# Patient Record
Sex: MALE | Race: WHITE | Employment: OTHER | URBAN - METROPOLITAN AREA
[De-identification: names, ages, dates, MRNs, and addresses within clinical notes are randomized per-mention and may not be internally consistent; named-entity substitution may affect disease eponyms.]

---

## 2017-01-19 ENCOUNTER — OFFICE VISIT (OUTPATIENT)
Dept: CARDIOLOGY CLINIC | Age: 77
End: 2017-01-19

## 2017-01-19 VITALS
HEIGHT: 72 IN | HEART RATE: 72 BPM | BODY MASS INDEX: 32.51 KG/M2 | SYSTOLIC BLOOD PRESSURE: 120 MMHG | DIASTOLIC BLOOD PRESSURE: 80 MMHG | WEIGHT: 240 LBS

## 2017-01-19 DIAGNOSIS — Z98.62 S/P ANGIOPLASTY: ICD-10-CM

## 2017-01-19 DIAGNOSIS — I65.21 STENOSIS OF RIGHT CAROTID ARTERY: ICD-10-CM

## 2017-01-19 DIAGNOSIS — R93.89 ABNORMAL CAROTID ULTRASOUND: ICD-10-CM

## 2017-01-19 DIAGNOSIS — I25.810 CORONARY ARTERY DISEASE INVOLVING CORONARY BYPASS GRAFT OF NATIVE HEART WITHOUT ANGINA PECTORIS: Primary | ICD-10-CM

## 2017-01-19 DIAGNOSIS — I77.9 BILATERAL CAROTID ARTERY DISEASE (HCC): ICD-10-CM

## 2017-01-19 DIAGNOSIS — I10 ESSENTIAL HYPERTENSION: ICD-10-CM

## 2017-01-19 DIAGNOSIS — Z95.1 S/P CABG X 3: ICD-10-CM

## 2017-01-19 DIAGNOSIS — E78.2 MIXED HYPERLIPIDEMIA: ICD-10-CM

## 2017-01-19 DIAGNOSIS — I21.4 NON-ST ELEVATION (NSTEMI) MYOCARDIAL INFARCTION (HCC): ICD-10-CM

## 2017-01-19 PROCEDURE — 99214 OFFICE O/P EST MOD 30 MIN: CPT | Performed by: INTERNAL MEDICINE

## 2017-02-03 DIAGNOSIS — Z98.62 S/P ANGIOPLASTY: ICD-10-CM

## 2017-02-03 DIAGNOSIS — I25.810 CORONARY ARTERY DISEASE INVOLVING CORONARY BYPASS GRAFT OF NATIVE HEART WITHOUT ANGINA PECTORIS: ICD-10-CM

## 2017-03-14 ENCOUNTER — HOSPITAL ENCOUNTER (OUTPATIENT)
Dept: GENERAL RADIOLOGY | Age: 77
Discharge: HOME | End: 2017-03-14
Attending: UROLOGY | Admitting: UROLOGY

## 2017-03-14 ENCOUNTER — HOSPITAL ENCOUNTER (OUTPATIENT)
Dept: ULTRASOUND IMAGING | Age: 77
Discharge: HOME | End: 2017-03-14
Attending: GENERAL PRACTICE | Admitting: GENERAL PRACTICE

## 2017-03-14 DIAGNOSIS — R31.9 HEMATURIA SYNDROME: ICD-10-CM

## 2017-03-14 LAB — PROSTATE SPECIFIC ANTIGEN: 0.4 NG/ML (ref 0–4)

## 2017-04-06 ENCOUNTER — HOSPITAL ENCOUNTER (OUTPATIENT)
Dept: ULTRASOUND IMAGING | Age: 77
Discharge: HOME | End: 2017-04-06
Attending: UROLOGY | Admitting: UROLOGY

## 2017-04-06 DIAGNOSIS — N45.2 ORCHITIS: ICD-10-CM

## 2017-04-06 DIAGNOSIS — N47.1 PHIMOSIS: ICD-10-CM

## 2017-04-06 DIAGNOSIS — N45.2 ACUTE ORCHITIS: ICD-10-CM

## 2017-05-15 LAB
CHOLESTEROL, TOTAL: NORMAL MG/DL
CHOLESTEROL/HDL RATIO: NORMAL
HDLC SERPL-MCNC: 55 MG/DL (ref 35–70)
LDL CHOLESTEROL CALCULATED: 57 MG/DL (ref 0–160)
TRIGL SERPL-MCNC: 86 MG/DL
VLDLC SERPL CALC-MCNC: NORMAL MG/DL

## 2017-06-30 ENCOUNTER — OFFICE VISIT (OUTPATIENT)
Dept: CARDIOLOGY CLINIC | Age: 77
End: 2017-06-30

## 2017-06-30 VITALS
SYSTOLIC BLOOD PRESSURE: 138 MMHG | WEIGHT: 251 LBS | DIASTOLIC BLOOD PRESSURE: 68 MMHG | BODY MASS INDEX: 34 KG/M2 | HEART RATE: 72 BPM | HEIGHT: 72 IN

## 2017-06-30 DIAGNOSIS — R06.02 SOB (SHORTNESS OF BREATH): ICD-10-CM

## 2017-06-30 DIAGNOSIS — I10 ESSENTIAL HYPERTENSION: ICD-10-CM

## 2017-06-30 DIAGNOSIS — E78.2 MIXED HYPERLIPIDEMIA: ICD-10-CM

## 2017-06-30 DIAGNOSIS — I25.810 CORONARY ARTERY DISEASE INVOLVING CORONARY BYPASS GRAFT OF NATIVE HEART WITHOUT ANGINA PECTORIS: Primary | ICD-10-CM

## 2017-06-30 DIAGNOSIS — Z95.1 S/P CABG X 3: ICD-10-CM

## 2017-06-30 DIAGNOSIS — I65.21 STENOSIS OF RIGHT CAROTID ARTERY: ICD-10-CM

## 2017-06-30 DIAGNOSIS — I21.4 NON-ST ELEVATION (NSTEMI) MYOCARDIAL INFARCTION (HCC): ICD-10-CM

## 2017-06-30 DIAGNOSIS — Z98.62 S/P ANGIOPLASTY: ICD-10-CM

## 2017-06-30 DIAGNOSIS — I77.9 BILATERAL CAROTID ARTERY DISEASE (HCC): ICD-10-CM

## 2017-06-30 PROCEDURE — 99214 OFFICE O/P EST MOD 30 MIN: CPT | Performed by: INTERNAL MEDICINE

## 2018-01-09 ENCOUNTER — OFFICE VISIT (OUTPATIENT)
Dept: CARDIOLOGY CLINIC | Age: 78
End: 2018-01-09

## 2018-01-09 VITALS
DIASTOLIC BLOOD PRESSURE: 66 MMHG | HEART RATE: 84 BPM | SYSTOLIC BLOOD PRESSURE: 110 MMHG | WEIGHT: 243.4 LBS | BODY MASS INDEX: 32.97 KG/M2 | HEIGHT: 72 IN

## 2018-01-09 DIAGNOSIS — I20.0 UNSTABLE ANGINA (HCC): ICD-10-CM

## 2018-01-09 DIAGNOSIS — E78.2 MIXED HYPERLIPIDEMIA: ICD-10-CM

## 2018-01-09 DIAGNOSIS — I21.4 NON-ST ELEVATION (NSTEMI) MYOCARDIAL INFARCTION (HCC): ICD-10-CM

## 2018-01-09 DIAGNOSIS — Z98.62 S/P ANGIOPLASTY: ICD-10-CM

## 2018-01-09 DIAGNOSIS — I65.21 STENOSIS OF RIGHT CAROTID ARTERY: ICD-10-CM

## 2018-01-09 DIAGNOSIS — I25.810 CORONARY ARTERY DISEASE INVOLVING CORONARY BYPASS GRAFT OF NATIVE HEART WITHOUT ANGINA PECTORIS: Primary | ICD-10-CM

## 2018-01-09 DIAGNOSIS — I10 ESSENTIAL HYPERTENSION: ICD-10-CM

## 2018-01-09 DIAGNOSIS — Z95.1 S/P CABG X 3: ICD-10-CM

## 2018-01-09 DIAGNOSIS — I77.9 BILATERAL CAROTID ARTERY DISEASE (HCC): ICD-10-CM

## 2018-01-09 PROCEDURE — 1036F TOBACCO NON-USER: CPT | Performed by: INTERNAL MEDICINE

## 2018-01-09 PROCEDURE — G8417 CALC BMI ABV UP PARAM F/U: HCPCS | Performed by: INTERNAL MEDICINE

## 2018-01-09 PROCEDURE — 99214 OFFICE O/P EST MOD 30 MIN: CPT | Performed by: INTERNAL MEDICINE

## 2018-01-09 PROCEDURE — G8427 DOCREV CUR MEDS BY ELIG CLIN: HCPCS | Performed by: INTERNAL MEDICINE

## 2018-01-09 PROCEDURE — 1123F ACP DISCUSS/DSCN MKR DOCD: CPT | Performed by: INTERNAL MEDICINE

## 2018-01-09 PROCEDURE — G8598 ASA/ANTIPLAT THER USED: HCPCS | Performed by: INTERNAL MEDICINE

## 2018-01-09 PROCEDURE — 4040F PNEUMOC VAC/ADMIN/RCVD: CPT | Performed by: INTERNAL MEDICINE

## 2018-01-09 PROCEDURE — G8484 FLU IMMUNIZE NO ADMIN: HCPCS | Performed by: INTERNAL MEDICINE

## 2018-01-09 NOTE — LETTER
Cardiology [address] [place] [address] [address] [facility] [address]  Phone: [phone]  Fax: [phone]    Ricky Best MD        January 9, 2018     Edward Davis MD  [address] [place] [address]    Patient: Kota Crespo [facility]  MR Number: [id number]  YOB: 1940  Date of Visit: 1/9/2018    Dear Dr. Eligio Potter: Thank you for the request for consultation for Mary Reed to me for the evaluation of CAD. Below are the relevant portions of my assessment and plan of care. If you have questions, please do not hesitate to call me. I look forward to following Ruth Ann Jamison along with you.     Sincerely,        Ricky Best MD

## 2018-01-09 NOTE — PROGRESS NOTES
Brian Camacho is a 69 y.o. male who has    CHIEF COMPLAINT AS FOLLOWS:  CHEST PAIN: Patient denies any C/O chest pains at this time. SOB: No C/O SOB at this time. LEG EDEMA: No leg edema   PALPITATIONS: Denies any C/O Palpitations                               DIZZINESS: No C/O Dizziness                           SYNCOPE: None   OTHER:                                     HPI: Patient is here for F/U on his CAD, HTN & Dyslipidemia problems. He does not have any complaints at this time.     Bibiana Cadet has the following history recorded in care path:  Patient Active Problem List    Diagnosis Date Noted    Stenosis of right carotid artery 12/01/2016    Bilateral carotid artery disease ([facility] 10/20/2016    Angina at rest [facility])     Abnormal carotid ultrasound     [facility] 09/30/2015    Abnormal nuclear stress test 03/30/2015    Chest pain 03/30/2015    CAD (coronary artery disease)     SOB (shortness of breath)     Hyperlipemia     S/P CABG x 3     HTN (hypertension)     S/P angioplasty     MI (myocardial infarction)     H/O cardiovascular stress test 08/21/2013     Current Outpatient Prescriptions   Medication Sig Dispense Refill    ticagrelor (BRILINTA) 90 MG TABS tablet Take 1 tablet by mouth 2 times daily 90 tablet 3    PRALUENT 75 MG/ML SOPN injection pen Inject 1 mL into the skin every 14 days 6 Pen 2    nitroGLYCERIN (NITROSTAT) 0.4 MG SL tablet Place 1 tablet under the tongue every 5 minutes as needed for Chest pain 25 tablet 2    atorvastatin (LIPITOR) 40 MG tablet Take 1 tablet by mouth daily (Patient taking differently: Take 40 mg by mouth every morning ) 90 tablet 0    naproxen (NAPROSYN) 500 MG tablet Take 500 mg by mouth 2 times daily (with meals)      gabapentin (NEURONTIN) 100 MG capsule Take 100 mg by mouth 3 times daily      traMADol (ULTRAM) 50 MG tablet Take 50 mg of breath  Cardiovascular: Negative for chest pain, dyspnea on exertion, claudication, edema, irregular heartbeat, murmur, palpitations or shortness of breath  Gastrointestinal: Negative for abdominal pain or heartburn  Genito-Urinary: Negative for urinary frequency/urgency  Musculoskeletal: Negative for muscle pain, muscular weakness, negative for pain in arm and leg or swelling in foot and leg  Neurological: Negative for dizziness, headaches, memory loss, numbness/tingling, visual changes, syncope  Dermatological: Negative for rash    Objective: There were no vitals taken for this visit. Wt Readings from Last 3 Encounters:   06/30/17 251 lb (113.9 kg)   01/31/17 240 lb (108.9 kg)   01/19/17 240 lb (108.9 kg)     There is no height or weight on file to calculate BMI. GENERAL - Alert, oriented, pleasant, in no apparent distress. EYES: No jaundice, no conjunctival pallor. SKIN: It is warm & dry. No rashes. No Echhymosis    HEENT  No clinically significant abnormalities seen. Neck - Supple. No jugular venous distention noted. No carotid bruits. Cardiovascular  Normal S1 and S2 without obvious murmur or gallop. Extremities - No cyanosis, clubbing, or significant edema. Pulmonary  No respiratory distress. No wheezes or rales. Abdomen  No masses, tenderness, or organomegaly. Musculoskeletal  No significant edema. No joint deformities. No muscle wasting. Neurologic  Cranial nerves II through XII are grossly intact. There were no gross focal neurologic abnormalities.     Lab Review   No results found for: CKTOTAL, CKMB, CKMBINDEX, TROPONINI  BNP:  No results found for: BNP  PT/INR:    Lab Results   Component Value Date    INR 0.96 12/12/2016     No results found for: LABA1C  Lab Results   Component Value Date    WBC 12.4 (H) 12/15/2016    HCT 44.3 12/15/2016    MCV 89.1 12/15/2016     12/15/2016     Lab Results   Component Value Date    CHOL 120 02/12/2016    TRIG 86 05/15/2017    HDL 55 05/15/2017    LDLCALC 57 05/15/2017    LDLDIRECT 67 02/12/2016     No results found for: ALT, AST  BMP:    Lab Results   Component Value Date     12/15/2016    K 4.4 12/15/2016     12/15/2016    CO2 26 12/15/2016    BUN 15 12/15/2016    CREATININE 0.9 12/15/2016     CMP:   Lab Results   Component Value Date     12/15/2016    K 4.4 12/15/2016     12/15/2016    CO2 26 12/15/2016    BUN 15 12/15/2016     TSH:  No results found for: TSH    QUALITY MEASURES REVIEWED:  1.CAD:Patient is taking anti platelet agent:Yes  2. DYSLIPIDEMIA: Patient is on cholesterol lowering medication:Yes  3. Beta-Blocker therapy for CAD, if prior Myocardial Infarction:Yes  4. Atrial fibrillation & anticoagulation therapy No  5. Discussed weight management strategies. Impression:    1. Coronary artery disease involving coronary bypass graft of native heart without angina pectoris    2. Unstable angina (HCC)    3. Stenosis of right carotid artery    4. S/P CABG x 3    5. S/P angioplasty    6. Non-ST elevation (NSTEMI) myocardial infarction ([facility]    7. Mixed hyperlipidemia    8. Essential hypertension    9. Bilateral carotid artery disease ([facility]       Patient Active Problem List   Diagnosis Code    H/O cardiovascular stress test Z92.89    CAD (coronary artery disease) I25.10    SOB (shortness of breath) R06.02    Hyperlipemia E78.5    S/P CABG x 3 Z95.1    HTN (hypertension) I10    S/P angioplasty Z98.62    MI (myocardial infarction) I21.9    Abnormal nuclear stress test R94.39    Chest pain R07.9    Unstable angina (HCC) I20.0    Angina at rest ([address] I20.8    Abnormal carotid ultrasound R93.8    Bilateral carotid artery disease (HCC) I77.9    Stenosis of right carotid artery I65.21       Assessment & Plan:    CAD:Yes   clinically stable. Patient is on optimal medical regimen ( see medication list above )  -     CORONARY ARTERY DISEASE: Patient is currently  asymptomatic from CAD.             - changes in

## 2018-03-01 RX ORDER — TICAGRELOR 90 MG/1
TABLET ORAL
Qty: 90 TABLET | Refills: 3 | Status: ON HOLD | OUTPATIENT
Start: 2018-03-01 | End: 2021-04-10 | Stop reason: HOSPADM

## 2018-04-05 ENCOUNTER — TELEPHONE (OUTPATIENT)
Dept: CARDIOLOGY CLINIC | Age: 78
End: 2018-04-05

## 2018-04-10 ENCOUNTER — TELEPHONE (OUTPATIENT)
Dept: CARDIOLOGY CLINIC | Age: 78
End: 2018-04-10

## 2018-04-30 ENCOUNTER — TELEPHONE (OUTPATIENT)
Dept: CARDIOLOGY CLINIC | Age: 78
End: 2018-04-30

## 2018-05-23 DIAGNOSIS — Z01.818 PRE-OP EXAMINATION: Primary | ICD-10-CM

## 2018-05-23 DIAGNOSIS — I10 ESSENTIAL HYPERTENSION: ICD-10-CM

## 2018-05-23 DIAGNOSIS — I25.10 CORONARY ARTERY DISEASE INVOLVING NATIVE CORONARY ARTERY OF NATIVE HEART WITHOUT ANGINA PECTORIS: ICD-10-CM

## 2018-05-23 DIAGNOSIS — Z95.1 S/P CABG X 3: ICD-10-CM

## 2018-05-29 ENCOUNTER — PROCEDURE VISIT (OUTPATIENT)
Dept: CARDIOLOGY CLINIC | Age: 78
End: 2018-05-29

## 2018-05-29 DIAGNOSIS — Z95.1 S/P CABG X 3: ICD-10-CM

## 2018-05-29 DIAGNOSIS — Z01.818 PRE-OP EXAMINATION: ICD-10-CM

## 2018-05-29 DIAGNOSIS — I10 ESSENTIAL HYPERTENSION: ICD-10-CM

## 2018-05-29 DIAGNOSIS — I25.10 CORONARY ARTERY DISEASE INVOLVING NATIVE CORONARY ARTERY OF NATIVE HEART WITHOUT ANGINA PECTORIS: ICD-10-CM

## 2018-05-29 LAB
LV EF: 42 %
LVEF MODALITY: NORMAL

## 2018-05-29 PROCEDURE — 93015 CV STRESS TEST SUPVJ I&R: CPT | Performed by: INTERNAL MEDICINE

## 2018-05-29 PROCEDURE — 78452 HT MUSCLE IMAGE SPECT MULT: CPT | Performed by: INTERNAL MEDICINE

## 2018-05-29 PROCEDURE — A9500 TC99M SESTAMIBI: HCPCS | Performed by: INTERNAL MEDICINE

## 2018-05-30 ENCOUNTER — TELEPHONE (OUTPATIENT)
Dept: CARDIOLOGY CLINIC | Age: 78
End: 2018-05-30

## 2018-06-01 ENCOUNTER — TELEPHONE (OUTPATIENT)
Dept: CARDIOLOGY CLINIC | Age: 78
End: 2018-06-01

## 2018-06-01 ENCOUNTER — HOSPITAL ENCOUNTER (OUTPATIENT)
Dept: GENERAL RADIOLOGY | Age: 78
Discharge: HOME | End: 2018-06-01
Attending: INTERNAL MEDICINE | Admitting: INTERNAL MEDICINE

## 2018-06-01 DIAGNOSIS — Z01.818 PRE-PROCEDURAL EXAMINATION: ICD-10-CM

## 2018-06-01 LAB
ANION GAP SERPL CALCULATED.3IONS-SCNC: 13 MMOL/L (ref 4–16)
APTT: 29.5 SECONDS (ref 21.2–33)
BASOPHILS ABSOLUTE: 0 K/CU MM
BASOPHILS RELATIVE PERCENT: 0.6 % (ref 0–1)
BUN BLDV-MCNC: 27 MG/DL (ref 6–23)
CALCIUM SERPL-MCNC: 9.5 MG/DL (ref 8.3–10.6)
CHLORIDE BLD-SCNC: 104 MMOL/L (ref 99–110)
CO2: 23 MMOL/L (ref 21–32)
CREAT SERPL-MCNC: 1.1 MG/DL (ref 0.9–1.3)
DIFFERENTIAL TYPE: ABNORMAL
EOSINOPHILS ABSOLUTE: 0.1 K/CU MM
EOSINOPHILS RELATIVE PERCENT: 1.8 % (ref 0–3)
GFR AFRICAN AMERICAN: >60 ML/MIN/1.73M2
GFR NON-AFRICAN AMERICAN: >60 ML/MIN/1.73M2
GLUCOSE BLD-MCNC: 114 MG/DL (ref 70–99)
HCT VFR BLD CALC: 43 % (ref 42–52)
HEMOGLOBIN: 14.5 GM/DL (ref 13.5–18)
IMMATURE NEUTROPHIL %: 0.3 % (ref 0–0.43)
INR BLD: 0.98 INDEX
LYMPHOCYTES ABSOLUTE: 1.1 K/CU MM
LYMPHOCYTES RELATIVE PERCENT: 17 % (ref 24–44)
MCH RBC QN AUTO: 31.5 PG (ref 27–31)
MCHC RBC AUTO-ENTMCNC: 33.7 % (ref 32–36)
MCV RBC AUTO: 93.3 FL (ref 78–100)
MONOCYTES ABSOLUTE: 0.9 K/CU MM
MONOCYTES RELATIVE PERCENT: 13.9 % (ref 0–4)
NUCLEATED RBC %: 0 %
PDW BLD-RTO: 12.3 % (ref 11.7–14.9)
PLATELET # BLD: 151 K/CU MM (ref 140–440)
PMV BLD AUTO: 10.5 FL (ref 7.5–11.1)
POTASSIUM SERPL-SCNC: 4 MMOL/L (ref 3.5–5.1)
PROTHROMBIN TIME: 11.2 SECONDS (ref 9.12–12.5)
RBC # BLD: 4.61 M/CU MM (ref 4.6–6.2)
SEGMENTED NEUTROPHILS ABSOLUTE COUNT: 4.2 K/CU MM
SEGMENTED NEUTROPHILS RELATIVE PERCENT: 66.4 % (ref 36–66)
SODIUM BLD-SCNC: 140 MMOL/L (ref 135–145)
TOTAL IMMATURE NEUTOROPHIL: 0.02 K/CU MM
TOTAL NUCLEATED RBC: 0 K/CU MM
WBC # BLD: 6.3 K/CU MM (ref 4–10.5)

## 2018-06-02 ENCOUNTER — SURG/PROC ORDERS (OUTPATIENT)
Dept: CARDIOLOGY CLINIC | Age: 78
End: 2018-06-02

## 2018-06-02 RX ORDER — SODIUM CHLORIDE 0.9 % (FLUSH) 0.9 %
10 SYRINGE (ML) INJECTION EVERY 12 HOURS SCHEDULED
Status: CANCELLED | OUTPATIENT
Start: 2018-06-02

## 2018-06-02 RX ORDER — SODIUM CHLORIDE 0.9 % (FLUSH) 0.9 %
10 SYRINGE (ML) INJECTION PRN
Status: CANCELLED | OUTPATIENT
Start: 2018-06-02

## 2018-06-02 RX ORDER — DIAZEPAM 5 MG/1
5 TABLET ORAL
Status: CANCELLED | OUTPATIENT
Start: 2018-06-02 | End: 2018-06-02

## 2018-06-02 RX ORDER — SODIUM CHLORIDE 9 MG/ML
INJECTION, SOLUTION INTRAVENOUS CONTINUOUS
Status: CANCELLED | OUTPATIENT
Start: 2018-06-02

## 2018-06-02 RX ORDER — DIPHENHYDRAMINE HCL 25 MG
25 TABLET ORAL
Status: CANCELLED | OUTPATIENT
Start: 2018-06-02 | End: 2018-06-02

## 2018-06-04 LAB
EKG ATRIAL RATE: 63 BPM
EKG DIAGNOSIS: NORMAL
EKG P AXIS: 44 DEGREES
EKG P-R INTERVAL: 252 MS
EKG Q-T INTERVAL: 412 MS
EKG QRS DURATION: 100 MS
EKG QTC CALCULATION (BAZETT): 421 MS
EKG R AXIS: 1 DEGREES
EKG T AXIS: -22 DEGREES
EKG VENTRICULAR RATE: 63 BPM

## 2018-06-07 ENCOUNTER — TELEPHONE (OUTPATIENT)
Dept: CARDIOLOGY CLINIC | Age: 78
End: 2018-06-07

## 2019-02-22 ENCOUNTER — TELEPHONE (OUTPATIENT)
Dept: CARDIOLOGY CLINIC | Age: 79
End: 2019-02-22

## 2019-02-22 ENCOUNTER — OFFICE VISIT (OUTPATIENT)
Dept: CARDIOLOGY CLINIC | Age: 79
End: 2019-02-22
Payer: MEDICARE

## 2019-02-22 VITALS
WEIGHT: 252 LBS | HEIGHT: 72 IN | BODY MASS INDEX: 34.13 KG/M2 | HEART RATE: 63 BPM | DIASTOLIC BLOOD PRESSURE: 64 MMHG | SYSTOLIC BLOOD PRESSURE: 110 MMHG

## 2019-02-22 DIAGNOSIS — I10 ESSENTIAL HYPERTENSION: ICD-10-CM

## 2019-02-22 DIAGNOSIS — Z01.818 PRE-OP EVALUATION: ICD-10-CM

## 2019-02-22 DIAGNOSIS — I77.9 BILATERAL CAROTID ARTERY DISEASE, UNSPECIFIED TYPE (HCC): ICD-10-CM

## 2019-02-22 DIAGNOSIS — E78.2 MIXED HYPERLIPIDEMIA: ICD-10-CM

## 2019-02-22 DIAGNOSIS — I25.10 CORONARY ARTERY DISEASE INVOLVING NATIVE CORONARY ARTERY OF NATIVE HEART WITHOUT ANGINA PECTORIS: Primary | ICD-10-CM

## 2019-02-22 PROCEDURE — 99213 OFFICE O/P EST LOW 20 MIN: CPT | Performed by: NURSE PRACTITIONER

## 2019-02-22 PROCEDURE — 93000 ELECTROCARDIOGRAM COMPLETE: CPT | Performed by: NURSE PRACTITIONER

## 2019-10-01 ENCOUNTER — TELEPHONE (OUTPATIENT)
Dept: CARDIOLOGY CLINIC | Age: 79
End: 2019-10-01

## 2019-10-02 ENCOUNTER — TELEPHONE (OUTPATIENT)
Dept: CARDIOLOGY CLINIC | Age: 79
End: 2019-10-02

## 2019-10-16 ENCOUNTER — OFFICE VISIT (OUTPATIENT)
Dept: PHYSICAL MEDICINE AND REHAB | Age: 79
End: 2019-10-16
Payer: MEDICARE

## 2019-10-16 DIAGNOSIS — E11.42 DIABETIC SENSORIMOTOR POLYNEUROPATHY (HCC): ICD-10-CM

## 2019-10-16 DIAGNOSIS — R20.2 PARESTHESIA OF BOTH FEET: ICD-10-CM

## 2019-10-16 DIAGNOSIS — M54.32 SCIATICA OF LEFT SIDE: Primary | ICD-10-CM

## 2019-10-16 DIAGNOSIS — R29.898 LEFT LEG WEAKNESS: ICD-10-CM

## 2019-10-16 PROCEDURE — 95911 NRV CNDJ TEST 9-10 STUDIES: CPT | Performed by: PHYSICAL MEDICINE & REHABILITATION

## 2019-10-16 PROCEDURE — 95886 MUSC TEST DONE W/N TEST COMP: CPT | Performed by: PHYSICAL MEDICINE & REHABILITATION

## 2019-10-17 ENCOUNTER — OFFICE VISIT (OUTPATIENT)
Dept: CARDIOLOGY CLINIC | Age: 79
End: 2019-10-17
Payer: MEDICARE

## 2019-10-17 ENCOUNTER — TELEPHONE (OUTPATIENT)
Dept: CARDIOLOGY CLINIC | Age: 79
End: 2019-10-17

## 2019-10-17 VITALS
HEART RATE: 68 BPM | BODY MASS INDEX: 32.78 KG/M2 | DIASTOLIC BLOOD PRESSURE: 78 MMHG | WEIGHT: 242 LBS | HEIGHT: 72 IN | RESPIRATION RATE: 14 BRPM | SYSTOLIC BLOOD PRESSURE: 124 MMHG

## 2019-10-17 DIAGNOSIS — R07.9 CHEST PAIN, UNSPECIFIED TYPE: Primary | ICD-10-CM

## 2019-10-17 PROCEDURE — G8484 FLU IMMUNIZE NO ADMIN: HCPCS | Performed by: INTERNAL MEDICINE

## 2019-10-17 PROCEDURE — G8598 ASA/ANTIPLAT THER USED: HCPCS | Performed by: INTERNAL MEDICINE

## 2019-10-17 PROCEDURE — G8417 CALC BMI ABV UP PARAM F/U: HCPCS | Performed by: INTERNAL MEDICINE

## 2019-10-17 PROCEDURE — 1036F TOBACCO NON-USER: CPT | Performed by: INTERNAL MEDICINE

## 2019-10-17 PROCEDURE — 1123F ACP DISCUSS/DSCN MKR DOCD: CPT | Performed by: INTERNAL MEDICINE

## 2019-10-17 PROCEDURE — 4040F PNEUMOC VAC/ADMIN/RCVD: CPT | Performed by: INTERNAL MEDICINE

## 2019-10-17 PROCEDURE — 99214 OFFICE O/P EST MOD 30 MIN: CPT | Performed by: INTERNAL MEDICINE

## 2019-10-17 PROCEDURE — 93000 ELECTROCARDIOGRAM COMPLETE: CPT | Performed by: INTERNAL MEDICINE

## 2019-10-17 PROCEDURE — G8427 DOCREV CUR MEDS BY ELIG CLIN: HCPCS | Performed by: INTERNAL MEDICINE

## 2019-10-18 ENCOUNTER — TELEPHONE (OUTPATIENT)
Dept: CARDIOLOGY CLINIC | Age: 79
End: 2019-10-18

## 2019-10-23 ENCOUNTER — PROCEDURE VISIT (OUTPATIENT)
Dept: CARDIOLOGY CLINIC | Age: 79
End: 2019-10-23
Payer: MEDICARE

## 2019-10-23 DIAGNOSIS — R07.9 CHEST PAIN, UNSPECIFIED TYPE: Primary | ICD-10-CM

## 2019-10-23 DIAGNOSIS — R07.9 CHEST PAIN, UNSPECIFIED TYPE: ICD-10-CM

## 2019-10-23 LAB
LV EF: 48 %
LV EF: 51 %
LVEF MODALITY: NORMAL
LVEF MODALITY: NORMAL

## 2019-10-23 PROCEDURE — 93018 CV STRESS TEST I&R ONLY: CPT | Performed by: INTERNAL MEDICINE

## 2019-10-23 PROCEDURE — 93017 CV STRESS TEST TRACING ONLY: CPT | Performed by: INTERNAL MEDICINE

## 2019-10-23 PROCEDURE — 93016 CV STRESS TEST SUPVJ ONLY: CPT | Performed by: INTERNAL MEDICINE

## 2019-10-23 PROCEDURE — 78452 HT MUSCLE IMAGE SPECT MULT: CPT | Performed by: INTERNAL MEDICINE

## 2019-10-23 PROCEDURE — 93306 TTE W/DOPPLER COMPLETE: CPT | Performed by: INTERNAL MEDICINE

## 2019-10-23 PROCEDURE — A9500 TC99M SESTAMIBI: HCPCS | Performed by: INTERNAL MEDICINE

## 2019-10-28 ENCOUNTER — TELEPHONE (OUTPATIENT)
Dept: CARDIOLOGY CLINIC | Age: 79
End: 2019-10-28

## 2019-11-14 ENCOUNTER — OFFICE VISIT (OUTPATIENT)
Dept: CARDIOLOGY CLINIC | Age: 79
End: 2019-11-14
Payer: MEDICARE

## 2019-11-14 VITALS
HEART RATE: 74 BPM | SYSTOLIC BLOOD PRESSURE: 132 MMHG | DIASTOLIC BLOOD PRESSURE: 74 MMHG | BODY MASS INDEX: 33 KG/M2 | WEIGHT: 243.6 LBS | HEIGHT: 72 IN

## 2019-11-14 DIAGNOSIS — Z95.1 S/P CABG X 3: ICD-10-CM

## 2019-11-14 DIAGNOSIS — I65.21 STENOSIS OF RIGHT CAROTID ARTERY: ICD-10-CM

## 2019-11-14 DIAGNOSIS — Z98.62 S/P ANGIOPLASTY: ICD-10-CM

## 2019-11-14 DIAGNOSIS — I10 ESSENTIAL HYPERTENSION: ICD-10-CM

## 2019-11-14 DIAGNOSIS — E78.2 MIXED HYPERLIPIDEMIA: ICD-10-CM

## 2019-11-14 DIAGNOSIS — I21.4 NON-ST ELEVATION MYOCARDIAL INFARCTION (NSTEMI) (HCC): ICD-10-CM

## 2019-11-14 DIAGNOSIS — I25.10 CORONARY ARTERY DISEASE INVOLVING NATIVE CORONARY ARTERY OF NATIVE HEART WITHOUT ANGINA PECTORIS: Primary | ICD-10-CM

## 2019-11-14 DIAGNOSIS — I77.9 BILATERAL CAROTID ARTERY DISEASE, UNSPECIFIED TYPE (HCC): ICD-10-CM

## 2019-11-14 DIAGNOSIS — R94.39 ABNORMAL NUCLEAR STRESS TEST: ICD-10-CM

## 2019-11-14 PROCEDURE — G8598 ASA/ANTIPLAT THER USED: HCPCS | Performed by: INTERNAL MEDICINE

## 2019-11-14 PROCEDURE — 99214 OFFICE O/P EST MOD 30 MIN: CPT | Performed by: INTERNAL MEDICINE

## 2019-11-14 PROCEDURE — 1036F TOBACCO NON-USER: CPT | Performed by: INTERNAL MEDICINE

## 2019-11-14 PROCEDURE — G8417 CALC BMI ABV UP PARAM F/U: HCPCS | Performed by: INTERNAL MEDICINE

## 2019-11-14 PROCEDURE — G8484 FLU IMMUNIZE NO ADMIN: HCPCS | Performed by: INTERNAL MEDICINE

## 2019-11-14 PROCEDURE — G8427 DOCREV CUR MEDS BY ELIG CLIN: HCPCS | Performed by: INTERNAL MEDICINE

## 2019-11-14 PROCEDURE — 1123F ACP DISCUSS/DSCN MKR DOCD: CPT | Performed by: INTERNAL MEDICINE

## 2019-11-14 PROCEDURE — 4040F PNEUMOC VAC/ADMIN/RCVD: CPT | Performed by: INTERNAL MEDICINE

## 2020-01-21 RX ORDER — SODIUM CHLORIDE, SODIUM LACTATE, POTASSIUM CHLORIDE, CALCIUM CHLORIDE 600; 310; 30; 20 MG/100ML; MG/100ML; MG/100ML; MG/100ML
INJECTION, SOLUTION INTRAVENOUS CONTINUOUS
Status: CANCELLED | OUTPATIENT
Start: 2020-01-31

## 2020-01-22 ENCOUNTER — ANESTHESIA EVENT (OUTPATIENT)
Dept: OPERATING ROOM | Age: 80
DRG: 483 | End: 2020-01-22
Payer: MEDICARE

## 2020-01-22 ASSESSMENT — ENCOUNTER SYMPTOMS: SHORTNESS OF BREATH: 1

## 2020-01-23 NOTE — PROGRESS NOTES
1/23/20 - Reminded of pre-testing on 1/24/20 @ 1000, arrival 0900. Bring insurance card, picture ID and list of medications. Kaitlinjose Mckeono his wife verbalized understanding.

## 2020-01-24 ENCOUNTER — HOSPITAL ENCOUNTER (OUTPATIENT)
Dept: GENERAL RADIOLOGY | Age: 80
Discharge: HOME | End: 2020-01-24
Payer: MEDICARE

## 2020-01-24 ENCOUNTER — HOSPITAL ENCOUNTER (OUTPATIENT)
Dept: PREADMISSION TESTING | Age: 80
Discharge: HOME | End: 2020-01-28
Payer: MEDICARE

## 2020-01-24 VITALS
RESPIRATION RATE: 18 BRPM | BODY MASS INDEX: 32.64 KG/M2 | TEMPERATURE: 97.4 F | WEIGHT: 241 LBS | SYSTOLIC BLOOD PRESSURE: 134 MMHG | DIASTOLIC BLOOD PRESSURE: 66 MMHG | OXYGEN SATURATION: 97 % | HEIGHT: 72 IN | HEART RATE: 73 BPM

## 2020-01-24 LAB
ABO/RH: NORMAL
ALBUMIN SERPL-MCNC: 4.1 GM/DL (ref 3.4–5)
ALP BLD-CCNC: 122 IU/L (ref 40–128)
ALT SERPL-CCNC: 11 U/L (ref 10–40)
ANION GAP SERPL CALCULATED.3IONS-SCNC: 10 MMOL/L (ref 4–16)
ANTIBODY SCREEN: NEGATIVE
AST SERPL-CCNC: 20 IU/L (ref 15–37)
BASOPHILS ABSOLUTE: 0 K/CU MM
BASOPHILS RELATIVE PERCENT: 0.4 % (ref 0–1)
BILIRUB SERPL-MCNC: 0.5 MG/DL (ref 0–1)
BUN BLDV-MCNC: 30 MG/DL (ref 6–23)
CALCIUM SERPL-MCNC: 9.2 MG/DL (ref 8.3–10.6)
CHLORIDE BLD-SCNC: 103 MMOL/L (ref 99–110)
CO2: 27 MMOL/L (ref 21–32)
COMMENT: NORMAL
CREAT SERPL-MCNC: 1.2 MG/DL (ref 0.9–1.3)
DIFFERENTIAL TYPE: ABNORMAL
EKG ATRIAL RATE: 62 BPM
EKG DIAGNOSIS: NORMAL
EKG P AXIS: 47 DEGREES
EKG P-R INTERVAL: 278 MS
EKG Q-T INTERVAL: 416 MS
EKG QRS DURATION: 96 MS
EKG QTC CALCULATION (BAZETT): 422 MS
EKG R AXIS: -16 DEGREES
EKG T AXIS: -31 DEGREES
EKG VENTRICULAR RATE: 62 BPM
EOSINOPHILS ABSOLUTE: 0.1 K/CU MM
EOSINOPHILS RELATIVE PERCENT: 1.4 % (ref 0–3)
ERYTHROCYTE SEDIMENTATION RATE: 6 MM/HR (ref 0–20)
GFR AFRICAN AMERICAN: >60 ML/MIN/1.73M2
GFR NON-AFRICAN AMERICAN: 58 ML/MIN/1.73M2
GLUCOSE BLD-MCNC: 176 MG/DL (ref 70–99)
HCT VFR BLD CALC: 49.1 % (ref 42–52)
HEMOGLOBIN: 15.9 GM/DL (ref 13.5–18)
IMMATURE NEUTROPHIL %: 0.6 % (ref 0–0.43)
LYMPHOCYTES ABSOLUTE: 1.3 K/CU MM
LYMPHOCYTES RELATIVE PERCENT: 16.2 % (ref 24–44)
MCH RBC QN AUTO: 29.1 PG (ref 27–31)
MCHC RBC AUTO-ENTMCNC: 32.4 % (ref 32–36)
MCV RBC AUTO: 89.8 FL (ref 78–100)
MONOCYTES ABSOLUTE: 0.7 K/CU MM
MONOCYTES RELATIVE PERCENT: 8.5 % (ref 0–4)
NUCLEATED RBC %: 0 %
PDW BLD-RTO: 12.4 % (ref 11.7–14.9)
PLATELET # BLD: 202 K/CU MM (ref 140–440)
PMV BLD AUTO: 10.1 FL (ref 7.5–11.1)
POTASSIUM SERPL-SCNC: 4.2 MMOL/L (ref 3.5–5.1)
RBC # BLD: 5.47 M/CU MM (ref 4.6–6.2)
SEGMENTED NEUTROPHILS ABSOLUTE COUNT: 5.6 K/CU MM
SEGMENTED NEUTROPHILS RELATIVE PERCENT: 72.9 % (ref 36–66)
SODIUM BLD-SCNC: 140 MMOL/L (ref 135–145)
TOTAL IMMATURE NEUTOROPHIL: 0.05 K/CU MM
TOTAL NUCLEATED RBC: 0 K/CU MM
TOTAL PROTEIN: 6.4 GM/DL (ref 6.4–8.2)
WBC # BLD: 7.7 K/CU MM (ref 4–10.5)

## 2020-01-24 PROCEDURE — 71046 X-RAY EXAM CHEST 2 VIEWS: CPT

## 2020-01-24 PROCEDURE — 86850 RBC ANTIBODY SCREEN: CPT

## 2020-01-24 PROCEDURE — 36415 COLL VENOUS BLD VENIPUNCTURE: CPT

## 2020-01-24 PROCEDURE — 87086 URINE CULTURE/COLONY COUNT: CPT

## 2020-01-24 PROCEDURE — 93010 ELECTROCARDIOGRAM REPORT: CPT | Performed by: INTERNAL MEDICINE

## 2020-01-24 PROCEDURE — 93005 ELECTROCARDIOGRAM TRACING: CPT | Performed by: ORTHOPAEDIC SURGERY

## 2020-01-24 PROCEDURE — 85025 COMPLETE CBC W/AUTO DIFF WBC: CPT

## 2020-01-24 PROCEDURE — 73030 X-RAY EXAM OF SHOULDER: CPT

## 2020-01-24 PROCEDURE — 85652 RBC SED RATE AUTOMATED: CPT

## 2020-01-24 PROCEDURE — 80053 COMPREHEN METABOLIC PANEL: CPT

## 2020-01-24 PROCEDURE — 86901 BLOOD TYPING SEROLOGIC RH(D): CPT

## 2020-01-24 PROCEDURE — 86900 BLOOD TYPING SEROLOGIC ABO: CPT

## 2020-01-24 RX ORDER — CEFAZOLIN SODIUM 2 G/100ML
2 INJECTION, SOLUTION INTRAVENOUS ONCE
Status: CANCELLED | OUTPATIENT
Start: 2020-01-31

## 2020-01-25 LAB
CULTURE: NORMAL
Lab: NORMAL
SPECIMEN: NORMAL

## 2020-01-31 ENCOUNTER — APPOINTMENT (OUTPATIENT)
Dept: GENERAL RADIOLOGY | Age: 80
DRG: 483 | End: 2020-01-31
Attending: ORTHOPAEDIC SURGERY
Payer: MEDICARE

## 2020-01-31 ENCOUNTER — HOSPITAL ENCOUNTER (INPATIENT)
Age: 80
LOS: 1 days | Discharge: HOME | DRG: 483 | End: 2020-02-01
Attending: ORTHOPAEDIC SURGERY | Admitting: ORTHOPAEDIC SURGERY
Payer: MEDICARE

## 2020-01-31 ENCOUNTER — ANESTHESIA (OUTPATIENT)
Dept: OPERATING ROOM | Age: 80
DRG: 483 | End: 2020-01-31
Payer: MEDICARE

## 2020-01-31 VITALS
TEMPERATURE: 96.4 F | RESPIRATION RATE: 7 BRPM | OXYGEN SATURATION: 100 % | SYSTOLIC BLOOD PRESSURE: 137 MMHG | DIASTOLIC BLOOD PRESSURE: 59 MMHG

## 2020-01-31 PROBLEM — Z96.612 HISTORY OF LEFT SHOULDER REPLACEMENT: Status: ACTIVE | Noted: 2020-01-31

## 2020-01-31 LAB
GLUCOSE BLD-MCNC: 136 MG/DL (ref 70–99)
GLUCOSE BLD-MCNC: 142 MG/DL (ref 70–99)

## 2020-01-31 PROCEDURE — 73030 X-RAY EXAM OF SHOULDER: CPT

## 2020-01-31 PROCEDURE — 6360000002 HC RX W HCPCS: Performed by: NURSE ANESTHETIST, CERTIFIED REGISTERED

## 2020-01-31 PROCEDURE — C1776 JOINT DEVICE (IMPLANTABLE): HCPCS | Performed by: ORTHOPAEDIC SURGERY

## 2020-01-31 PROCEDURE — L3650 SO 8 ABD RESTRAINT PRE OTS: HCPCS | Performed by: ORTHOPAEDIC SURGERY

## 2020-01-31 PROCEDURE — 3700000000 HC ANESTHESIA ATTENDED CARE: Performed by: ORTHOPAEDIC SURGERY

## 2020-01-31 PROCEDURE — 2500000003 HC RX 250 WO HCPCS: Performed by: NURSE ANESTHETIST, CERTIFIED REGISTERED

## 2020-01-31 PROCEDURE — 2500000003 HC RX 250 WO HCPCS: Performed by: ORTHOPAEDIC SURGERY

## 2020-01-31 PROCEDURE — 2580000003 HC RX 258: Performed by: ORTHOPAEDIC SURGERY

## 2020-01-31 PROCEDURE — 7100000000 HC PACU RECOVERY - FIRST 15 MIN: Performed by: ORTHOPAEDIC SURGERY

## 2020-01-31 PROCEDURE — 2709999900 HC NON-CHARGEABLE SUPPLY: Performed by: ORTHOPAEDIC SURGERY

## 2020-01-31 PROCEDURE — 2580000003 HC RX 258

## 2020-01-31 PROCEDURE — 6360000002 HC RX W HCPCS: Performed by: ANESTHESIOLOGY

## 2020-01-31 PROCEDURE — 6360000002 HC RX W HCPCS: Performed by: ORTHOPAEDIC SURGERY

## 2020-01-31 PROCEDURE — 3600000015 HC SURGERY LEVEL 5 ADDTL 15MIN: Performed by: ORTHOPAEDIC SURGERY

## 2020-01-31 PROCEDURE — 3600000005 HC SURGERY LEVEL 5 BASE: Performed by: ORTHOPAEDIC SURGERY

## 2020-01-31 PROCEDURE — 6370000000 HC RX 637 (ALT 250 FOR IP): Performed by: ORTHOPAEDIC SURGERY

## 2020-01-31 PROCEDURE — 64415 NJX AA&/STRD BRCH PLXS IMG: CPT | Performed by: ANESTHESIOLOGY

## 2020-01-31 PROCEDURE — 0RRK00Z REPLACEMENT OF LEFT SHOULDER JOINT WITH REVERSE BALL AND SOCKET SYNTHETIC SUBSTITUTE, OPEN APPROACH: ICD-10-PCS | Performed by: ORTHOPAEDIC SURGERY

## 2020-01-31 PROCEDURE — C1713 ANCHOR/SCREW BN/BN,TIS/BN: HCPCS | Performed by: ORTHOPAEDIC SURGERY

## 2020-01-31 PROCEDURE — 1200000000 HC SEMI PRIVATE

## 2020-01-31 PROCEDURE — 82962 GLUCOSE BLOOD TEST: CPT

## 2020-01-31 PROCEDURE — 7100000001 HC PACU RECOVERY - ADDTL 15 MIN: Performed by: ORTHOPAEDIC SURGERY

## 2020-01-31 PROCEDURE — 3700000001 HC ADD 15 MINUTES (ANESTHESIA): Performed by: ORTHOPAEDIC SURGERY

## 2020-01-31 DEVICE — IMPLANTABLE DEVICE: Type: IMPLANTABLE DEVICE | Site: SHOULDER | Status: FUNCTIONAL

## 2020-01-31 DEVICE — CUP HUM DIA38MM +3MM OFFSET STD SHLDR POLYETH DELT XTEND: Type: IMPLANTABLE DEVICE | Site: SHOULDER | Status: FUNCTIONAL

## 2020-01-31 DEVICE — SPHERE GLEN DIA38MM SHLDR ECC FOR DELT XTEND REV SYS: Type: IMPLANTABLE DEVICE | Site: SHOULDER | Status: FUNCTIONAL

## 2020-01-31 DEVICE — SCREW BNE L24MM DIA4.5MM GLEN SHLDR METAGLENE LOK FOR DELT: Type: IMPLANTABLE DEVICE | Site: SHOULDER | Status: FUNCTIONAL

## 2020-01-31 DEVICE — STEM HUM SZ 0 L144MM DIA12MM STD SHLDR CO CHROM HA: Type: IMPLANTABLE DEVICE | Site: SHOULDER | Status: FUNCTIONAL

## 2020-01-31 DEVICE — SCREW BNE L36MM DIA4.5MM GLEN SHLDR METAGLENE LOK FOR DELT: Type: IMPLANTABLE DEVICE | Site: SHOULDER | Status: FUNCTIONAL

## 2020-01-31 DEVICE — GUIDEPIN ORTH L150MM DIA1.2MM SCR FOR DELT XTEND REV SHLDR: Type: IMPLANTABLE DEVICE | Site: SHOULDER | Status: FUNCTIONAL

## 2020-01-31 RX ORDER — FENTANYL CITRATE 50 UG/ML
50 INJECTION, SOLUTION INTRAMUSCULAR; INTRAVENOUS EVERY 5 MIN PRN
Status: DISCONTINUED | OUTPATIENT
Start: 2020-01-31 | End: 2020-01-31 | Stop reason: HOSPADM

## 2020-01-31 RX ORDER — SODIUM CHLORIDE 0.9 % (FLUSH) 0.9 %
10 SYRINGE (ML) INJECTION EVERY 12 HOURS SCHEDULED
Status: DISCONTINUED | OUTPATIENT
Start: 2020-01-31 | End: 2020-02-01 | Stop reason: HOSPADM

## 2020-01-31 RX ORDER — CARVEDILOL 6.25 MG/1
6.25 TABLET ORAL 2 TIMES DAILY WITH MEALS
Status: DISCONTINUED | OUTPATIENT
Start: 2020-01-31 | End: 2020-02-01 | Stop reason: HOSPADM

## 2020-01-31 RX ORDER — DEXTROSE, SODIUM CHLORIDE, AND POTASSIUM CHLORIDE 5; .45; .15 G/100ML; G/100ML; G/100ML
1000 INJECTION INTRAVENOUS CONTINUOUS
Status: DISCONTINUED | OUTPATIENT
Start: 2020-01-31 | End: 2020-01-31

## 2020-01-31 RX ORDER — SODIUM CHLORIDE 0.9 % (FLUSH) 0.9 %
10 SYRINGE (ML) INJECTION PRN
Status: DISCONTINUED | OUTPATIENT
Start: 2020-01-31 | End: 2020-02-01 | Stop reason: HOSPADM

## 2020-01-31 RX ORDER — EPHEDRINE SULFATE 50 MG/ML
INJECTION INTRAVENOUS PRN
Status: DISCONTINUED | OUTPATIENT
Start: 2020-01-31 | End: 2020-01-31 | Stop reason: SDUPTHER

## 2020-01-31 RX ORDER — SODIUM CHLORIDE, SODIUM LACTATE, POTASSIUM CHLORIDE, CALCIUM CHLORIDE 600; 310; 30; 20 MG/100ML; MG/100ML; MG/100ML; MG/100ML
INJECTION, SOLUTION INTRAVENOUS
Status: COMPLETED
Start: 2020-01-31 | End: 2020-01-31

## 2020-01-31 RX ORDER — CEFAZOLIN SODIUM 2 G/100ML
2 INJECTION, SOLUTION INTRAVENOUS ONCE
Status: COMPLETED | OUTPATIENT
Start: 2020-01-31 | End: 2020-01-31

## 2020-01-31 RX ORDER — ONDANSETRON 2 MG/ML
4 INJECTION INTRAMUSCULAR; INTRAVENOUS EVERY 6 HOURS PRN
Status: DISCONTINUED | OUTPATIENT
Start: 2020-01-31 | End: 2020-02-01 | Stop reason: HOSPADM

## 2020-01-31 RX ORDER — ONDANSETRON 2 MG/ML
INJECTION INTRAMUSCULAR; INTRAVENOUS PRN
Status: DISCONTINUED | OUTPATIENT
Start: 2020-01-31 | End: 2020-01-31 | Stop reason: SDUPTHER

## 2020-01-31 RX ORDER — HYDROMORPHONE HCL 110MG/55ML
0.5 PATIENT CONTROLLED ANALGESIA SYRINGE INTRAVENOUS EVERY 5 MIN PRN
Status: DISCONTINUED | OUTPATIENT
Start: 2020-01-31 | End: 2020-01-31 | Stop reason: HOSPADM

## 2020-01-31 RX ORDER — CEFAZOLIN SODIUM 2 G/100ML
2 INJECTION, SOLUTION INTRAVENOUS EVERY 8 HOURS
Status: COMPLETED | OUTPATIENT
Start: 2020-01-31 | End: 2020-02-01

## 2020-01-31 RX ORDER — DEXAMETHASONE SODIUM PHOSPHATE 4 MG/ML
INJECTION, SOLUTION INTRA-ARTICULAR; INTRALESIONAL; INTRAMUSCULAR; INTRAVENOUS; SOFT TISSUE PRN
Status: DISCONTINUED | OUTPATIENT
Start: 2020-01-31 | End: 2020-01-31 | Stop reason: SDUPTHER

## 2020-01-31 RX ORDER — ACETAMINOPHEN 325 MG/1
650 TABLET ORAL EVERY 6 HOURS
Status: DISCONTINUED | OUTPATIENT
Start: 2020-01-31 | End: 2020-02-01 | Stop reason: HOSPADM

## 2020-01-31 RX ORDER — PROMETHAZINE HYDROCHLORIDE 25 MG/ML
6.25 INJECTION, SOLUTION INTRAMUSCULAR; INTRAVENOUS
Status: DISCONTINUED | OUTPATIENT
Start: 2020-01-31 | End: 2020-01-31 | Stop reason: HOSPADM

## 2020-01-31 RX ORDER — ATORVASTATIN CALCIUM 40 MG/1
40 TABLET, FILM COATED ORAL DAILY
Status: DISCONTINUED | OUTPATIENT
Start: 2020-02-01 | End: 2020-02-01 | Stop reason: HOSPADM

## 2020-01-31 RX ORDER — GABAPENTIN 100 MG/1
100 CAPSULE ORAL 3 TIMES DAILY
Status: DISCONTINUED | OUTPATIENT
Start: 2020-01-31 | End: 2020-02-01 | Stop reason: HOSPADM

## 2020-01-31 RX ORDER — FAMOTIDINE 20 MG/1
20 TABLET, FILM COATED ORAL 2 TIMES DAILY
Status: DISCONTINUED | OUTPATIENT
Start: 2020-01-31 | End: 2020-02-01 | Stop reason: HOSPADM

## 2020-01-31 RX ORDER — LIDOCAINE HYDROCHLORIDE 20 MG/ML
INJECTION, SOLUTION INTRAVENOUS PRN
Status: DISCONTINUED | OUTPATIENT
Start: 2020-01-31 | End: 2020-01-31 | Stop reason: SDUPTHER

## 2020-01-31 RX ORDER — PROPOFOL 10 MG/ML
INJECTION, EMULSION INTRAVENOUS PRN
Status: DISCONTINUED | OUTPATIENT
Start: 2020-01-31 | End: 2020-01-31 | Stop reason: SDUPTHER

## 2020-01-31 RX ORDER — HYDROMORPHONE HCL 110MG/55ML
0.25 PATIENT CONTROLLED ANALGESIA SYRINGE INTRAVENOUS EVERY 5 MIN PRN
Status: DISCONTINUED | OUTPATIENT
Start: 2020-01-31 | End: 2020-01-31 | Stop reason: HOSPADM

## 2020-01-31 RX ORDER — ACETAMINOPHEN 10 MG/ML
INJECTION, SOLUTION INTRAVENOUS PRN
Status: DISCONTINUED | OUTPATIENT
Start: 2020-01-31 | End: 2020-01-31 | Stop reason: SDUPTHER

## 2020-01-31 RX ORDER — BUPIVACAINE HYDROCHLORIDE AND EPINEPHRINE 5; 5 MG/ML; UG/ML
INJECTION, SOLUTION EPIDURAL; INTRACAUDAL; PERINEURAL
Status: COMPLETED | OUTPATIENT
Start: 2020-01-31 | End: 2020-01-31

## 2020-01-31 RX ORDER — ONDANSETRON 2 MG/ML
4 INJECTION INTRAMUSCULAR; INTRAVENOUS
Status: DISCONTINUED | OUTPATIENT
Start: 2020-01-31 | End: 2020-01-31 | Stop reason: HOSPADM

## 2020-01-31 RX ORDER — SODIUM CHLORIDE, SODIUM LACTATE, POTASSIUM CHLORIDE, CALCIUM CHLORIDE 600; 310; 30; 20 MG/100ML; MG/100ML; MG/100ML; MG/100ML
INJECTION, SOLUTION INTRAVENOUS CONTINUOUS
Status: DISCONTINUED | OUTPATIENT
Start: 2020-01-31 | End: 2020-01-31

## 2020-01-31 RX ORDER — FENTANYL CITRATE 50 UG/ML
25 INJECTION, SOLUTION INTRAMUSCULAR; INTRAVENOUS EVERY 5 MIN PRN
Status: DISCONTINUED | OUTPATIENT
Start: 2020-01-31 | End: 2020-01-31 | Stop reason: HOSPADM

## 2020-01-31 RX ORDER — LOSARTAN POTASSIUM 25 MG/1
12.5 TABLET ORAL EVERY MORNING
Status: DISCONTINUED | OUTPATIENT
Start: 2020-02-01 | End: 2020-02-01 | Stop reason: HOSPADM

## 2020-01-31 RX ORDER — ROCURONIUM BROMIDE 10 MG/ML
INJECTION, SOLUTION INTRAVENOUS PRN
Status: DISCONTINUED | OUTPATIENT
Start: 2020-01-31 | End: 2020-01-31 | Stop reason: SDUPTHER

## 2020-01-31 RX ORDER — FENTANYL CITRATE 50 UG/ML
INJECTION, SOLUTION INTRAMUSCULAR; INTRAVENOUS PRN
Status: DISCONTINUED | OUTPATIENT
Start: 2020-01-31 | End: 2020-01-31 | Stop reason: SDUPTHER

## 2020-01-31 RX ORDER — ASPIRIN 81 MG/1
81 TABLET, CHEWABLE ORAL NIGHTLY
Status: DISCONTINUED | OUTPATIENT
Start: 2020-01-31 | End: 2020-02-01 | Stop reason: HOSPADM

## 2020-01-31 RX ORDER — CEFAZOLIN SODIUM 2 G/50ML
2 SOLUTION INTRAVENOUS EVERY 8 HOURS
Status: DISCONTINUED | OUTPATIENT
Start: 2020-01-31 | End: 2020-01-31 | Stop reason: CLARIF

## 2020-01-31 RX ORDER — SENNA AND DOCUSATE SODIUM 50; 8.6 MG/1; MG/1
1 TABLET, FILM COATED ORAL 2 TIMES DAILY
Status: DISCONTINUED | OUTPATIENT
Start: 2020-01-31 | End: 2020-02-01 | Stop reason: HOSPADM

## 2020-01-31 RX ORDER — GLYCOPYRROLATE 1 MG/5 ML
SYRINGE (ML) INTRAVENOUS PRN
Status: DISCONTINUED | OUTPATIENT
Start: 2020-01-31 | End: 2020-01-31 | Stop reason: SDUPTHER

## 2020-01-31 RX ORDER — LABETALOL 20 MG/4 ML (5 MG/ML) INTRAVENOUS SYRINGE
5 EVERY 10 MIN PRN
Status: DISCONTINUED | OUTPATIENT
Start: 2020-01-31 | End: 2020-01-31 | Stop reason: HOSPADM

## 2020-01-31 RX ORDER — NITROGLYCERIN 0.4 MG/1
0.4 TABLET SUBLINGUAL EVERY 5 MIN PRN
Status: DISCONTINUED | OUTPATIENT
Start: 2020-01-31 | End: 2020-02-01 | Stop reason: HOSPADM

## 2020-01-31 RX ORDER — DOCUSATE SODIUM 100 MG/1
100 CAPSULE, LIQUID FILLED ORAL 2 TIMES DAILY
Status: DISCONTINUED | OUTPATIENT
Start: 2020-01-31 | End: 2020-02-01 | Stop reason: HOSPADM

## 2020-01-31 RX ORDER — SODIUM CHLORIDE, SODIUM LACTATE, POTASSIUM CHLORIDE, CALCIUM CHLORIDE 600; 310; 30; 20 MG/100ML; MG/100ML; MG/100ML; MG/100ML
INJECTION, SOLUTION INTRAVENOUS CONTINUOUS
Status: DISCONTINUED | OUTPATIENT
Start: 2020-01-31 | End: 2020-02-01 | Stop reason: HOSPADM

## 2020-01-31 RX ORDER — ROPIVACAINE HYDROCHLORIDE 5 MG/ML
INJECTION, SOLUTION EPIDURAL; INFILTRATION; PERINEURAL
Status: COMPLETED | OUTPATIENT
Start: 2020-01-31 | End: 2020-01-31

## 2020-01-31 RX ORDER — OXYCODONE HYDROCHLORIDE 5 MG/1
5 TABLET ORAL EVERY 4 HOURS PRN
Status: DISCONTINUED | OUTPATIENT
Start: 2020-01-31 | End: 2020-02-01 | Stop reason: HOSPADM

## 2020-01-31 RX ORDER — HYDRALAZINE HYDROCHLORIDE 20 MG/ML
5 INJECTION INTRAMUSCULAR; INTRAVENOUS EVERY 10 MIN PRN
Status: DISCONTINUED | OUTPATIENT
Start: 2020-01-31 | End: 2020-01-31 | Stop reason: HOSPADM

## 2020-01-31 RX ADMIN — PROPOFOL 100 MG: 10 INJECTION, EMULSION INTRAVENOUS at 13:08

## 2020-01-31 RX ADMIN — CEFAZOLIN SODIUM 2 G: 2 INJECTION, SOLUTION INTRAVENOUS at 20:47

## 2020-01-31 RX ADMIN — FENTANYL CITRATE 50 MCG: 50 INJECTION INTRAMUSCULAR; INTRAVENOUS at 15:12

## 2020-01-31 RX ADMIN — ROCURONIUM BROMIDE 50 MG: 10 INJECTION INTRAVENOUS at 13:08

## 2020-01-31 RX ADMIN — SODIUM CHLORIDE, POTASSIUM CHLORIDE, SODIUM LACTATE AND CALCIUM CHLORIDE: 600; 310; 30; 20 INJECTION, SOLUTION INTRAVENOUS at 14:31

## 2020-01-31 RX ADMIN — SODIUM CHLORIDE, POTASSIUM CHLORIDE, SODIUM LACTATE AND CALCIUM CHLORIDE: 600; 310; 30; 20 INJECTION, SOLUTION INTRAVENOUS at 13:02

## 2020-01-31 RX ADMIN — CARVEDILOL 6.25 MG: 6.25 TABLET, FILM COATED ORAL at 17:39

## 2020-01-31 RX ADMIN — CEFAZOLIN SODIUM 2 G: 2 INJECTION, SOLUTION INTRAVENOUS at 13:14

## 2020-01-31 RX ADMIN — Medication 0.2 MG: at 15:20

## 2020-01-31 RX ADMIN — ROCURONIUM BROMIDE 10 MG: 10 INJECTION INTRAVENOUS at 14:35

## 2020-01-31 RX ADMIN — FENTANYL CITRATE 50 MCG: 50 INJECTION INTRAMUSCULAR; INTRAVENOUS at 15:09

## 2020-01-31 RX ADMIN — ROCURONIUM BROMIDE 10 MG: 10 INJECTION INTRAVENOUS at 14:11

## 2020-01-31 RX ADMIN — TRANEXAMIC ACID 1000 MG: 1 INJECTION, SOLUTION INTRAVENOUS at 16:14

## 2020-01-31 RX ADMIN — ROPIVACAINE HYDROCHLORIDE 20 ML: 5 INJECTION, SOLUTION EPIDURAL; INFILTRATION; PERINEURAL at 14:08

## 2020-01-31 RX ADMIN — EPHEDRINE SULFATE 10 MG: 50 INJECTION, SOLUTION INTRAVENOUS at 15:20

## 2020-01-31 RX ADMIN — ROCURONIUM BROMIDE 10 MG: 10 INJECTION INTRAVENOUS at 13:46

## 2020-01-31 RX ADMIN — DEXAMETHASONE SODIUM PHOSPHATE 4 MG: 4 INJECTION, SOLUTION INTRAMUSCULAR; INTRAVENOUS at 13:53

## 2020-01-31 RX ADMIN — SODIUM CHLORIDE, POTASSIUM CHLORIDE, SODIUM LACTATE AND CALCIUM CHLORIDE 1000 ML: 600; 310; 30; 20 INJECTION, SOLUTION INTRAVENOUS at 10:44

## 2020-01-31 RX ADMIN — SUGAMMADEX 200 MG: 100 INJECTION, SOLUTION INTRAVENOUS at 15:12

## 2020-01-31 RX ADMIN — ACETAMINOPHEN 650 MG: 325 TABLET ORAL at 17:39

## 2020-01-31 RX ADMIN — ONDANSETRON 4 MG: 2 INJECTION INTRAMUSCULAR; INTRAVENOUS at 15:09

## 2020-01-31 RX ADMIN — GABAPENTIN 100 MG: 100 CAPSULE ORAL at 20:47

## 2020-01-31 RX ADMIN — LIDOCAINE HYDROCHLORIDE 100 MG: 20 INJECTION, SOLUTION INTRAVENOUS at 13:08

## 2020-01-31 RX ADMIN — ASPIRIN 81 MG 81 MG: 81 TABLET ORAL at 20:47

## 2020-01-31 RX ADMIN — ACETAMINOPHEN 1000 MG: 10 INJECTION, SOLUTION INTRAVENOUS at 14:10

## 2020-01-31 RX ADMIN — SODIUM CHLORIDE, POTASSIUM CHLORIDE, SODIUM LACTATE AND CALCIUM CHLORIDE: 600; 310; 30; 20 INJECTION, SOLUTION INTRAVENOUS at 16:12

## 2020-01-31 ASSESSMENT — PULMONARY FUNCTION TESTS
PIF_VALUE: 27
PIF_VALUE: 17
PIF_VALUE: 18
PIF_VALUE: 17
PIF_VALUE: 16
PIF_VALUE: 18
PIF_VALUE: 16
PIF_VALUE: 17
PIF_VALUE: 17
PIF_VALUE: 16
PIF_VALUE: 17
PIF_VALUE: 18
PIF_VALUE: 17
PIF_VALUE: 16
PIF_VALUE: 18
PIF_VALUE: 16
PIF_VALUE: 16
PIF_VALUE: 3
PIF_VALUE: 17
PIF_VALUE: 19
PIF_VALUE: 17
PIF_VALUE: 16
PIF_VALUE: 16
PIF_VALUE: 17
PIF_VALUE: 19
PIF_VALUE: 1
PIF_VALUE: 18
PIF_VALUE: 17
PIF_VALUE: 17
PIF_VALUE: 19
PIF_VALUE: 18
PIF_VALUE: 16
PIF_VALUE: 1
PIF_VALUE: 19
PIF_VALUE: 15
PIF_VALUE: 18
PIF_VALUE: 17
PIF_VALUE: 19
PIF_VALUE: 17
PIF_VALUE: 18
PIF_VALUE: 19
PIF_VALUE: 0
PIF_VALUE: 15
PIF_VALUE: 0
PIF_VALUE: 18
PIF_VALUE: 17
PIF_VALUE: 15
PIF_VALUE: 18
PIF_VALUE: 18
PIF_VALUE: 16
PIF_VALUE: 18
PIF_VALUE: 18
PIF_VALUE: 16
PIF_VALUE: 20
PIF_VALUE: 0
PIF_VALUE: 18
PIF_VALUE: 18
PIF_VALUE: 19
PIF_VALUE: 17
PIF_VALUE: 16
PIF_VALUE: 0
PIF_VALUE: 2
PIF_VALUE: 18
PIF_VALUE: 20
PIF_VALUE: 17
PIF_VALUE: 18
PIF_VALUE: 19
PIF_VALUE: 31
PIF_VALUE: 18
PIF_VALUE: 16
PIF_VALUE: 17
PIF_VALUE: 19
PIF_VALUE: 17
PIF_VALUE: 18
PIF_VALUE: 0
PIF_VALUE: 18
PIF_VALUE: 18
PIF_VALUE: 19
PIF_VALUE: 18
PIF_VALUE: 18
PIF_VALUE: 19
PIF_VALUE: 18
PIF_VALUE: 17
PIF_VALUE: 18
PIF_VALUE: 19
PIF_VALUE: 17
PIF_VALUE: 17
PIF_VALUE: 19
PIF_VALUE: 2
PIF_VALUE: 19
PIF_VALUE: 18
PIF_VALUE: 18
PIF_VALUE: 19
PIF_VALUE: 19
PIF_VALUE: 14
PIF_VALUE: 16
PIF_VALUE: 18
PIF_VALUE: 16
PIF_VALUE: 19
PIF_VALUE: 16
PIF_VALUE: 17
PIF_VALUE: 1
PIF_VALUE: 17
PIF_VALUE: 19
PIF_VALUE: 18
PIF_VALUE: 31
PIF_VALUE: 18
PIF_VALUE: 1
PIF_VALUE: 17
PIF_VALUE: 14
PIF_VALUE: 18
PIF_VALUE: 16
PIF_VALUE: 17
PIF_VALUE: 18
PIF_VALUE: 18
PIF_VALUE: 19
PIF_VALUE: 19
PIF_VALUE: 16
PIF_VALUE: 19
PIF_VALUE: 16
PIF_VALUE: 3
PIF_VALUE: 19
PIF_VALUE: 19
PIF_VALUE: 18
PIF_VALUE: 20
PIF_VALUE: 17
PIF_VALUE: 19
PIF_VALUE: 18
PIF_VALUE: 19
PIF_VALUE: 19
PIF_VALUE: 18
PIF_VALUE: 18

## 2020-01-31 ASSESSMENT — PAIN SCALES - GENERAL
PAINLEVEL_OUTOF10: 0

## 2020-01-31 ASSESSMENT — PAIN - FUNCTIONAL ASSESSMENT: PAIN_FUNCTIONAL_ASSESSMENT: 0-10

## 2020-01-31 ASSESSMENT — LIFESTYLE VARIABLES: SMOKING_STATUS: 0

## 2020-01-31 NOTE — PROGRESS NOTES
1230- To PACU holding area fro pre-op nerve block  1240- Inter-scalene block to left shoulder  performed per Dr. Meri Rondon.

## 2020-01-31 NOTE — PROGRESS NOTES
1533: Arrived to PACU from OR. Monitors applied, alarms on. Report obtained from [facility] and KIMBERLYN Elam. 1555: X-rays taken left shoulder. 1605: Turned and repositioned in bed. Denies pain, able to wiggle fingers left hand and states sensation normal both hands. Ice chips given. 1638: Transported to room 4002. Family at bedside.

## 2020-01-31 NOTE — H&P
PLACEMENT  10/2016    10/2016, 2/12/16, 5/16, 8/16 7 stents total from 4 caths in 2016 = 6 stents    CORONARY ARTERY BYPASS GRAFT  11/27/2006    x 3    ELBOW FRACTURE SURGERY Left 1996    no hardware    EYE SURGERY Bilateral 2013   [address]    from 2323-2115 had 6 hernia surgies, unable to recall even estimates of years    TONSILLECTOMY  1950's       Medications Prior to Admission:     Prior to Admission medications    Medication Sig Start Date End Date Taking? Authorizing Provider   PRALUENT 75 MG/ML SOPN injection pen INJECT 1ML INTO THE SKIN EVERY 14 DAYS 3/22/19   Anthony Espinoza MD   BRILINTA 90 MG TABS tablet TAKE 1 TABLET TWICE A DAY 3/1/18   Anthony Espinoza MD   nitroGLYCERIN (NITROSTAT) 0.4 MG SL tablet Place 1 tablet under the tongue every 5 minutes as needed for Chest pain 9/1/16   Anthony Espinoza MD   atorvastatin (LIPITOR) 40 MG tablet Take 1 tablet by mouth daily  Patient taking differently: Take 40 mg by mouth every morning  9/1/16   Anthony Espinoza MD   naproxen (NAPROSYN) 500 MG tablet Take 500 mg by mouth 2 times daily (with meals)    Historical Provider, MD   gabapentin (NEURONTIN) 100 MG capsule Take 100 mg by mouth 3 times daily    Historical Provider, MD   traMADol (ULTRAM) 50 MG tablet Take 50 mg by mouth every 6 hours as needed for Pain    Historical Provider, MD   famotidine (PEPCID) 20 MG tablet Take 20 mg by mouth 2 times daily    Historical Provider, MD   multivitamin (OCUVITE) TABS per tablet Take 1 tablet by mouth daily. Historical Provider, MD   losartan (COZAAR) 25 MG tablet Take 12.5 mg by mouth every morning 0.5 tablet daily     Historical Provider, MD   carvedilol (COREG) 6.25 MG tablet Take 6.25 mg by mouth 2 times daily (with meals).     Historical Provider, MD   Omega-3 Fatty Acids (FISH OIL) 1000 MG CAPS Take 1,000 mg by mouth daily     Historical Provider, MD   aspirin 81 MG chewable tablet Take 81 mg by mouth nightly     Historical results for input(s): INR in the last 72 hours. X-ray view   Imaging Review  Left shoulder MRI: Date - 09/10/19. Test location - [facility]. Test results - IMPRESSION:  1. Chronic full-thickness rotator cuff tears of the supraspinatus and infraspinatus tendons with tendon retraction and muscle atrophy. 2. Rupture of the intra-articular long head biceps tendon with cranial migration of the humeral head. 3. Mild arthrosis of the glenohumeral joint with small joint effusion. 4. Arthrosis of the AC joint. Assessment and Plan       1. Left massive retracted full-thickness rotator cuff tear with atrophy  2. Left shoulder rotator cuff arthropathy  3. Left knee end-stage primary osteoarthritis  4. Right shoulder massive rotator cuff tear  5. Recent sciatica new onset left foot drop    Plan  1. Left shoulder reverse arthroplasty    Benny Dickey is complaining of continued LEFT shoulder pain. He states that his cardiologist has recommended that he have surgery at [facility] due to his cardiac history. He has not attended joint class thus far. He states that he would like to proceed with surgery. Benny Dickey states that his left shoulder is now becoming increasingly painful. He was considering a left knee replacement however he developed sciatica and a left foot drop and is currently undergoing physical therapy for this    His left shoulder is coming increasingly painful and functionally disabling. He has been seen by Dr. Michele Albarran over the course of many years for his right shoulder. He has difficulty performing any types of overhead activities. He has constant aching pain throughout the day which is worse with any attempted repetitive lifting or reaching out away from his body. He is an avid fisherman is now finding it difficult a fish. He is also having pain at night    Left shoulder MRI: Date - 09/10/19. Test location - [facility]. Test results - IMPRESSION:  1.  Chronic full-thickness rotator cuff tears of the

## 2020-02-01 VITALS
RESPIRATION RATE: 16 BRPM | DIASTOLIC BLOOD PRESSURE: 55 MMHG | HEART RATE: 73 BPM | BODY MASS INDEX: 32.64 KG/M2 | SYSTOLIC BLOOD PRESSURE: 121 MMHG | HEIGHT: 72 IN | TEMPERATURE: 97.8 F | WEIGHT: 241 LBS | OXYGEN SATURATION: 93 %

## 2020-02-01 LAB
ALBUMIN SERPL-MCNC: 3.7 GM/DL (ref 3.4–5)
ALP BLD-CCNC: 81 IU/L (ref 40–128)
ALT SERPL-CCNC: 9 U/L (ref 10–40)
ANION GAP SERPL CALCULATED.3IONS-SCNC: 14 MMOL/L (ref 4–16)
AST SERPL-CCNC: 25 IU/L (ref 15–37)
BASOPHILS ABSOLUTE: 0 K/CU MM
BASOPHILS RELATIVE PERCENT: 0.1 % (ref 0–1)
BILIRUB SERPL-MCNC: 0.4 MG/DL (ref 0–1)
BUN BLDV-MCNC: 16 MG/DL (ref 6–23)
CALCIUM SERPL-MCNC: 8.7 MG/DL (ref 8.3–10.6)
CHLORIDE BLD-SCNC: 100 MMOL/L (ref 99–110)
CO2: 23 MMOL/L (ref 21–32)
CREAT SERPL-MCNC: 1.1 MG/DL (ref 0.9–1.3)
DIFFERENTIAL TYPE: ABNORMAL
EOSINOPHILS ABSOLUTE: 0 K/CU MM
EOSINOPHILS RELATIVE PERCENT: 0 % (ref 0–3)
GFR AFRICAN AMERICAN: >60 ML/MIN/1.73M2
GFR NON-AFRICAN AMERICAN: >60 ML/MIN/1.73M2
GLUCOSE BLD-MCNC: 188 MG/DL (ref 70–99)
HCT VFR BLD CALC: 40.5 % (ref 42–52)
HEMOGLOBIN: 13.6 GM/DL (ref 13.5–18)
IMMATURE NEUTROPHIL %: 0.3 % (ref 0–0.43)
LYMPHOCYTES ABSOLUTE: 0.9 K/CU MM
LYMPHOCYTES RELATIVE PERCENT: 6.5 % (ref 24–44)
MCH RBC QN AUTO: 30 PG (ref 27–31)
MCHC RBC AUTO-ENTMCNC: 33.6 % (ref 32–36)
MCV RBC AUTO: 89.4 FL (ref 78–100)
MONOCYTES ABSOLUTE: 1 K/CU MM
MONOCYTES RELATIVE PERCENT: 7.7 % (ref 0–4)
NUCLEATED RBC %: 0 %
PDW BLD-RTO: 12.5 % (ref 11.7–14.9)
PLATELET # BLD: 127 K/CU MM (ref 140–440)
PMV BLD AUTO: 11.3 FL (ref 7.5–11.1)
POTASSIUM SERPL-SCNC: 5 MMOL/L (ref 3.5–5.1)
RBC # BLD: 4.53 M/CU MM (ref 4.6–6.2)
SEGMENTED NEUTROPHILS ABSOLUTE COUNT: 11.3 K/CU MM
SEGMENTED NEUTROPHILS RELATIVE PERCENT: 85.4 % (ref 36–66)
SODIUM BLD-SCNC: 137 MMOL/L (ref 135–145)
TOTAL IMMATURE NEUTOROPHIL: 0.04 K/CU MM
TOTAL NUCLEATED RBC: 0 K/CU MM
TOTAL PROTEIN: 5.7 GM/DL (ref 6.4–8.2)
WBC # BLD: 13.2 K/CU MM (ref 4–10.5)

## 2020-02-01 PROCEDURE — 2580000003 HC RX 258: Performed by: ORTHOPAEDIC SURGERY

## 2020-02-01 PROCEDURE — 97110 THERAPEUTIC EXERCISES: CPT

## 2020-02-01 PROCEDURE — 80053 COMPREHEN METABOLIC PANEL: CPT

## 2020-02-01 PROCEDURE — 6360000002 HC RX W HCPCS: Performed by: ORTHOPAEDIC SURGERY

## 2020-02-01 PROCEDURE — 85025 COMPLETE CBC W/AUTO DIFF WBC: CPT

## 2020-02-01 PROCEDURE — 36415 COLL VENOUS BLD VENIPUNCTURE: CPT

## 2020-02-01 PROCEDURE — 6370000000 HC RX 637 (ALT 250 FOR IP): Performed by: ORTHOPAEDIC SURGERY

## 2020-02-01 PROCEDURE — 97162 PT EVAL MOD COMPLEX 30 MIN: CPT

## 2020-02-01 RX ORDER — OXYCODONE HYDROCHLORIDE AND ACETAMINOPHEN 5; 325 MG/1; MG/1
1-2 TABLET ORAL EVERY 6 HOURS PRN
Qty: 50 TABLET | Refills: 0
Start: 2020-02-01 | End: 2020-02-08

## 2020-02-01 RX ADMIN — ACETAMINOPHEN 650 MG: 325 TABLET ORAL at 04:48

## 2020-02-01 RX ADMIN — CEFAZOLIN SODIUM 2 G: 2 INJECTION, SOLUTION INTRAVENOUS at 04:48

## 2020-02-01 RX ADMIN — CARVEDILOL 6.25 MG: 6.25 TABLET, FILM COATED ORAL at 10:05

## 2020-02-01 RX ADMIN — OXYCODONE HYDROCHLORIDE 5 MG: 5 TABLET ORAL at 14:59

## 2020-02-01 RX ADMIN — GABAPENTIN 100 MG: 100 CAPSULE ORAL at 10:07

## 2020-02-01 RX ADMIN — DOCUSATE SODIUM 50MG AND SENNOSIDES 8.6MG 1 TABLET: 8.6; 5 TABLET, FILM COATED ORAL at 10:04

## 2020-02-01 RX ADMIN — ENOXAPARIN SODIUM 30 MG: 30 INJECTION SUBCUTANEOUS at 10:09

## 2020-02-01 RX ADMIN — OXYCODONE HYDROCHLORIDE 5 MG: 5 TABLET ORAL at 10:07

## 2020-02-01 RX ADMIN — LOSARTAN POTASSIUM 12.5 MG: 25 TABLET, FILM COATED ORAL at 10:06

## 2020-02-01 RX ADMIN — DOCUSATE SODIUM 100 MG: 100 CAPSULE, LIQUID FILLED ORAL at 10:05

## 2020-02-01 RX ADMIN — ATORVASTATIN CALCIUM 40 MG: 40 TABLET, FILM COATED ORAL at 10:07

## 2020-02-01 RX ADMIN — FAMOTIDINE 20 MG: 20 TABLET, FILM COATED ORAL at 10:05

## 2020-02-01 RX ADMIN — SODIUM CHLORIDE, PRESERVATIVE FREE 10 ML: 5 INJECTION INTRAVENOUS at 10:10

## 2020-02-01 ASSESSMENT — PAIN SCALES - GENERAL
PAINLEVEL_OUTOF10: 0
PAINLEVEL_OUTOF10: 4
PAINLEVEL_OUTOF10: 4

## 2020-02-01 ASSESSMENT — PAIN DESCRIPTION - DESCRIPTORS: DESCRIPTORS: ACHING;DISCOMFORT

## 2020-02-01 ASSESSMENT — PAIN DESCRIPTION - ONSET: ONSET: ON-GOING

## 2020-02-01 ASSESSMENT — PAIN DESCRIPTION - PROGRESSION: CLINICAL_PROGRESSION: NOT CHANGED

## 2020-02-01 ASSESSMENT — PAIN DESCRIPTION - LOCATION: LOCATION: SHOULDER

## 2020-02-01 ASSESSMENT — PAIN DESCRIPTION - PAIN TYPE: TYPE: SURGICAL PAIN

## 2020-02-01 ASSESSMENT — PAIN - FUNCTIONAL ASSESSMENT: PAIN_FUNCTIONAL_ASSESSMENT: PREVENTS OR INTERFERES SOME ACTIVE ACTIVITIES AND ADLS

## 2020-02-01 ASSESSMENT — PAIN DESCRIPTION - FREQUENCY: FREQUENCY: CONTINUOUS

## 2020-02-01 ASSESSMENT — PAIN DESCRIPTION - ORIENTATION: ORIENTATION: LEFT

## 2020-02-01 NOTE — PLAN OF CARE
Problem: Safety:  Goal: Free from accidental physical injury  Description  Free from accidental physical injury  Outcome: Ongoing  Goal: Free from intentional harm  Description  Free from intentional harm  Outcome: Ongoing     Problem: Daily Care:  Goal: Daily care needs are met  Description  Daily care needs are met  Outcome: Ongoing     Problem: Pain:  Goal: Patient's pain/discomfort is manageable  Description  Patient's pain/discomfort is manageable  Outcome: Ongoing     Problem: Skin Integrity:  Goal: Skin integrity will stabilize  Description  Skin integrity will stabilize  Outcome: Ongoing

## 2020-02-01 NOTE — DISCHARGE SUMMARY
DISCHARGE SUMMARY:  Marcy Brady MD     Date of Admission:      1/31/2020     Date of Discharge:     2/1/2020    Admission Diagnosis   History of left shoulder replacement [Z96.612]     Discharge Diagnosis   Same    Procedure:    Left Reverse Shoulder Replacement 1/31/2020    Condition on Discharge: Stable    Consultations:  Dr. Elias Denise history and hospital stay. The Obey Mcghee is a 79 y.o. male underwent a Left reverese total shoulder replacement procedure without complication. Obey Mcghee was admitted to the floor following surgery. Patient was admitted to the floor and appropriate consults were obtained as outlined in progress notes. The patients diet was advanced as tolerated per recommendations. The patient remained neurovascularly intact in the lower extremity and had intact pulses. Patients calf remained soft and showed no evidence of DVT. The patient was able to move their affected extremity without any problems as their hospital course progressed. Physical therapy and occupational therapy were consulted and began working with the patient during their stay. The patient progressed with PT/OT as would be expected and continued to improve through their stay. Hemoglobin was monitored and DVT prophylaxis was given. The patients pain was controlled with the use off pain medications soon after arrival to the floor and their pain remained under good control through their hospital stay. From a medical standpoint the patient remained stable and continued to have the medicine team follow throughout their stay. The patient will be discharged at this time to Home with their current diet restrictions and will continue to follow the precautions outlined to them by us and PT/OT.        Medications     Sean Starr   Home Medication Instructions DKK:[id number]    Printed on:02/01/20 [id number]   Medication

## 2020-02-01 NOTE — CONSULTS
[facility] PHYSICAL THERAPY EVALUATION  Abby Jensen [id number], 1940, 4002/4002-A, 2/1/2020    Discharge Recommendation: Home, outpatient PT, PRN family assist    Equipment: no needs    History  [place]:  The encounter diagnosis was History of left shoulder replacement. Subjective:  Patient states:  Agreeable to PT evaluation. Denies pain. Pain:  Denies . Communication with other providers: OT  Restrictions: low fall risk, NWB, ER <45 deg    Home Setup/Prior level of function  Social/Functional History  Lives With: Spouse  Type of Home: House  Home Layout: One level  Home Access: Stairs to enter without rails  Bathroom Equipment: Shower chair  Home Equipment: Rolling walker, Cane    Examination of body systems (includes body structures/functions, activity/participation limitations):  · Observation:  Supine in bed upon arrival, sling on   · [place]:  [employer] [place]  · [facility] [place]  · Cardiopulmonary:  VSS  · Cognition: WFL, see OT/SLP note for further evaluation. Body Structures/Function  · ROM R/L:  WFL. · Strength R/L:  5/5 LE's. Grossly 2-/5 LUE, weakness observed in function. · Neuro:  Normal post-op NM control deficits       Mobility:  · Rolling L/R: SBA  · Supine to sit:  SBA- completes without WB through L UE  · Transfers: SBA-- good strength and power, good immediate standing balance, cues for posture  · Sitting balance:  ind.    · Standing balance:  SBA no device . · Gait: 250 ft without device, straight path, no LOB, no SOB, navigates obstacles seamlessly     [facility] 6 Clicks Inpatient Mobility:   Current: 21/24  PLOF: 24/24    Treatment:  Therapeutic Activity Training:   Therapeutic activity training was instructed today. Cues were given for safety, sequence, UE/LE placement, awareness, and balance. Activities performed today included bed mobility training, sup-sit, sit-stand, SPT.   Therapeutic Exercise:  Cues were given for technique, safety, recruitment, and rationale. Cues were verbal and/or tactile. Pt and wife educated on sling positioning and donning/doffing  Active assisted shoulder flexion and abduction   Active ER to 45 deg  Educated on ROM restrictions and rationale  Safety:  patient left in chair, call light within reach, RN notified, gait belt used. Assessment:  Pt is a 78 yo male who presents to [facility] with reverse total shoulder arthroplasty left. PT evaluation POD 1. Pain well controlled. Pt with reduced NM control of L shoulder mms. His functional mobility is safe for home dc. He will benefit from continued PT on outpatient basis upon dc from hospital.  Activity Tolerance: good  Complexity: Moderate  Prognosis: Good, no significant barriers to participation at this time. Plan  Days/week: dc order in place.  No further POC for acute care      Recommendations for NURSING mobility: SBA in [place]    Time:   Time in: 0859  Time out: 0922  Timed treatment minutes: 13  Total time: 23    Electronically signed by:    Alistair Zimmerman, PT  2/1/2020, 12:46 PM

## 2020-02-01 NOTE — CONSULTS
day, multivitamin once a  day, losartan 25 mg half a tablet daily, Coreg 6.25 mg two times a day,  omega-3 fatty acid 1000 mg oral daily, aspirin 81 mg daily, Brilinta 90  mg daily, nitro p.r.n., tramadol 50 every six hours as needed. REVIEW OF SYSTEMS:  CONSTITUTIONAL:  No documented history of fever. The patient denies any  chills. HEENT:  Negative. RS:  Negative. CVS:  Negative. GI:  Negative. :  Negative. ENDOCRINE:  No polyuria, polydipsia. PSYCHIATRIC:  Negative. ALLERGY/IMMUNOLOGY:  Negative. NEUROLOGIC:  Denies any focal weakness. PHYSICAL EXAMINATION:  GENERAL:  Shows that he is in pleasant mood, does not seem in distress  at all. VITAL SIGNS:  Show temperature is 97.8, pulse 66, respiratory rate 16,  BP is 135/70, O2 is 99%. HEENT:  The patient has normocephalic head. No sinus tenderness. NECK:  Without any stiffness or thyromegaly. RS:  The patient has a fair air entry. No wheezing or rhonchi. CARDIOVASCULAR SYSTEM:  S1 and S2 present. Rate and rhythm are regular. ABDOMEN:  Soft and nontender. Bowel sounds are present. EXTREMITIES:  Without any clubbing, cyanosis, or edema. CNS:  Nonfocal.    INVESTIGATIONS:  The patient's CMP is within normal limits. CBC shows  WBC count of 13. 2. X-ray shoulder, status post reverse left shoulder arthroplasty. Blood sugar was 142. ASSESSMENT:  1. Left shoulder pain, status post left shoulder arthroplasty. 2.  Coronary artery disease. No perioperative event. 3.  Hypertension. 4.  Hyperlipidemia. 5.  History of carotid stenosis. PLAN:  At this time, I will continue the patient's current medications. The patient's overall condition is fair at this time. The patient's  condition will be monitored closely. From a medical standpoint, the  patient is very stable, and if he is appropriate, Ortho can discharge  this patient today.         MAULIK JAUREGUI    D: 02/01/2020 15:20:53       T: 02/01/2020 15:32:07 [email or web address]  Job#: [id number]     Doc#: [id number]    CC:

## 2020-02-01 NOTE — PROGRESS NOTES
Occupational Therapy Screen  Visited pt for evaluation. Pt was ambualting with physical therpist and nearing end of physical therapy evaluation. Introduced self and role OT. Pt confirmed practicing donning/doffing sling, reviewing ROM and activity restrictions with the physical therapist. Pt denied further needs and acknowledges his wife can assist him with bathing and dressing during initial recovery.     [address], OTR/L, [place]   [id number]   2:22 PM, 2/1/2020

## 2020-04-01 RX ORDER — ALIROCUMAB 75 MG/ML
75 INJECTION, SOLUTION SUBCUTANEOUS
Qty: 6 PEN | Refills: 3 | Status: ON HOLD | OUTPATIENT
Start: 2020-04-01 | End: 2020-12-14 | Stop reason: HOSPADM

## 2020-10-06 ENCOUNTER — OFFICE VISIT (OUTPATIENT)
Dept: CARDIOLOGY CLINIC | Age: 80
End: 2020-10-06
Payer: MEDICARE

## 2020-10-06 VITALS
BODY MASS INDEX: 32.23 KG/M2 | WEIGHT: 238 LBS | HEART RATE: 76 BPM | SYSTOLIC BLOOD PRESSURE: 128 MMHG | HEIGHT: 72 IN | DIASTOLIC BLOOD PRESSURE: 80 MMHG

## 2020-10-06 PROCEDURE — G8417 CALC BMI ABV UP PARAM F/U: HCPCS | Performed by: INTERNAL MEDICINE

## 2020-10-06 PROCEDURE — 1036F TOBACCO NON-USER: CPT | Performed by: INTERNAL MEDICINE

## 2020-10-06 PROCEDURE — G8427 DOCREV CUR MEDS BY ELIG CLIN: HCPCS | Performed by: INTERNAL MEDICINE

## 2020-10-06 PROCEDURE — 4040F PNEUMOC VAC/ADMIN/RCVD: CPT | Performed by: INTERNAL MEDICINE

## 2020-10-06 PROCEDURE — 1123F ACP DISCUSS/DSCN MKR DOCD: CPT | Performed by: INTERNAL MEDICINE

## 2020-10-06 PROCEDURE — G8484 FLU IMMUNIZE NO ADMIN: HCPCS | Performed by: INTERNAL MEDICINE

## 2020-10-06 PROCEDURE — 99214 OFFICE O/P EST MOD 30 MIN: CPT | Performed by: INTERNAL MEDICINE

## 2020-10-06 NOTE — PROGRESS NOTES
Briseyda Evans is a 79 y.o. male who has    CHIEF COMPLAINT AS FOLLOWS:  CHEST PAIN: Patient denies any C/O chest pains at this time. SOB: No C/O SOB at this time. LEG EDEMA: No leg edema   PALPITATIONS: Denies any C/O Palpitations   DIZZINESS: No C/O Dizziness   SYNCOPE: None   OTHER:                                     HPI: Patient is here for F/U on his CAD, HTN & Dyslipidemia problems. He does not have any complaints at this time.     Moncho Mcgee has the following history recorded in care path:  Patient Active Problem List    Diagnosis Date Noted    Stenosis of right carotid artery 12/01/2016     Priority: Low    Bilateral [facility] ([facility] 10/20/2016     Priority: Low    [facility] at rest [facility])      Priority: Low    Abnormal carotid ultrasound      Priority: Low    Unstable angina ([facility] 09/30/2015     Priority: Low    Abnormal nuclear stress test 03/30/2015     Priority: Low    Chest pain 03/30/2015     Priority: Low    CAD (coronary artery disease)      Priority: Low    SOB (shortness of breath)      Priority: Low    Hyperlipemia      Priority: Low    S/P CABG x 3      Priority: Low    HTN (hypertension)      Priority: Low    S/P angioplasty      Priority: Low    MI (myocardial infarction) ([facility]      Priority: Low    H/O cardiovascular stress test 08/21/2013     Priority: Low    History of left shoulder replacement 01/31/2020     Current Outpatient Medications   Medication Sig Dispense Refill    alirocumab (PRALUENT) 75 MG/ML SOAJ injection pen Inject 1 mL into the skin every 14 days 6 pen 3    PRALUENT 75 MG/ML SOPN injection pen INJECT 1ML INTO THE SKIN EVERY 14 DAYS 6 pen 3    BRILINTA 90 MG TABS tablet TAKE 1 TABLET TWICE A DAY 90 tablet 3    PRALUENT 75 MG/ML SOPN injection pen Inject 1 mL into the skin every 14 days 6 Pen 2    nitroGLYCERIN (NITROSTAT) 0.4 MG SL tablet Place 1 tablet under the tongue every 5 minutes as needed for Chest pain 25 tablet 2    atorvastatin (LIPITOR) 40 MG tablet Take 1 tablet by mouth daily (Patient taking differently: Take 40 mg by mouth every morning ) 90 tablet 0    naproxen (NAPROSYN) 500 MG tablet Take 500 mg by mouth 2 times daily (with meals)      gabapentin (NEURONTIN) 100 MG capsule Take 100 mg by mouth 3 times daily      traMADol (ULTRAM) 50 MG tablet Take 50 mg by mouth every 6 hours as needed for Pain      famotidine (PEPCID) 20 MG tablet Take 20 mg by mouth 2 times daily      multivitamin (OCUVITE) TABS per tablet Take 1 tablet by mouth daily.  losartan (COZAAR) 25 MG tablet Take 12.5 mg by mouth every morning 0.5 tablet daily       carvedilol (COREG) 6.25 MG tablet Take 6.25 mg by mouth 2 times daily (with meals).  Omega-3 Fatty Acids (FISH OIL) 1000 MG CAPS Take 1,000 mg by mouth daily       aspirin 81 MG chewable tablet Take 81 mg by mouth nightly        No current facility-administered medications for this visit. Allergies: Patient has no known allergies. Past Medical History:   Diagnosis Date    Abnormal angiogram 10/04/2016    carotid - mod stenosis on right    Arthritis     Left shoulder, left foot    Bulging of lumbar intervertebral disc     x3    CAD (coronary artery disease)     Dr. Ary Carrillo [facility]     Type II - Follows with PCP    Full dentures     upper and lower    H/O chest pain 10/2016    Last chest pain: 12/2019    H/O echocardiogram 10/23/2019    EF 45-50%, Mild MR. Hypokinesis of the Basal infero-septal segment.  Hearing aid worn     bilateral    History of nuclear stress test 10/23/2019    perfusion scan shows moderate size, severe intensity, non reversible perfusion defect in inferior wall suggesting inferior wall infarction.     History of PTCA 10/04/2016    ptca svg-diag    HTN (hypertension)     Follows with PCP    Hyperlipemia     Macular degeneration Bilateral eyes (worse in right)    [facility] infarction) [facility] , 2016    sees kacie rees    Restless leg syndrome     Takes Gabapentin    Sciatic nerve pain, left 10/2019    leg    Sleep apnea     Uses CPAP    Wears glasses      Past Surgical History:   Procedure Laterality Date    CAROTID ENDARTERECTOMY Right 2016    MI while having endarterectomy    COLONOSCOPY      Normal exam per pt - Dr. Eliud Hammonds  10/2016    10/2016, 16, ,  7 stents total from 4 caths in 2016 = 6 stents    CORONARY ARTERY BYPASS GRAFT  11/27/2006    x 3    ELBOW FRACTURE SURGERY Left     no hardware    EYE SURGERY Bilateral    [address]    from [id number] had 6 hernia surgies, unable to recall even estimates of years    SHOULDER ARTHROPLASTY Left 2020    SHOULDER TOTAL ARTHROPLASTY REVERSE performed by Tyler Do MD at [address] [place] [address]      As reviewed   Family History   Problem Relation Age of Onset    High Blood Pressure Mother     Heart Disease Father     Diabetes Brother      Social History     Tobacco Use    Smoking status: Former Smoker     Packs/day: 1.50     Years: 12.00     Pack years: 18.00     Types: Cigarettes     Start date: 1954     Last attempt to quit: 1966     Years since quittin.8    Smokeless tobacco: Former User     Types: Chew     Quit date: 1966   Substance Use Topics    Alcohol use: No     Alcohol/week: 0.0 standard drinks     Comment: CAFFEINE 1 POT OF COFFEE A DAY      Review of Systems:    Constitutional: Negative for diaphoresis and fatigue  Psychological:Negative for anxiety or depression  HENT: Negative for headaches, nasal congestion, sinus pain or vertigo  Eyes: Negative for visual disturbance.    Endocrine: Negative for polydipsia/polyuria  Respiratory: Negative for shortness of breath  Cardiovascular: Negative for chest pain, dyspnea on exertion, claudication, edema, irregular heartbeat, murmur, palpitations or shortness of breath  Gastrointestinal: Negative for abdominal pain or heartburn  Genito-Urinary: Negative for urinary frequency/urgency  Musculoskeletal: Negative for muscle pain, muscular weakness, negative for pain in arm and leg or swelling in foot and leg  Neurological: Negative for dizziness, headaches, memory loss, numbness/tingling, visual changes, syncope  Dermatological: Negative for rash    Objective:  /80   Pulse 76   Ht 6' (1.829 m)   Wt 238 lb (108 kg)   BMI 32.28 kg/m²   Wt Readings from Last 3 Encounters:   10/06/20 238 lb (108 kg)   02/01/20 241 lb (109.3 kg)   01/24/20 241 lb (109.3 kg)     Body mass index is 32.28 kg/m². No flowsheet data found. Vitals:    10/06/20 1143   BP: 128/80   Pulse: 76   Weight: 238 lb (108 kg)   Height: 6' (1.829 m)      GENERAL - Alert, oriented, pleasant, in no apparent distress. EYES: No jaundice, no conjunctival pallor. SKIN: It is warm & dry. No rashes. No Echhymosis    HEENT - No clinically significant abnormalities seen. Neck - Supple. No jugular venous distention noted. No carotid bruits. Cardiovascular - Normal S1 and S2 without obvious murmur or gallop. Extremities - No cyanosis, clubbing, or significant edema. Pulmonary - No respiratory distress. No wheezes or rales. Abdomen - No masses, tenderness, or organomegaly. Musculoskeletal - No significant edema. No joint deformities. No muscle wasting. Neurologic - Cranial nerves II through XII are grossly intact. There were no gross focal neurologic abnormalities.     Lab Review   No results found for: CKTOTAL, CKMB, CKMBINDEX, TROPONINT  BNP:  No results found for: BNP, PROBNP  PT/INR:    Lab Results   Component Value Date    INR 0.98 06/01/2018    INR 0.96 12/12/2016     No results found for: LABA1C  Lab Results   Component Value Date    WBC 13.2 (H) 02/01/2020    WBC 7.7 01/24/2020 List   Diagnosis Code    H/O cardiovascular stress test Z92.89    CAD (coronary artery disease) I25.10    SOB (shortness of breath) R06.02    Hyperlipemia E78.5    S/P CABG x 3 Z95.1    HTN (hypertension) I10    S/P angioplasty Z98.62    [facility] infarction[facility] I21.9    Abnormal nuclear stress test R94.39    Chest pain R07.9    Unstable angina ([facility]) I20.0    Angina at rest [facility]) I20.8    Abnormal carotid ultrasound R93.89    Bilateral carotid artery disease ([facility]) I77.9    Stenosis of right carotid artery I65.21    History of left shoulder replacement Z96.612       Assessment & Plan:    CAD:Yes. Patient's IMI finding on Cardiolite is old. clinically stable. Patient is on optimal medical regimen ( see medication list above )  -     CORONARY ARTERY DISEASE: Patient is currently  mildly symptomatic from CAD. - changes in  treatment:   no           - Testing ordered:  no  [facility] classification: 1  [place] RISK SCORE:  GIDEON RISK SCORE:  HTN:well controlled on current medical regimen, see list above. - changes in  treatment:   no   CARDIOMYOPATHY: None known   CONGESTIVE HEART FAILURE: NO KNOWN HISTORY.     VHD: No significant VHD noted  DYSLIPIDEMIA: Patient's profile is at / near Goal.yes,                                 HDL is low                                Tolerating current medical regimen wellyes,                                                               See most recent Lab values in Labs section above. CAROTID ARTERY DISEASE:.yes,                No symptoms described pertaining to Carotid artery disease               Up to date on non invasive testing .yes,                Patient is on Guidelines recommended therapy. yes,   OTHER RELEVANT DIAGNOSIS:as noted in patient's active problem list:  TESTS ORDERED: None this visit                                            All previously ordered tests reviewed.    ARRHYTHMIAS: None known   MEDICATIONS: CPM

## 2020-10-06 NOTE — PATIENT INSTRUCTIONS
CAD:Yes. Patient's IMI finding on Cardiolite is old. clinically stable. Patient is on optimal medical regimen ( see medication list above )  -     CORONARY ARTERY DISEASE: Patient is currently  mildly symptomatic from CAD. - changes in  treatment:   no           - Testing ordered:  no  [facility] classification: 1  [place] RISK SCORE:  GIDEON RISK SCORE:  HTN:well controlled on current medical regimen, see list above. - changes in  treatment:   no   CARDIOMYOPATHY: None known   CONGESTIVE HEART FAILURE: NO KNOWN HISTORY.     VHD: No significant VHD noted  DYSLIPIDEMIA: Patient's profile is at / near Goal.yes,                                 HDL is low                                Tolerating current medical regimen wellyes,                                                               See most recent Lab values in Labs section above. CAROTID ARTERY DISEASE:.yes,                No symptoms described pertaining to Carotid artery disease               Up to date on non invasive testing .yes,                Patient is on Guidelines recommended therapy. yes,   OTHER RELEVANT DIAGNOSIS:as noted in patient's active problem list:  TESTS ORDERED: None this visit                                            All previously ordered tests reviewed. ARRHYTHMIAS: None known   MEDICATIONS: CPM   Office f/u in six months. Primary/secondary prevention is the goal by aggressive risk modification, healthy and therapeutic life style changes for cardiovascular risk reduction.  Various goals are discussed and multiple questions answered.

## 2020-11-02 ENCOUNTER — TELEPHONE (OUTPATIENT)
Dept: CARDIOLOGY CLINIC | Age: 80
End: 2020-11-02

## 2020-11-30 ENCOUNTER — ANESTHESIA EVENT (OUTPATIENT)
Dept: OPERATING ROOM | Age: 80
End: 2020-11-30
Payer: MEDICARE

## 2020-12-02 ASSESSMENT — ENCOUNTER SYMPTOMS: SHORTNESS OF BREATH: 1

## 2020-12-02 NOTE — ANESTHESIA PRE PROCEDURE
Department of Anesthesiology  Preprocedure Note       Name:  Joann Bird [id number]   Age:  79 y.o.  :  1940                                          MRN:  [id number]         Date:  2020      Surgeon: Mary Akers):  Edwin Gallagher MD    Procedure: LEFT KNEE TOTAL ARTHROPLASTY (Left )    Medications prior to admission:   Prior to Admission medications    Medication Sig Start Date End Date Taking? Authorizing Provider   alirocumab (PRALUENT) 75 MG/ML SOAJ injection pen Inject 1 mL into the skin every 14 days 20   Santi Ziegler MD   PRALUENT 75 MG/ML SOPN injection pen INJECT 1ML INTO THE SKIN EVERY 14 DAYS 3/22/19   Santi Ziegler MD   BRILINTA 90 MG TABS tablet TAKE 1 TABLET TWICE A DAY 3/1/18   Sanit Ziegler MD   PRALUENT 75 MG/ML SOPN injection pen Inject 1 mL into the skin every 14 days 2/3/17 4/1/21  Santi Ziegler MD   nitroGLYCERIN (NITROSTAT) 0.4 MG SL tablet Place 1 tablet under the tongue every 5 minutes as needed for Chest pain 16   Santi Ziegler MD   atorvastatin (LIPITOR) 40 MG tablet Take 1 tablet by mouth daily  Patient taking differently: Take 40 mg by mouth every morning  16   Santi Ziegler MD   naproxen (NAPROSYN) 500 MG tablet Take 500 mg by mouth 2 times daily (with meals)    Historical Provider, MD   gabapentin (NEURONTIN) 100 MG capsule Take 100 mg by mouth 3 times daily    Historical Provider, MD   traMADol (ULTRAM) 50 MG tablet Take 50 mg by mouth every 6 hours as needed for Pain    Historical Provider, MD   famotidine (PEPCID) 20 MG tablet Take 20 mg by mouth 2 times daily    Historical Provider, MD   multivitamin (OCUVITE) TABS per tablet Take 1 tablet by mouth daily. Historical Provider, MD   losartan (COZAAR) 25 MG tablet Take 12.5 mg by mouth every morning 0.5 tablet daily     Historical Provider, MD   carvedilol (COREG) 6.25 MG tablet Take 6.25 mg by mouth 2 times daily (with meals).     Historical Provider, MD   Omega-3 Fatty Acids (FISH OIL) 1000 MG CAPS Take 1,000 mg by mouth daily     Historical Provider, MD   aspirin 81 MG chewable tablet Take 81 mg by mouth nightly     Historical Provider, MD       Current medications:    Current Outpatient Medications   Medication Sig Dispense Refill    alirocumab (PRALUENT) 75 MG/ML SOAJ injection pen Inject 1 mL into the skin every 14 days 6 pen 3    PRALUENT 75 MG/ML SOPN injection pen INJECT 1ML INTO THE SKIN EVERY 14 DAYS 6 pen 3    BRILINTA 90 MG TABS tablet TAKE 1 TABLET TWICE A DAY 90 tablet 3    PRALUENT 75 MG/ML SOPN injection pen Inject 1 mL into the skin every 14 days 6 Pen 2    nitroGLYCERIN (NITROSTAT) 0.4 MG SL tablet Place 1 tablet under the tongue every 5 minutes as needed for Chest pain 25 tablet 2    atorvastatin (LIPITOR) 40 MG tablet Take 1 tablet by mouth daily (Patient taking differently: Take 40 mg by mouth every morning ) 90 tablet 0    naproxen (NAPROSYN) 500 MG tablet Take 500 mg by mouth 2 times daily (with meals)      gabapentin (NEURONTIN) 100 MG capsule Take 100 mg by mouth 3 times daily      traMADol (ULTRAM) 50 MG tablet Take 50 mg by mouth every 6 hours as needed for Pain      famotidine (PEPCID) 20 MG tablet Take 20 mg by mouth 2 times daily      multivitamin (OCUVITE) TABS per tablet Take 1 tablet by mouth daily.  losartan (COZAAR) 25 MG tablet Take 12.5 mg by mouth every morning 0.5 tablet daily       carvedilol (COREG) 6.25 MG tablet Take 6.25 mg by mouth 2 times daily (with meals).  Omega-3 Fatty Acids (FISH OIL) 1000 MG CAPS Take 1,000 mg by mouth daily       aspirin 81 MG chewable tablet Take 81 mg by mouth nightly        No current facility-administered medications for this visit.         Allergies:  No Known Allergies    Problem List:    Patient Active Problem List   Diagnosis Code    H/O cardiovascular stress test Z92.89    CAD (coronary artery disease) I25.10    SOB (shortness of breath) R06.02    Hyperlipemia E78.5    S/P CABG x 3 Z95.1    HTN (hypertension) I10    S/P angioplasty Z80.62    [facility] infarction) ([facility] I21.9    Abnormal nuclear stress test R94.39    Chest pain R07.9    Unstable angina ([facility]) I20.0    Angina at rest [facility]) I20.8    Abnormal carotid ultrasound R93.89    Bilateral carotid artery disease (HCC) I77.9    Stenosis of right carotid artery I65.21    History of left shoulder replacement Z96.612       Past Medical History:        Diagnosis Date    Abnormal angiogram 10/04/2016    carotid - mod stenosis on right    Arthritis     Left shoulder, left foot    Bulging of lumbar intervertebral disc     x3    CAD (coronary artery disease)     Dr. Leila Herrera Diabetes mellitus ([facility]     Type II - Follows with PCP    Full dentures     upper and lower    H/O chest pain 10/2016    Last chest pain: 12/2019    H/O echocardiogram 10/23/2019    EF 45-50%, Mild MR. Hypokinesis of the Basal infero-septal segment.  Hearing aid worn     bilateral    History of nuclear stress test 10/23/2019    perfusion scan shows moderate size, severe intensity, non reversible perfusion defect in inferior wall suggesting inferior wall infarction.     History of PTCA 10/04/2016    ptca svg-diag    HTN (hypertension)     Follows with PCP    Hyperlipemia     Macular degeneration     Bilateral eyes (worse in right)    MI (myocardial infarction) ([facility] 2006, nov 2016    sees kacie rees    Restless leg syndrome     Takes Gabapentin    Sciatic nerve pain, left 10/2019    leg    Sleep apnea     Uses CPAP    Wears glasses        Past Surgical History:        Procedure Laterality Date    CAROTID ENDARTERECTOMY Right 12/14/2016    MI while having endarterectomy    COLONOSCOPY  2007    Normal exam per pt - Dr. John Aldana  10/2016    10/2016, 2/12/16, 5/16, 8/16 7 stents total from 4 caths in 2016 = 6 stents    CORONARY ARTERY BYPASS GRAFT  11/27/2006    x 3    ELBOW FRACTURE SURGERY Left 1996    no hardware    EYE SURGERY Bilateral    [address]  [facility] HERNIA REPAIR  1991    from [id number] had 6 hernia surgies, unable to recall even estimates of years    SHOULDER ARTHROPLASTY Left 2020    SHOULDER TOTAL ARTHROPLASTY REVERSE performed by Lillian Chun MD at [place] [address]  [address]       Social History:    Social History     Tobacco Use    Smoking status: Former Smoker     Packs/day: 1.50     Years: 12.00     Pack years: 18.00     Types: Cigarettes     Start date: 1954     Last attempt to quit: 1966     Years since quittin.0    Smokeless tobacco: Former User     Types: Chew     Quit date: 1966   Substance Use Topics    Alcohol use: No     Alcohol/week: 0.0 standard drinks     Comment: CAFFEINE 1 POT OF COFFEE A DAY                                Counseling given: Not Answered      Vital Signs (Current): There were no vitals filed for this visit. BP Readings from Last 3 Encounters:   10/06/20 128/80   20 (!) 121/55   20 (!) 137/59       NPO Status:                                                                                 BMI:   Wt Readings from Last 3 Encounters:   10/06/20 238 lb (108 kg)   20 241 lb (109.3 kg)   20 241 lb (109.3 kg)     There is no height or weight on file to calculate BMI.       CBC  Lab Results   Component Value Date/Time    WBC 5.1 2020 10:45 AM    HGB 14.8 2020 10:45 AM    HCT 42.8 2020 10:45 AM     2020 10:45 AM     RENAL  Lab Results   Component Value Date/Time     2020 10:45 AM    K 4.1 2020 10:45 AM     2020 10:45 AM    CO2 27 2020 10:45 AM    BUN 18 2020 10:45 AM    CREATININE 1.0 2020 10:45 AM    GLUCOSE 170 (H) 2020 10:45 AM     Anesthesia Evaluation  Patient summary reviewed  Airway: Mallampati: IV  TM distance: >3 FB   Neck ROM: full  Mouth opening: > = 3 FB Dental: (+) edentulous      Pulmonary: breath sounds clear to auscultation  (+) shortness of breath:  sleep apnea (instructed to bring machine DOS): on CPAP,            Patient did not smoke on day of surgery. ROS comment:   remote smoking hx, quit 1966    PAT Airway Exam:  Head/Neck movement: full  Thyromental Distance: > 3 FB  History of difficult intubation:  None  Mallampati Classification:  Class II  Teeth: upper and lower dentures. Able to lie flat       CXR:  12/3/2020  Non acute    Cardiovascular:  Exercise tolerance: good (>4 METS),   (+) hypertension (on beta blocker and ASA):, angina:, past MI (NSTEMI 2006, 2016): > 6 months, CAD:, CABG/stent (CABG x 3, 2006. PTCA with stent to OM, 3/2015. 2/2016 stent to OM, CX. 10/2016 stent to diag. On brilinta, last dose 12/4/2020):, hyperlipidemia      ECG reviewed  Rhythm: regular    Echocardiogram reviewed  Stress test reviewed  Cleared by cardiology     Beta Blocker:  Dose within 24 Hrs      ROS comment: Cardiac  risk assessment per RAJI Mims  Considered MODERATE risk candidate for any jessie-operative cardiac complications. It is my recommendation that the procedure be done at the hospital.      Stress 10/2019  normal LVEF,  Myocardial  perfusion scan shows moderate size, severe intensity, non reversible  perfusion defect in inferior wall suggesting inferior wall infarction.       Echo 10/2019  EF 45-50%  Left Ventricular size is Normal .   Hypokinesis of the Basal infero-septal segment. Normal left ventricular wall thickness. Normal diastolic filling pattern for age. Mildly dilated left atrium. Mildly enlarged right atrium size. Mitral annular calcification is present. Mild mitral regurgitation is present. RVSP= 26 mmHg. No evidence of pericardial effusion.       Cardiac cath 2018   1. Severe two vessel CAD of LAD & Left Circumflex with occlusion of OM2   2. kissing Stents for Left Circumflex / OM 1 widely patent.   3. SVBG to OM2 known to be occluded. OM 2 gets collaterals from [facility]. SVBG to Diagonal Patent, stent patent.   5. BURK to LAD is patent.   6. LV: Regional WMA with low normal LV systolic function.       EK2020  Sinus rhythm with 1st degree AV block   Inferior infarct (cited on or before 05-OCT-2016)   Abnormal ECG   When compared with ECG of 2018 16:37,   No significant change was found     CARDIOVASCULAR:  Normal apical impulse, regular rate and rhythm, normal S1 and S2, no S3 or S4, and no murmur noted    CP: NO  Exercise: active     EK/3/2020  Sinus rhythm with 1st degree AV block   Minimal voltage criteria for LVH, may be normal variant   Inferior infarct (cited on or before 05-OCT-2016)   Abnormal ECG   When compared with ECG of 2020 10:29,   No significant change was found     Neuro/Psych:   (+) neuromuscular disease (RLS, leg pain, on neurontin ):, psychiatric history:depression/anxiety              ROS comment: HEENT: dry Macular degeneration, very low vision R>L. [place], dottie hearing aids GI/Hepatic/Renal:   (+) GERD: well controlled,           Endo/Other:    (+) Diabetes (recently started on metformin. No A1c for review )Type II DM, , : arthritis ([place], dottie shoulders s/p Left shoulder reverse arthroplasty 2020): OA., . Pt had PAT visit. ROS comment: S/p multiple hernia repairs from [id number]. Fx elbow, , no hardware.    Abdominal:   (+) obese,         Vascular:   + PVD, aortic or cerebral (s/p right CEA, 2016.  ), . ROS comment: Mini-cog evaluation performed per anesthesia protocol,    > age 73. Scored: 3/5  Copy on chart for review. .                       Anesthesia Plan      general, regional, spinal and TIVA     ASA 4       Induction: intravenous. Anesthetic plan and risks discussed with patient. Plan discussed with CRNA.     Attending anesthesiologist reviewed and agrees with Pre Eval content             Kaelyn Doran, APRN - CNP Chart

## 2020-12-03 ENCOUNTER — HOSPITAL ENCOUNTER (OUTPATIENT)
Dept: GENERAL RADIOLOGY | Age: 80
Discharge: HOME | End: 2020-12-03
Payer: MEDICARE

## 2020-12-03 ENCOUNTER — HOSPITAL ENCOUNTER (OUTPATIENT)
Dept: PREADMISSION TESTING | Age: 80
Discharge: HOME | End: 2020-12-07
Payer: MEDICARE

## 2020-12-03 VITALS
HEIGHT: 69 IN | TEMPERATURE: 97.4 F | WEIGHT: 239 LBS | RESPIRATION RATE: 20 BRPM | OXYGEN SATURATION: 97 % | HEART RATE: 62 BPM | BODY MASS INDEX: 35.4 KG/M2 | SYSTOLIC BLOOD PRESSURE: 141 MMHG | DIASTOLIC BLOOD PRESSURE: 71 MMHG

## 2020-12-03 LAB
ABO/RH: NORMAL
ALBUMIN SERPL-MCNC: 4.2 GM/DL (ref 3.4–5)
ALP BLD-CCNC: 115 IU/L (ref 40–128)
ALT SERPL-CCNC: 8 U/L (ref 10–40)
ANION GAP SERPL CALCULATED.3IONS-SCNC: 9 MMOL/L (ref 4–16)
ANTIBODY SCREEN: NEGATIVE
AST SERPL-CCNC: 18 IU/L (ref 15–37)
BASOPHILS ABSOLUTE: 0 K/CU MM
BASOPHILS RELATIVE PERCENT: 0.6 % (ref 0–1)
BILIRUB SERPL-MCNC: 0.5 MG/DL (ref 0–1)
BUN BLDV-MCNC: 18 MG/DL (ref 6–23)
CALCIUM SERPL-MCNC: 8.7 MG/DL (ref 8.3–10.6)
CHLORIDE BLD-SCNC: 102 MMOL/L (ref 99–110)
CO2: 27 MMOL/L (ref 21–32)
COMMENT: NORMAL
CREAT SERPL-MCNC: 1 MG/DL (ref 0.9–1.3)
DIFFERENTIAL TYPE: ABNORMAL
EKG ATRIAL RATE: 68 BPM
EKG DIAGNOSIS: NORMAL
EKG P AXIS: 83 DEGREES
EKG P-R INTERVAL: 292 MS
EKG Q-T INTERVAL: 400 MS
EKG QRS DURATION: 96 MS
EKG QTC CALCULATION (BAZETT): 425 MS
EKG R AXIS: -11 DEGREES
EKG T AXIS: -21 DEGREES
EKG VENTRICULAR RATE: 68 BPM
EOSINOPHILS ABSOLUTE: 0.1 K/CU MM
EOSINOPHILS RELATIVE PERCENT: 1.6 % (ref 0–3)
GFR AFRICAN AMERICAN: >60 ML/MIN/1.73M2
GFR NON-AFRICAN AMERICAN: >60 ML/MIN/1.73M2
GLUCOSE BLD-MCNC: 170 MG/DL (ref 70–99)
HCT VFR BLD CALC: 42.8 % (ref 42–52)
HEMOGLOBIN: 14.8 GM/DL (ref 13.5–18)
IMMATURE NEUTROPHIL %: 0.4 % (ref 0–0.43)
LYMPHOCYTES ABSOLUTE: 0.9 K/CU MM
LYMPHOCYTES RELATIVE PERCENT: 17.9 % (ref 24–44)
MCH RBC QN AUTO: 30.7 PG (ref 27–31)
MCHC RBC AUTO-ENTMCNC: 34.6 % (ref 32–36)
MCV RBC AUTO: 88.8 FL (ref 78–100)
MONOCYTES ABSOLUTE: 0.7 K/CU MM
MONOCYTES RELATIVE PERCENT: 13 % (ref 0–4)
NUCLEATED RBC %: 0 %
PDW BLD-RTO: 12.8 % (ref 11.7–14.9)
PLATELET # BLD: 147 K/CU MM (ref 140–440)
PMV BLD AUTO: 9.8 FL (ref 7.5–11.1)
POTASSIUM SERPL-SCNC: 4.1 MMOL/L (ref 3.5–5.1)
RBC # BLD: 4.82 M/CU MM (ref 4.6–6.2)
SEGMENTED NEUTROPHILS ABSOLUTE COUNT: 3.4 K/CU MM
SEGMENTED NEUTROPHILS RELATIVE PERCENT: 66.5 % (ref 36–66)
SODIUM BLD-SCNC: 138 MMOL/L (ref 135–145)
TOTAL IMMATURE NEUTOROPHIL: 0.02 K/CU MM
TOTAL NUCLEATED RBC: 0 K/CU MM
TOTAL PROTEIN: 6.2 GM/DL (ref 6.4–8.2)
WBC # BLD: 5.1 K/CU MM (ref 4–10.5)

## 2020-12-03 PROCEDURE — 87086 URINE CULTURE/COLONY COUNT: CPT

## 2020-12-03 PROCEDURE — 80053 COMPREHEN METABOLIC PANEL: CPT

## 2020-12-03 PROCEDURE — 85652 RBC SED RATE AUTOMATED: CPT

## 2020-12-03 PROCEDURE — 93005 ELECTROCARDIOGRAM TRACING: CPT | Performed by: ORTHOPAEDIC SURGERY

## 2020-12-03 PROCEDURE — 36415 COLL VENOUS BLD VENIPUNCTURE: CPT

## 2020-12-03 PROCEDURE — 86850 RBC ANTIBODY SCREEN: CPT

## 2020-12-03 PROCEDURE — 71046 X-RAY EXAM CHEST 2 VIEWS: CPT

## 2020-12-03 PROCEDURE — 86900 BLOOD TYPING SEROLOGIC ABO: CPT

## 2020-12-03 PROCEDURE — 93010 ELECTROCARDIOGRAM REPORT: CPT | Performed by: INTERNAL MEDICINE

## 2020-12-03 PROCEDURE — U0002 COVID-19 LAB TEST NON-CDC: HCPCS

## 2020-12-03 PROCEDURE — 86901 BLOOD TYPING SEROLOGIC RH(D): CPT

## 2020-12-03 PROCEDURE — C9803 HOPD COVID-19 SPEC COLLECT: HCPCS

## 2020-12-03 PROCEDURE — 85025 COMPLETE CBC W/AUTO DIFF WBC: CPT

## 2020-12-03 RX ORDER — ROPINIROLE 2 MG/1
1 TABLET, FILM COATED, EXTENDED RELEASE ORAL NIGHTLY
COMMUNITY

## 2020-12-03 RX ORDER — MAGNESIUM OXIDE 400 MG/1
250 TABLET ORAL 2 TIMES DAILY
COMMUNITY
End: 2022-02-24

## 2020-12-03 RX ORDER — SODIUM CHLORIDE, SODIUM LACTATE, POTASSIUM CHLORIDE, CALCIUM CHLORIDE 600; 310; 30; 20 MG/100ML; MG/100ML; MG/100ML; MG/100ML
INJECTION, SOLUTION INTRAVENOUS ONCE
Status: CANCELLED | OUTPATIENT
Start: 2020-12-10

## 2020-12-03 ASSESSMENT — PAIN DESCRIPTION - ONSET: ONSET: ON-GOING

## 2020-12-03 ASSESSMENT — PAIN DESCRIPTION - PROGRESSION: CLINICAL_PROGRESSION: GRADUALLY WORSENING

## 2020-12-03 ASSESSMENT — PAIN DESCRIPTION - PAIN TYPE: TYPE: CHRONIC PAIN

## 2020-12-03 ASSESSMENT — PAIN DESCRIPTION - LOCATION: LOCATION: KNEE

## 2020-12-03 ASSESSMENT — PAIN SCALES - GENERAL: PAINLEVEL_OUTOF10: 9

## 2020-12-03 ASSESSMENT — PAIN DESCRIPTION - FREQUENCY: FREQUENCY: INTERMITTENT

## 2020-12-03 ASSESSMENT — PAIN DESCRIPTION - ORIENTATION: ORIENTATION: LEFT

## 2020-12-03 NOTE — PROGRESS NOTES
Called Dr Senia Fraser office = pt unsure when he is to stop his blood thinner-per staff he is to stop this 5 days prior to surgery so the last pill should be taken on 12/4/2020 for surgery 12/10/2020- pt informed

## 2020-12-04 LAB
CULTURE: NORMAL
Lab: NORMAL
SARS-COV-2: NOT DETECTED
SOURCE: NORMAL
SPECIMEN: NORMAL

## 2020-12-10 ENCOUNTER — HOSPITAL ENCOUNTER (OUTPATIENT)
Age: 80
Setting detail: OBSERVATION
Discharge: HOME HEALTH | End: 2020-12-14
Attending: ORTHOPAEDIC SURGERY | Admitting: ORTHOPAEDIC SURGERY
Payer: MEDICARE

## 2020-12-10 ENCOUNTER — ANESTHESIA (OUTPATIENT)
Dept: OPERATING ROOM | Age: 80
End: 2020-12-10
Payer: MEDICARE

## 2020-12-10 ENCOUNTER — APPOINTMENT (OUTPATIENT)
Dept: GENERAL RADIOLOGY | Age: 80
End: 2020-12-10
Attending: ORTHOPAEDIC SURGERY
Payer: MEDICARE

## 2020-12-10 VITALS
SYSTOLIC BLOOD PRESSURE: 124 MMHG | RESPIRATION RATE: 18 BRPM | OXYGEN SATURATION: 95 % | DIASTOLIC BLOOD PRESSURE: 76 MMHG

## 2020-12-10 PROBLEM — M17.12 ARTHRITIS OF KNEE, LEFT: Status: ACTIVE | Noted: 2020-12-10

## 2020-12-10 PROBLEM — Z96.652 TOTAL KNEE REPLACEMENT STATUS, LEFT: Status: ACTIVE | Noted: 2020-12-10

## 2020-12-10 LAB
GLUCOSE BLD-MCNC: 132 MG/DL (ref 70–99)
GLUCOSE BLD-MCNC: 156 MG/DL (ref 70–99)
GLUCOSE BLD-MCNC: 157 MG/DL (ref 70–99)
GLUCOSE BLD-MCNC: 168 MG/DL (ref 70–99)
GLUCOSE BLD-MCNC: 174 MG/DL (ref 70–99)

## 2020-12-10 PROCEDURE — 6360000002 HC RX W HCPCS: Performed by: ORTHOPAEDIC SURGERY

## 2020-12-10 PROCEDURE — 73560 X-RAY EXAM OF KNEE 1 OR 2: CPT

## 2020-12-10 PROCEDURE — 3600000005 HC SURGERY LEVEL 5 BASE: Performed by: ORTHOPAEDIC SURGERY

## 2020-12-10 PROCEDURE — 6360000002 HC RX W HCPCS: Performed by: NURSE ANESTHETIST, CERTIFIED REGISTERED

## 2020-12-10 PROCEDURE — 2580000003 HC RX 258: Performed by: ORTHOPAEDIC SURGERY

## 2020-12-10 PROCEDURE — C1776 JOINT DEVICE (IMPLANTABLE): HCPCS | Performed by: ORTHOPAEDIC SURGERY

## 2020-12-10 PROCEDURE — 7100000000 HC PACU RECOVERY - FIRST 15 MIN: Performed by: ORTHOPAEDIC SURGERY

## 2020-12-10 PROCEDURE — 3600000015 HC SURGERY LEVEL 5 ADDTL 15MIN: Performed by: ORTHOPAEDIC SURGERY

## 2020-12-10 PROCEDURE — C1713 ANCHOR/SCREW BN/BN,TIS/BN: HCPCS | Performed by: ORTHOPAEDIC SURGERY

## 2020-12-10 PROCEDURE — 94664 DEMO&/EVAL PT USE INHALER: CPT

## 2020-12-10 PROCEDURE — 2709999900 HC NON-CHARGEABLE SUPPLY: Performed by: ORTHOPAEDIC SURGERY

## 2020-12-10 PROCEDURE — G0378 HOSPITAL OBSERVATION PER HR: HCPCS

## 2020-12-10 PROCEDURE — 7100000001 HC PACU RECOVERY - ADDTL 15 MIN: Performed by: ORTHOPAEDIC SURGERY

## 2020-12-10 PROCEDURE — 2500000003 HC RX 250 WO HCPCS: Performed by: NURSE ANESTHETIST, CERTIFIED REGISTERED

## 2020-12-10 PROCEDURE — 6370000000 HC RX 637 (ALT 250 FOR IP): Performed by: ORTHOPAEDIC SURGERY

## 2020-12-10 PROCEDURE — 2580000003 HC RX 258: Performed by: ANESTHESIOLOGY

## 2020-12-10 PROCEDURE — 64447 NJX AA&/STRD FEMORAL NRV IMG: CPT | Performed by: NURSE ANESTHETIST, CERTIFIED REGISTERED

## 2020-12-10 PROCEDURE — 82962 GLUCOSE BLOOD TEST: CPT

## 2020-12-10 PROCEDURE — 2780000010 HC IMPLANT OTHER: Performed by: ORTHOPAEDIC SURGERY

## 2020-12-10 PROCEDURE — 3700000001 HC ADD 15 MINUTES (ANESTHESIA): Performed by: ORTHOPAEDIC SURGERY

## 2020-12-10 PROCEDURE — 94150 VITAL CAPACITY TEST: CPT

## 2020-12-10 PROCEDURE — 3700000000 HC ANESTHESIA ATTENDED CARE: Performed by: ORTHOPAEDIC SURGERY

## 2020-12-10 PROCEDURE — 2500000003 HC RX 250 WO HCPCS: Performed by: ORTHOPAEDIC SURGERY

## 2020-12-10 DEVICE — ADVANCE® ONLAY ALL-POLY PATELLA 32MM TRI-PEG
Type: IMPLANTABLE DEVICE | Site: KNEE | Status: FUNCTIONAL
Brand: ADVANCE®

## 2020-12-10 DEVICE — EVOLUTION® MP TIB KEELED NONPOR SIZE 6+ LEFT
Type: IMPLANTABLE DEVICE | Site: KNEE | Status: FUNCTIONAL
Brand: EVOLUTION

## 2020-12-10 DEVICE — EVOLUTION®MP FEM CS/CR NON-POR SIZE 6 PRIMARY LEFT
Type: IMPLANTABLE DEVICE | Site: KNEE | Status: FUNCTIONAL
Brand: EVOLUTION

## 2020-12-10 DEVICE — EVOLUTION® MP™ CS INSERT SIZE 6 STANDARD 14MM LEFT
Type: IMPLANTABLE DEVICE | Site: KNEE | Status: FUNCTIONAL
Brand: EVOLUTION

## 2020-12-10 DEVICE — CEMENT BNE 40GM HI VISC RADPQ FOR REV SURG: Type: IMPLANTABLE DEVICE | Site: KNEE | Status: FUNCTIONAL

## 2020-12-10 RX ORDER — LABETALOL HYDROCHLORIDE 5 MG/ML
5 INJECTION, SOLUTION INTRAVENOUS EVERY 10 MIN PRN
Status: DISCONTINUED | OUTPATIENT
Start: 2020-12-10 | End: 2020-12-10 | Stop reason: HOSPADM

## 2020-12-10 RX ORDER — HYDRALAZINE HYDROCHLORIDE 20 MG/ML
5 INJECTION INTRAMUSCULAR; INTRAVENOUS EVERY 10 MIN PRN
Status: DISCONTINUED | OUTPATIENT
Start: 2020-12-10 | End: 2020-12-10 | Stop reason: HOSPADM

## 2020-12-10 RX ORDER — FENTANYL CITRATE 50 UG/ML
25 INJECTION, SOLUTION INTRAMUSCULAR; INTRAVENOUS EVERY 5 MIN PRN
Status: DISCONTINUED | OUTPATIENT
Start: 2020-12-10 | End: 2020-12-10 | Stop reason: HOSPADM

## 2020-12-10 RX ORDER — TOBRAMYCIN 1.2 G/30ML
INJECTION, POWDER, LYOPHILIZED, FOR SOLUTION INTRAVENOUS
Status: COMPLETED | OUTPATIENT
Start: 2020-12-10 | End: 2020-12-10

## 2020-12-10 RX ORDER — PROMETHAZINE HYDROCHLORIDE 25 MG/1
12.5 TABLET ORAL EVERY 6 HOURS PRN
Status: DISCONTINUED | OUTPATIENT
Start: 2020-12-10 | End: 2020-12-14 | Stop reason: HOSPADM

## 2020-12-10 RX ORDER — SODIUM CHLORIDE, SODIUM LACTATE, POTASSIUM CHLORIDE, CALCIUM CHLORIDE 600; 310; 30; 20 MG/100ML; MG/100ML; MG/100ML; MG/100ML
INJECTION, SOLUTION INTRAVENOUS ONCE
Status: DISCONTINUED | OUTPATIENT
Start: 2020-12-10 | End: 2020-12-10 | Stop reason: HOSPADM

## 2020-12-10 RX ORDER — FENTANYL CITRATE 50 UG/ML
50 INJECTION, SOLUTION INTRAMUSCULAR; INTRAVENOUS EVERY 5 MIN PRN
Status: DISCONTINUED | OUTPATIENT
Start: 2020-12-10 | End: 2020-12-10 | Stop reason: HOSPADM

## 2020-12-10 RX ORDER — ROPIVACAINE HYDROCHLORIDE 2 MG/ML
INJECTION, SOLUTION EPIDURAL; INFILTRATION; PERINEURAL PRN
Status: DISCONTINUED | OUTPATIENT
Start: 2020-12-10 | End: 2020-12-10 | Stop reason: SDUPTHER

## 2020-12-10 RX ORDER — ACETAMINOPHEN 325 MG/1
650 TABLET ORAL EVERY 6 HOURS
Status: DISCONTINUED | OUTPATIENT
Start: 2020-12-10 | End: 2020-12-14 | Stop reason: HOSPADM

## 2020-12-10 RX ORDER — SENNA AND DOCUSATE SODIUM 50; 8.6 MG/1; MG/1
1 TABLET, FILM COATED ORAL 2 TIMES DAILY
Status: DISCONTINUED | OUTPATIENT
Start: 2020-12-10 | End: 2020-12-14 | Stop reason: HOSPADM

## 2020-12-10 RX ORDER — ONDANSETRON 2 MG/ML
4 INJECTION INTRAMUSCULAR; INTRAVENOUS
Status: DISCONTINUED | OUTPATIENT
Start: 2020-12-10 | End: 2020-12-10 | Stop reason: HOSPADM

## 2020-12-10 RX ORDER — CARVEDILOL 6.25 MG/1
6.25 TABLET ORAL 2 TIMES DAILY WITH MEALS
Status: DISCONTINUED | OUTPATIENT
Start: 2020-12-10 | End: 2020-12-14 | Stop reason: HOSPADM

## 2020-12-10 RX ORDER — PROPOFOL 10 MG/ML
INJECTION, EMULSION INTRAVENOUS PRN
Status: DISCONTINUED | OUTPATIENT
Start: 2020-12-10 | End: 2020-12-10 | Stop reason: SDUPTHER

## 2020-12-10 RX ORDER — SODIUM CHLORIDE 0.9 % (FLUSH) 0.9 %
10 SYRINGE (ML) INJECTION EVERY 12 HOURS SCHEDULED
Status: DISCONTINUED | OUTPATIENT
Start: 2020-12-10 | End: 2020-12-14 | Stop reason: HOSPADM

## 2020-12-10 RX ORDER — TOBRAMYCIN 1.2 G/30ML
2.4 INJECTION, POWDER, LYOPHILIZED, FOR SOLUTION INTRAVENOUS ONCE
Status: DISCONTINUED | OUTPATIENT
Start: 2020-12-10 | End: 2020-12-14 | Stop reason: HOSPADM

## 2020-12-10 RX ORDER — ONDANSETRON 2 MG/ML
INJECTION INTRAMUSCULAR; INTRAVENOUS PRN
Status: DISCONTINUED | OUTPATIENT
Start: 2020-12-10 | End: 2020-12-10 | Stop reason: SDUPTHER

## 2020-12-10 RX ORDER — FAMOTIDINE 20 MG/1
20 TABLET, FILM COATED ORAL 2 TIMES DAILY
Status: DISCONTINUED | OUTPATIENT
Start: 2020-12-10 | End: 2020-12-14 | Stop reason: HOSPADM

## 2020-12-10 RX ORDER — OXYCODONE HYDROCHLORIDE 5 MG/1
5 TABLET ORAL EVERY 4 HOURS PRN
Status: DISCONTINUED | OUTPATIENT
Start: 2020-12-10 | End: 2020-12-14 | Stop reason: HOSPADM

## 2020-12-10 RX ORDER — NITROGLYCERIN 0.4 MG/1
0.4 TABLET SUBLINGUAL EVERY 5 MIN PRN
Status: DISCONTINUED | OUTPATIENT
Start: 2020-12-10 | End: 2020-12-14 | Stop reason: HOSPADM

## 2020-12-10 RX ORDER — SODIUM CHLORIDE 0.9 % (FLUSH) 0.9 %
10 SYRINGE (ML) INJECTION PRN
Status: DISCONTINUED | OUTPATIENT
Start: 2020-12-10 | End: 2020-12-14 | Stop reason: HOSPADM

## 2020-12-10 RX ORDER — ROPINIROLE 1 MG/1
2 TABLET, FILM COATED ORAL NIGHTLY
Status: DISCONTINUED | OUTPATIENT
Start: 2020-12-11 | End: 2020-12-14 | Stop reason: HOSPADM

## 2020-12-10 RX ORDER — ONDANSETRON 2 MG/ML
4 INJECTION INTRAMUSCULAR; INTRAVENOUS EVERY 6 HOURS PRN
Status: DISCONTINUED | OUTPATIENT
Start: 2020-12-10 | End: 2020-12-14 | Stop reason: HOSPADM

## 2020-12-10 RX ORDER — MAGNESIUM OXIDE 400 MG/1
200 TABLET ORAL 2 TIMES DAILY
Status: DISCONTINUED | OUTPATIENT
Start: 2020-12-10 | End: 2020-12-14 | Stop reason: HOSPADM

## 2020-12-10 RX ORDER — SODIUM CHLORIDE, SODIUM LACTATE, POTASSIUM CHLORIDE, CALCIUM CHLORIDE 600; 310; 30; 20 MG/100ML; MG/100ML; MG/100ML; MG/100ML
INJECTION, SOLUTION INTRAVENOUS CONTINUOUS
Status: DISCONTINUED | OUTPATIENT
Start: 2020-12-10 | End: 2020-12-10

## 2020-12-10 RX ORDER — ATORVASTATIN CALCIUM 40 MG/1
40 TABLET, FILM COATED ORAL DAILY
Status: DISCONTINUED | OUTPATIENT
Start: 2020-12-11 | End: 2020-12-14 | Stop reason: HOSPADM

## 2020-12-10 RX ORDER — LIDOCAINE HYDROCHLORIDE 20 MG/ML
INJECTION, SOLUTION INTRAVENOUS PRN
Status: DISCONTINUED | OUTPATIENT
Start: 2020-12-10 | End: 2020-12-10 | Stop reason: SDUPTHER

## 2020-12-10 RX ORDER — SODIUM CHLORIDE, SODIUM LACTATE, POTASSIUM CHLORIDE, CALCIUM CHLORIDE 600; 310; 30; 20 MG/100ML; MG/100ML; MG/100ML; MG/100ML
INJECTION, SOLUTION INTRAVENOUS CONTINUOUS
Status: DISCONTINUED | OUTPATIENT
Start: 2020-12-10 | End: 2020-12-13

## 2020-12-10 RX ORDER — BUPIVACAINE HYDROCHLORIDE 5 MG/ML
INJECTION, SOLUTION EPIDURAL; INTRACAUDAL PRN
Status: DISCONTINUED | OUTPATIENT
Start: 2020-12-10 | End: 2020-12-10 | Stop reason: SDUPTHER

## 2020-12-10 RX ORDER — OMEGA-3-ACID ETHYL ESTERS 1 G/1
1000 CAPSULE, LIQUID FILLED ORAL DAILY
Status: DISCONTINUED | OUTPATIENT
Start: 2020-12-11 | End: 2020-12-14 | Stop reason: HOSPADM

## 2020-12-10 RX ORDER — LOSARTAN POTASSIUM 25 MG/1
12.5 TABLET ORAL EVERY MORNING
Status: DISCONTINUED | OUTPATIENT
Start: 2020-12-11 | End: 2020-12-14 | Stop reason: HOSPADM

## 2020-12-10 RX ORDER — GABAPENTIN 100 MG/1
100 CAPSULE ORAL 3 TIMES DAILY
Status: DISCONTINUED | OUTPATIENT
Start: 2020-12-10 | End: 2020-12-14 | Stop reason: HOSPADM

## 2020-12-10 RX ADMIN — FAMOTIDINE 20 MG: 20 TABLET, FILM COATED ORAL at 20:47

## 2020-12-10 RX ADMIN — ROPIVACAINE HYDROCHLORIDE 20 ML: 2 INJECTION, SOLUTION EPIDURAL; INFILTRATION at 07:40

## 2020-12-10 RX ADMIN — PROPOFOL 20 MG: 10 INJECTION, EMULSION INTRAVENOUS at 08:31

## 2020-12-10 RX ADMIN — GABAPENTIN 100 MG: 100 CAPSULE ORAL at 15:02

## 2020-12-10 RX ADMIN — PROPOFOL 20 MG: 10 INJECTION, EMULSION INTRAVENOUS at 08:17

## 2020-12-10 RX ADMIN — PROPOFOL 20 MG: 10 INJECTION, EMULSION INTRAVENOUS at 08:55

## 2020-12-10 RX ADMIN — ACETAMINOPHEN 650 MG: 325 TABLET ORAL at 17:27

## 2020-12-10 RX ADMIN — DOCUSATE SODIUM 50 MG AND SENNOSIDES 8.6 MG 1 TABLET: 8.6; 5 TABLET, FILM COATED ORAL at 20:47

## 2020-12-10 RX ADMIN — PROPOFOL 20 MG: 10 INJECTION, EMULSION INTRAVENOUS at 08:26

## 2020-12-10 RX ADMIN — SODIUM CHLORIDE, POTASSIUM CHLORIDE, SODIUM LACTATE AND CALCIUM CHLORIDE: 600; 310; 30; 20 INJECTION, SOLUTION INTRAVENOUS at 17:27

## 2020-12-10 RX ADMIN — GABAPENTIN 100 MG: 100 CAPSULE ORAL at 20:47

## 2020-12-10 RX ADMIN — PROPOFOL 20 MG: 10 INJECTION, EMULSION INTRAVENOUS at 08:07

## 2020-12-10 RX ADMIN — CEFAZOLIN SODIUM 2 G: 10 INJECTION, POWDER, FOR SOLUTION INTRAVENOUS at 17:27

## 2020-12-10 RX ADMIN — PROMETHAZINE HYDROCHLORIDE 12.5 MG: 25 TABLET ORAL at 20:47

## 2020-12-10 RX ADMIN — OXYCODONE HYDROCHLORIDE 5 MG: 5 TABLET ORAL at 12:59

## 2020-12-10 RX ADMIN — BUPIVACAINE HYDROCHLORIDE 2.5 ML: 5 INJECTION, SOLUTION EPIDURAL; INTRACAUDAL; PERINEURAL at 07:49

## 2020-12-10 RX ADMIN — PROPOFOL 20 MG: 10 INJECTION, EMULSION INTRAVENOUS at 08:14

## 2020-12-10 RX ADMIN — PROPOFOL 20 MG: 10 INJECTION, EMULSION INTRAVENOUS at 09:10

## 2020-12-10 RX ADMIN — PROPOFOL 20 MG: 10 INJECTION, EMULSION INTRAVENOUS at 08:45

## 2020-12-10 RX ADMIN — PROPOFOL 20 MG: 10 INJECTION, EMULSION INTRAVENOUS at 08:33

## 2020-12-10 RX ADMIN — PROPOFOL 20 MG: 10 INJECTION, EMULSION INTRAVENOUS at 09:03

## 2020-12-10 RX ADMIN — PROPOFOL 40 MG: 10 INJECTION, EMULSION INTRAVENOUS at 08:03

## 2020-12-10 RX ADMIN — PROPOFOL 20 MG: 10 INJECTION, EMULSION INTRAVENOUS at 08:10

## 2020-12-10 RX ADMIN — SODIUM CHLORIDE, POTASSIUM CHLORIDE, SODIUM LACTATE AND CALCIUM CHLORIDE: 600; 310; 30; 20 INJECTION, SOLUTION INTRAVENOUS at 10:28

## 2020-12-10 RX ADMIN — TICAGRELOR 60 MG: 60 TABLET ORAL at 20:57

## 2020-12-10 RX ADMIN — PROPOFOL 20 MG: 10 INJECTION, EMULSION INTRAVENOUS at 08:49

## 2020-12-10 RX ADMIN — LIDOCAINE HYDROCHLORIDE 100 MG: 20 INJECTION, SOLUTION INTRAVENOUS at 08:03

## 2020-12-10 RX ADMIN — PROPOFOL 20 MG: 10 INJECTION, EMULSION INTRAVENOUS at 08:29

## 2020-12-10 RX ADMIN — SODIUM CHLORIDE, POTASSIUM CHLORIDE, SODIUM LACTATE AND CALCIUM CHLORIDE: 600; 310; 30; 20 INJECTION, SOLUTION INTRAVENOUS at 06:29

## 2020-12-10 RX ADMIN — ONDANSETRON 4 MG: 2 INJECTION INTRAMUSCULAR; INTRAVENOUS at 07:26

## 2020-12-10 RX ADMIN — OXYCODONE HYDROCHLORIDE 5 MG: 5 TABLET ORAL at 20:49

## 2020-12-10 RX ADMIN — PROPOFOL 20 MG: 10 INJECTION, EMULSION INTRAVENOUS at 08:36

## 2020-12-10 RX ADMIN — PROPOFOL 20 MG: 10 INJECTION, EMULSION INTRAVENOUS at 08:42

## 2020-12-10 RX ADMIN — MAGNESIUM OXIDE 400 MG (241.3 MG MAGNESIUM) TABLET 200 MG: TABLET at 20:47

## 2020-12-10 RX ADMIN — ACETAMINOPHEN 650 MG: 325 TABLET ORAL at 15:02

## 2020-12-10 RX ADMIN — SODIUM CHLORIDE, POTASSIUM CHLORIDE, SODIUM LACTATE AND CALCIUM CHLORIDE: 600; 310; 30; 20 INJECTION, SOLUTION INTRAVENOUS at 09:20

## 2020-12-10 RX ADMIN — PROPOFOL 20 MG: 10 INJECTION, EMULSION INTRAVENOUS at 08:20

## 2020-12-10 RX ADMIN — CEFAZOLIN SODIUM 2 G: 10 INJECTION, POWDER, FOR SOLUTION INTRAVENOUS at 07:56

## 2020-12-10 RX ADMIN — CARVEDILOL 6.25 MG: 6.25 TABLET, FILM COATED ORAL at 17:27

## 2020-12-10 RX ADMIN — PROPOFOL 20 MG: 10 INJECTION, EMULSION INTRAVENOUS at 08:39

## 2020-12-10 RX ADMIN — PROPOFOL 20 MG: 10 INJECTION, EMULSION INTRAVENOUS at 08:23

## 2020-12-10 ASSESSMENT — PULMONARY FUNCTION TESTS
PIF_VALUE: 0
PIF_VALUE: 0
PIF_VALUE: 1
PIF_VALUE: 0
PIF_VALUE: 1
PIF_VALUE: 0
PIF_VALUE: 1
PIF_VALUE: 0
PIF_VALUE: 1
PIF_VALUE: 0
PIF_VALUE: 1
PIF_VALUE: 0
PIF_VALUE: 0
PIF_VALUE: 1
PIF_VALUE: 0
PIF_VALUE: 1
PIF_VALUE: 0
PIF_VALUE: 0
PIF_VALUE: 1
PIF_VALUE: 0
PIF_VALUE: 1
PIF_VALUE: 0
PIF_VALUE: 1
PIF_VALUE: 0
PIF_VALUE: 1
PIF_VALUE: 0
PIF_VALUE: 1
PIF_VALUE: 0
PIF_VALUE: 1
PIF_VALUE: 0
PIF_VALUE: 0
PIF_VALUE: 1
PIF_VALUE: 0
PIF_VALUE: 1
PIF_VALUE: 0
PIF_VALUE: 1
PIF_VALUE: 0
PIF_VALUE: 0
PIF_VALUE: 1
PIF_VALUE: 0
PIF_VALUE: 1
PIF_VALUE: 0
PIF_VALUE: 1
PIF_VALUE: 0
PIF_VALUE: 1

## 2020-12-10 ASSESSMENT — PAIN - FUNCTIONAL ASSESSMENT: PAIN_FUNCTIONAL_ASSESSMENT: 0-10

## 2020-12-10 ASSESSMENT — PAIN DESCRIPTION - LOCATION: LOCATION: KNEE

## 2020-12-10 ASSESSMENT — PAIN SCALES - GENERAL
PAINLEVEL_OUTOF10: 10
PAINLEVEL_OUTOF10: 6
PAINLEVEL_OUTOF10: 0
PAINLEVEL_OUTOF10: 5
PAINLEVEL_OUTOF10: 0
PAINLEVEL_OUTOF10: 0
PAINLEVEL_OUTOF10: 5
PAINLEVEL_OUTOF10: 0

## 2020-12-10 ASSESSMENT — PAIN DESCRIPTION - ORIENTATION: ORIENTATION: LEFT

## 2020-12-10 ASSESSMENT — PAIN DESCRIPTION - PAIN TYPE: TYPE: SURGICAL PAIN

## 2020-12-10 NOTE — ANESTHESIA PROCEDURE NOTES
Peripheral Block    Patient location during procedure: OR  Start time: 12/10/2020 7:35 AM  End time: 12/10/2020 7:40 AM  Staffing  Anesthesiologist: Cleve Cervantes MD  Resident/CRNA: ERENDIRA Mckeon CRNA  Performed: resident/CRNA   Preanesthetic Checklist  Completed: patient identified, site marked, surgical consent, pre-op evaluation, timeout performed, IV checked, risks and benefits discussed, monitors and equipment checked, anesthesia consent given, oxygen available and patient being monitored  Peripheral Block  Patient position: supine  Prep: ChloraPrep  Patient monitoring: cardiac monitor, continuous pulse ox, continuous capnometry, frequent blood pressure checks and IV access  Block type: Femoral  Laterality: left  Injection technique: single-shot  Procedures: ultrasound guided  Local infiltration: ropivacaine  Adductor canal  Provider prep: mask and sterile gloves  Local infiltration: ropivacaine  Needle  Needle type: combined needle/nerve stimulator   Needle gauge: 21 G  Needle length: 4inch.   Needle localization: ultrasound guidance  Assessment  Injection assessment: negative aspiration for heme, no paresthesia on injection and local visualized surrounding nerve on ultrasound  Paresthesia pain: none  Slow fractionated injection: yes  Hemodynamics: stable  Reason for block: post-op pain management

## 2020-12-10 NOTE — BRIEF OP NOTE
Brief Operative Report                                              Patient name  Melina Vogel  MRN    [id number]    Date of Procedure 12/10/2020      Pre-operative Diagnosis:   Left Knee Endstage osteoarthritis    Post-operative Diagnosis: Same    Procedure:    Left Total knee replacement    Surgeon:      Argenis Crowder MD     Assistant(s):     Mary Bucio PA-C    Anesthesia:     Spinal Anesthesia and Regional    Estimated blood loss:   Less than 100 ml    Specimens:     None taken    Complications:    None    Condition:    Stable    See dictated operative report for full details. Patient tolerated the procedure well and returned to recovery in stable condition. Postoperative orders and instructions have been provided.     Argenis Crowder MD

## 2020-12-10 NOTE — ANESTHESIA PROCEDURE NOTES
Spinal Block    Patient location during procedure: OR  Start time: 12/10/2020 7:42 AM  End time: 12/10/2020 7:49 AM  Reason for block: procedure for pain  Staffing  Anesthesiologist: Desitny Urban MD  Resident/CRNA: ERENDIRA Villalta - CRNA  Performed: resident/CRNA   Preanesthetic Checklist  Completed: patient identified, site marked, surgical consent, pre-op evaluation, timeout performed, IV checked, risks and benefits discussed, monitors and equipment checked, anesthesia consent given, oxygen available and patient being monitored  Spinal Block  Patient position: sitting  Prep: Betadine  Patient monitoring: continuous pulse ox, continuous capnometry, frequent blood pressure checks and cardiac monitor  Approach: midline  Location: L4/L5  Provider prep: mask and sterile gloves  Agent: bupivacaine  Needle  Needle type: Quincke   Needle gauge: 22 G  Needle length: 3.5 in  Assessment  Sensory level: T8  Swirl obtained: Yes  CSF: clear  Attempts: 1  Hemodynamics: stable

## 2020-12-10 NOTE — H&P
HISTORY AND PHYSICAL    Patient Name:   Bg Heath   ([birth year])  MRN     [id number]   Today's date:    12/10/2020     CHIEF COMPLAINT:  Left knee pain    History Obtained From:  patient    HISTORY OF PRESENT ILLNESS:      Bg Heath is a 79 y.o. male  who presents with pain in the Left knee. The patient has a long-standing history of pain in the Left knee and has failed conservative treatment    Patient is here for total knee replacement. Please see office notes for details      Past Medical History         Diagnosis Date    Abnormal angiogram 10/04/2016    carotid - mod stenosis on right    Arthritis     Left shoulder, left foot    Bulging of lumbar intervertebral disc     x3    CAD (coronary artery disease)     Dr. Jeffery Holcomb about 4 or 5 heart stents- never got any papers on that\"    Diabetes mellitus ([facility] [address]     Type II - Follows with PCP\"just started me on Metformin 5 months ago\" per pt on 12/3/2020    Foot drop, left foot     hx drop foot left - \"from my sciatic nerve but I exercised it and now dont really bother me no more\"    Full dentures     upper and lower    H/O chest pain 10/2016    Last chest pain: 12/2019    H/O echocardiogram 10/23/2019    EF 45-50%, Mild MR. Hypokinesis of the Basal infero-septal segment.  Hearing aid worn     bilateral/[place] both ears    History of nuclear stress test 10/23/2019    perfusion scan shows moderate size, severe intensity, non reversible perfusion defect in inferior wall suggesting inferior wall infarction.     History of PTCA 10/04/2016    ptca svg-diag    HTN (hypertension)     Follows with PCP    Hx of carotid artery stenosis     Hyperlipemia     Macular degeneration     Bilateral eyes (worse in right)- uses magnifying glass to read    MI (myocardial infarction) ([facility] [address] 2006, nov 2016    sees [facility]\"they said at the end of the heart cath had heart attack when they were pulling the wires out in 2016\"    Restless leg syndrome     Takes Gabapentin    Sciatic nerve pain, left 10/2019    leg    Sleep apnea     Uses CPAP    Wears glasses        Past Surgical History         Procedure Laterality Date    CAROTID ENDARTERECTOMY Right 12/14/2016    COLONOSCOPY  2007    Normal exam per pt - Dr. Alina Pope  10/2016    10/2016, 2/12/16, 5/16, 8/16 7 stents total from 4 caths in 2016 = 6 stents    CORONARY ARTERY BYPASS GRAFT  11/27/2006    x 3    ELBOW FRACTURE SURGERY Left 1996    no hardware    EYE SURGERY Bilateral 2013   [address]    from [id number] had 6 hernia surgies, unable to recall even estimates of years    SHOULDER ARTHROPLASTY Left 1/31/2020    SHOULDER TOTAL ARTHROPLASTY REVERSE performed by Nikolas Painter MD at [address] [place] [address]  2259'P       Medications Prior to Admission:     Prior to Admission medications    Medication Sig Start Date End Date Taking? Authorizing Provider   metFORMIN (GLUCOPHAGE) 500 MG tablet Take 500 mg by mouth daily (with breakfast)   Yes Historical Provider, MD   rOPINIRole (REQUIP XL) 2 MG extended release tablet Take 1 mg by mouth nightly   Yes Historical Provider, MD   magnesium oxide (MAG-OX) 400 MG tablet Take 250 mg by mouth 2 times daily   Yes Historical Provider, MD   atorvastatin (LIPITOR) 40 MG tablet Take 1 tablet by mouth daily  Patient taking differently: Take 40 mg by mouth every morning  9/1/16  Yes Rachel Dinh MD   gabapentin (NEURONTIN) 100 MG capsule Take 100 mg by mouth 3 times daily   Yes Historical Provider, MD   famotidine (PEPCID) 20 MG tablet Take 20 mg by mouth 2 times daily   Yes Historical Provider, MD   multivitamin (OCUVITE) TABS per tablet Take 1 tablet by mouth daily.    Yes Historical Provider, MD   losartan (COZAAR) 25 MG tablet Take 12.5 mg by mouth every morning 0.5 tablet daily    Yes Historical Provider, MD   carvedilol (COREG) 6.25 MG tablet Take 6.25 mg by mouth 2 times daily (with meals). Yes Historical Provider, MD   Omega-3 Fatty Acids (FISH OIL) 1000 MG CAPS Take 1,000 mg by mouth daily    Yes Historical Provider, MD   aspirin 81 MG chewable tablet Take 81 mg by mouth nightly    Yes Historical Provider, MD   UNABLE TO FIND uses Neuralgia cream  Twice a day for feet pain    Historical Provider, MD   alirocumab (PRALUENT) 75 MG/ML SOAJ injection pen Inject 1 mL into the skin every 14 days 4/1/20   Shira Green MD   PRALUENT 75 MG/ML SOPN injection pen INJECT 1ML INTO THE SKIN EVERY 14 DAYS 3/22/19   Shira Green MD   BRILINTA 90 MG TABS tablet TAKE 1 TABLET TWICE A DAY 3/1/18   Shira Green MD   PRALUENT 75 MG/ML SOPN injection pen Inject 1 mL into the skin every 14 days 2/3/17 4/1/21  Shira Green MD   nitroGLYCERIN (NITROSTAT) 0.4 MG SL tablet Place 1 tablet under the tongue every 5 minutes as needed for Chest pain 9/1/16   Shira Green MD       Allergies     Patient has no known allergies. Social History   TOBACCO:   reports that he quit smoking about 54 years ago. His smoking use included cigarettes. He started smoking about 66 years ago. He has a 18.00 pack-year smoking history. He quit smokeless tobacco use about 54 years ago. His smokeless tobacco use included chew. ETOH:   reports no history of alcohol use.   Patient currently lives with family     Family History         Problem Relation Age of Onset    High Blood Pressure Mother     Heart Disease Father     Diabetes Brother        Review of Systems   Constitutional:  No weight loss, no night sweats  Eyes:  No visual changes  ENT:  No nasal drainage or ear pain  CV:  No chest pain  PULM:  No cough, no shortness of breath  GI:  No nausea, vomiting, diarrhea  :  No dysuria  Musc:  Knee pain  Neuro: No numbness, tingling or parethesias  Skin:  No rashes  Psych: No depression symptoms    Physical Exam:    Vitals: /75   Pulse 61   Temp 96.9 °F (36.1 °C) (Temporal)   Resp 16   Ht 5' 9\" (1.753 m)   Wt 239 lb (108.4 kg)   SpO2 98%   BMI 35.29 kg/m² ,  Body mass index is 35.29 kg/m². General appearance: alert, appears stated age and cooperative  Skin: Skin color, texture, turgor normal. No rashes or lesions  HEENT: Head: Normocephalic, no lesions, without obvious abnormality. Neck: no adenopathy, no carotid bruit, no JVD, supple, symmetrical, trachea midline and thyroid not enlarged, symmetric, no tenderness/mass/nodules  Lungs: clear to auscultation bilaterally  Heart: regular rate and rhythm, S1, S2 normal, no murmur, click, rub or gallop  Abdomen: soft, non-tender; bowel sounds normal; no masses,  no organomegaly  Extremities: Left knee. Tenderness over medial & lateral joint. Slight flexion contracture. Stable to varus valgus stress. Good distal pulses. Gross motor intact. No change from office notes  Neurologic: Mental status: Alert, oriented, thought content appropriate    Labs     CBC: No results for input(s): WBC, HGB, PLT in the last 72 hours. BMP:  No results for input(s): NA, K, CL, CO2, BUN, CREATININE, GLUCOSE in the last 72 hours. INR: No results for input(s): INR in the last 72 hours. Assessment and Plan         1. Left Knee end stage osteoarthritis    Plan:  Left Total knee replacement    I have discussed the alternatives of continued nonoperative treatment, observation, therapy, and medication treatment versus knee replacement surgery with the patient. The patient is having daily pain that is impacting on quality of life. Having trouble with stairs, unable to sleep and has a fear of falling. The patient feels that conservative treatment has failed and wishes to proceed with surgical treatment. We will plan knee replacement surgery.     Anesthetic risks of surgery will be discussed with the patient by the anesthesia department. Risks of the procedure include but not limited to infection, residual pain, deep venous clot, pulmonary embolism, bleeding, residual stiffness, postoperative stoke and heart attack and possibly death. The need for revision surgery is always a distinct possibility. The patient will require postoperative physical therapy for global knee rehabilitation. Patient will be given both chemo (Resume Brilinta & ASA) and mechanical (EDWIN hoes and SCD's) DVT prophylaxis post-operatively. The patient will be given detailed information of the postoperative recovery and restrictions. All questions were answered. The patient understands the risks and wished to proceed with surgery.     Edwin Gallagher MD

## 2020-12-10 NOTE — OP NOTE
[address] [place] [address], [address] [place] [address]                                OPERATIVE REPORT    PATIENT NAME: Ofe Canas                   :        1940  MED REC NO:   [id number]                          ROOM:       7864  ACCOUNT NO:   [de-identified]                           ADMIT DATE: 12/10/2020  PROVIDER:     Jr Torres MD    DATE OF PROCEDURE:  12/10/2020    PREOPERATIVE DIAGNOSIS:  Left knee end-stage primary osteoarthritis. POSTOPERATIVE DIAGNOSIS:  Left knee end-stage primary osteoarthritis. PROCEDURE PERFORMED:  Left total knee arthroplasty. SURGEON:  Jr Torres MD    ASSISTANT:  Ron Wayne PA-C, who assisted in the entire portion  of the case helping to hold me retract neurovascular structures. Aided  in making patella, femoral, tibial cuts. Aided in final implantation of  components, closure of the wound, application of dressing, and transfer  of the patient. ANESTHESIA:  Spinal with adductor canal block. COMPONENTS USED:  1. MicroPort Evolution. 2.  Femur size 6.  3.  Tibia size 6+. 4.  Tibial insert 14 mm-CS. 5.  Patella size 32. DRAINS:  One medium Hemovac. ESTIMATED BLOOD LOSS:  Minimal.    SPECIMENS:  None. COMPLICATIONS:  None. INDICATION FOR PROCEDURE:  A 62-year-old gentleman with continued pain  with regards to his left knee. He failed conservative treatments. He  wished to proceed with operative intervention. He was explained the  risks and benefits of surgical intervention, outlined in the office  notes. DESCRIPTION OF PROCEDURE:  The patient was brought back to the operating  room and placed supine on the operating table. A tourniquet was placed  around the left proximal thigh. Left lower extremity was then prepped  and draped in a usual manner.   A timeout was then performed confirming  the patient's name, operative site, proposed procedure, known 14.  Therefore, size 14 was then utilized. We then pulsed lavaged the knee. We then infiltrated the soft tissue and surrounding area with  combination of 60 mL of 0.5% Marcaine with epinephrine and 5 mg of  morphine. This was done in the soft tissue and surrounding area. We  then ultimately cemented the final components in place, the tibia  followed by the femur followed by the patella. We then pulsed lavaged  the knee. We placed the medium Hemovac drain deep. We then closed our  arthrotomy with #2 FiberWire stitch. The subcutaneous tissue was closed  with 2-0 Vicryl stitch and the skin was ultimately closed with Dermabond  Prineo tape. Sterile dressings were applied, tourniquet was let down,  and the patient was brought back to the recovery room in stable  condition.         Madeline Quiroga MD    D: 12/10/2020 9:29:53       T: 12/10/2020 15:08:45     [id number]  Job#: [id number]     Doc#: [id number]    CC:

## 2020-12-10 NOTE — PROGRESS NOTES
Physical Therapy  Attempted to see pt for PT eval. Pt reported he had a \"spinal\" and still can't feel or move his left leg. Pt fearful of getting hurt moving right now. Will re-attempt when pt better able to participate.

## 2020-12-10 NOTE — PROGRESS NOTES
0940- pt rec'd from the OR and placed on pacu monitor with alarms on. Report rec'd from Obi GRIFFITHS and OR nurse. Pt arousing and following commands. Oriented to surroundings. resps even and unlabored. Left knee dressing dry and intact. Pt unable to wiggle toes at this time due to spinal. Pt denies pain. 1015- xrays done per orders  1045- incont of large amount of urine. Batsheva cares done. Turned and repositioned. 1140- pt denies pain. Sensation at knee level. Pt transferred to room 1108 via bed and transporter. Hearing aides and dentures in place. All other valuables transferred with pt. Wife updated.

## 2020-12-10 NOTE — ANESTHESIA POSTPROCEDURE EVALUATION
Department of Anesthesiology  Postprocedure Note    Patient: Mario Olmos  MRN: [id number]  YOB: 1940  Date of evaluation: 12/10/2020  Time:  9:43 AM     Procedure Summary     Date:  12/10/20 Room / Location:  [address]    Anesthesia Start:  1418 Anesthesia Stop:  1167    Procedure:  LEFT KNEE TOTAL ARTHROPLASTY (Left Knee) Diagnosis:  (PRIMARY OSTEOARTHRITIS OF LEFT KNEE, LEFT KNEE PAIN)    Surgeon:  Mega Link MD Responsible Provider:  ERENDIRA Mcnulty CRNA    Anesthesia Type:  general, regional, spinal, TIVA ASA Status:  4          Anesthesia Type: general, regional, spinal, TIVA    Marcella Phase I: Marcella Score: 10    Marcella Phase II:      Last vitals: Reviewed and per EMR flowsheets.        Anesthesia Post Evaluation    Patient location during evaluation: bedside  Patient participation: complete - patient participated  Level of consciousness: awake and alert  Pain score: 0  Airway patency: patent  Nausea & Vomiting: no vomiting and no nausea  Complications: no  Cardiovascular status: blood pressure returned to baseline and hemodynamically stable  Respiratory status: acceptable, room air, spontaneous ventilation and nonlabored ventilation  Hydration status: stable

## 2020-12-11 LAB
ALBUMIN SERPL-MCNC: 3.5 GM/DL (ref 3.4–5)
ALP BLD-CCNC: 80 IU/L (ref 40–129)
ALT SERPL-CCNC: 6 U/L (ref 10–40)
ANION GAP SERPL CALCULATED.3IONS-SCNC: 11 MMOL/L (ref 4–16)
AST SERPL-CCNC: 16 IU/L (ref 15–37)
BASOPHILS ABSOLUTE: 0 K/CU MM
BASOPHILS RELATIVE PERCENT: 0.2 % (ref 0–1)
BILIRUB SERPL-MCNC: 1.1 MG/DL (ref 0–1)
BUN BLDV-MCNC: 14 MG/DL (ref 6–23)
CALCIUM SERPL-MCNC: 8.4 MG/DL (ref 8.3–10.6)
CHLORIDE BLD-SCNC: 96 MMOL/L (ref 99–110)
CO2: 25 MMOL/L (ref 21–32)
CREAT SERPL-MCNC: 1.1 MG/DL (ref 0.9–1.3)
DIFFERENTIAL TYPE: ABNORMAL
EOSINOPHILS ABSOLUTE: 0 K/CU MM
EOSINOPHILS RELATIVE PERCENT: 0.3 % (ref 0–3)
ESTIMATED AVERAGE GLUCOSE: 140 MG/DL
GFR AFRICAN AMERICAN: >60 ML/MIN/1.73M2
GFR NON-AFRICAN AMERICAN: >60 ML/MIN/1.73M2
GLUCOSE BLD-MCNC: 184 MG/DL (ref 70–99)
GLUCOSE BLD-MCNC: 208 MG/DL (ref 70–99)
HBA1C MFR BLD: 6.5 % (ref 4.2–6.3)
HCT VFR BLD CALC: 36.4 % (ref 42–52)
HEMOGLOBIN: 12.3 GM/DL (ref 13.5–18)
IMMATURE NEUTROPHIL %: 0.3 % (ref 0–0.43)
LYMPHOCYTES ABSOLUTE: 0.7 K/CU MM
LYMPHOCYTES RELATIVE PERCENT: 5.9 % (ref 24–44)
MCH RBC QN AUTO: 30.7 PG (ref 27–31)
MCHC RBC AUTO-ENTMCNC: 33.8 % (ref 32–36)
MCV RBC AUTO: 90.8 FL (ref 78–100)
MONOCYTES ABSOLUTE: 1.2 K/CU MM
MONOCYTES RELATIVE PERCENT: 10.1 % (ref 0–4)
NUCLEATED RBC %: 0 %
PDW BLD-RTO: 13 % (ref 11.7–14.9)
PLATELET # BLD: 145 K/CU MM (ref 140–440)
PMV BLD AUTO: 9.7 FL (ref 7.5–11.1)
POTASSIUM SERPL-SCNC: 4.3 MMOL/L (ref 3.5–5.1)
RBC # BLD: 4.01 M/CU MM (ref 4.6–6.2)
SEGMENTED NEUTROPHILS ABSOLUTE COUNT: 9.7 K/CU MM
SEGMENTED NEUTROPHILS RELATIVE PERCENT: 83.2 % (ref 36–66)
SODIUM BLD-SCNC: 132 MMOL/L (ref 135–145)
TOTAL IMMATURE NEUTOROPHIL: 0.04 K/CU MM
TOTAL NUCLEATED RBC: 0 K/CU MM
TOTAL PROTEIN: 5.7 GM/DL (ref 6.4–8.2)
WBC # BLD: 11.7 K/CU MM (ref 4–10.5)

## 2020-12-11 PROCEDURE — 97166 OT EVAL MOD COMPLEX 45 MIN: CPT

## 2020-12-11 PROCEDURE — G0378 HOSPITAL OBSERVATION PER HR: HCPCS

## 2020-12-11 PROCEDURE — 83036 HEMOGLOBIN GLYCOSYLATED A1C: CPT

## 2020-12-11 PROCEDURE — 96375 TX/PRO/DX INJ NEW DRUG ADDON: CPT

## 2020-12-11 PROCEDURE — 36415 COLL VENOUS BLD VENIPUNCTURE: CPT

## 2020-12-11 PROCEDURE — 6370000000 HC RX 637 (ALT 250 FOR IP): Performed by: ORTHOPAEDIC SURGERY

## 2020-12-11 PROCEDURE — 96376 TX/PRO/DX INJ SAME DRUG ADON: CPT

## 2020-12-11 PROCEDURE — 80053 COMPREHEN METABOLIC PANEL: CPT

## 2020-12-11 PROCEDURE — 2580000003 HC RX 258: Performed by: ORTHOPAEDIC SURGERY

## 2020-12-11 PROCEDURE — 96374 THER/PROPH/DIAG INJ IV PUSH: CPT

## 2020-12-11 PROCEDURE — 6360000002 HC RX W HCPCS: Performed by: ORTHOPAEDIC SURGERY

## 2020-12-11 PROCEDURE — 82962 GLUCOSE BLOOD TEST: CPT

## 2020-12-11 PROCEDURE — 97116 GAIT TRAINING THERAPY: CPT

## 2020-12-11 PROCEDURE — 97530 THERAPEUTIC ACTIVITIES: CPT

## 2020-12-11 PROCEDURE — 85025 COMPLETE CBC W/AUTO DIFF WBC: CPT

## 2020-12-11 PROCEDURE — 97162 PT EVAL MOD COMPLEX 30 MIN: CPT

## 2020-12-11 PROCEDURE — 97110 THERAPEUTIC EXERCISES: CPT

## 2020-12-11 RX ADMIN — TICAGRELOR 60 MG: 60 TABLET ORAL at 21:34

## 2020-12-11 RX ADMIN — CEFAZOLIN SODIUM 2 G: 10 INJECTION, POWDER, FOR SOLUTION INTRAVENOUS at 00:46

## 2020-12-11 RX ADMIN — MAGNESIUM OXIDE 400 MG (241.3 MG MAGNESIUM) TABLET 200 MG: TABLET at 08:57

## 2020-12-11 RX ADMIN — OXYCODONE HYDROCHLORIDE 5 MG: 5 TABLET ORAL at 15:33

## 2020-12-11 RX ADMIN — LOSARTAN POTASSIUM 12.5 MG: 25 TABLET, FILM COATED ORAL at 08:58

## 2020-12-11 RX ADMIN — DOCUSATE SODIUM 50 MG AND SENNOSIDES 8.6 MG 1 TABLET: 8.6; 5 TABLET, FILM COATED ORAL at 08:58

## 2020-12-11 RX ADMIN — GABAPENTIN 100 MG: 100 CAPSULE ORAL at 15:33

## 2020-12-11 RX ADMIN — CARVEDILOL 6.25 MG: 6.25 TABLET, FILM COATED ORAL at 08:58

## 2020-12-11 RX ADMIN — ONDANSETRON 4 MG: 2 INJECTION INTRAMUSCULAR; INTRAVENOUS at 03:21

## 2020-12-11 RX ADMIN — ACETAMINOPHEN 650 MG: 325 TABLET ORAL at 12:18

## 2020-12-11 RX ADMIN — GABAPENTIN 100 MG: 100 CAPSULE ORAL at 21:30

## 2020-12-11 RX ADMIN — ROPINIROLE HYDROCHLORIDE 2 MG: 1 TABLET, FILM COATED ORAL at 21:29

## 2020-12-11 RX ADMIN — SODIUM CHLORIDE, PRESERVATIVE FREE 10 ML: 5 INJECTION INTRAVENOUS at 03:23

## 2020-12-11 RX ADMIN — HYDROMORPHONE HYDROCHLORIDE 0.5 MG: 1 INJECTION, SOLUTION INTRAMUSCULAR; INTRAVENOUS; SUBCUTANEOUS at 08:55

## 2020-12-11 RX ADMIN — SODIUM CHLORIDE, POTASSIUM CHLORIDE, SODIUM LACTATE AND CALCIUM CHLORIDE: 600; 310; 30; 20 INJECTION, SOLUTION INTRAVENOUS at 23:06

## 2020-12-11 RX ADMIN — SODIUM CHLORIDE, POTASSIUM CHLORIDE, SODIUM LACTATE AND CALCIUM CHLORIDE: 600; 310; 30; 20 INJECTION, SOLUTION INTRAVENOUS at 12:22

## 2020-12-11 RX ADMIN — GABAPENTIN 100 MG: 100 CAPSULE ORAL at 08:58

## 2020-12-11 RX ADMIN — FAMOTIDINE 20 MG: 20 TABLET, FILM COATED ORAL at 08:58

## 2020-12-11 RX ADMIN — OXYCODONE HYDROCHLORIDE 5 MG: 5 TABLET ORAL at 03:23

## 2020-12-11 RX ADMIN — MAGNESIUM OXIDE 400 MG (241.3 MG MAGNESIUM) TABLET 200 MG: TABLET at 21:29

## 2020-12-11 RX ADMIN — ASPIRIN 325 MG: 325 TABLET, COATED ORAL at 08:57

## 2020-12-11 RX ADMIN — FAMOTIDINE 20 MG: 20 TABLET, FILM COATED ORAL at 21:29

## 2020-12-11 RX ADMIN — ACETAMINOPHEN 650 MG: 325 TABLET ORAL at 00:50

## 2020-12-11 RX ADMIN — ONDANSETRON 4 MG: 2 INJECTION INTRAMUSCULAR; INTRAVENOUS at 12:35

## 2020-12-11 RX ADMIN — TICAGRELOR 60 MG: 60 TABLET ORAL at 08:58

## 2020-12-11 RX ADMIN — CARVEDILOL 6.25 MG: 6.25 TABLET, FILM COATED ORAL at 18:17

## 2020-12-11 RX ADMIN — SODIUM CHLORIDE, POTASSIUM CHLORIDE, SODIUM LACTATE AND CALCIUM CHLORIDE: 600; 310; 30; 20 INJECTION, SOLUTION INTRAVENOUS at 03:21

## 2020-12-11 RX ADMIN — ACETAMINOPHEN 650 MG: 325 TABLET ORAL at 23:44

## 2020-12-11 RX ADMIN — OMEGA-3-ACID ETHYL ESTERS 1000 MG: 1 CAPSULE, LIQUID FILLED ORAL at 08:57

## 2020-12-11 RX ADMIN — ACETAMINOPHEN 650 MG: 325 TABLET ORAL at 18:17

## 2020-12-11 RX ADMIN — SODIUM CHLORIDE, PRESERVATIVE FREE 10 ML: 5 INJECTION INTRAVENOUS at 21:30

## 2020-12-11 RX ADMIN — ATORVASTATIN CALCIUM 40 MG: 40 TABLET, FILM COATED ORAL at 08:58

## 2020-12-11 RX ADMIN — DOCUSATE SODIUM 50 MG AND SENNOSIDES 8.6 MG 1 TABLET: 8.6; 5 TABLET, FILM COATED ORAL at 21:29

## 2020-12-11 RX ADMIN — OXYCODONE HYDROCHLORIDE 5 MG: 5 TABLET ORAL at 21:37

## 2020-12-11 ASSESSMENT — PAIN DESCRIPTION - PAIN TYPE: TYPE: SURGICAL PAIN

## 2020-12-11 ASSESSMENT — PAIN SCALES - GENERAL
PAINLEVEL_OUTOF10: 8
PAINLEVEL_OUTOF10: 7
PAINLEVEL_OUTOF10: 8
PAINLEVEL_OUTOF10: 6

## 2020-12-11 ASSESSMENT — PAIN DESCRIPTION - ORIENTATION: ORIENTATION: LEFT

## 2020-12-11 ASSESSMENT — PAIN DESCRIPTION - LOCATION: LOCATION: KNEE

## 2020-12-11 NOTE — PROGRESS NOTES
Physical Therapy    Physical Therapy Treatment Note  Name: Mario Olmos MRN: [id number] :   1940   Date:  2020   Admission Date: 12/10/2020 Room:  [address] [place], [place] [address]A   Restrictions/Precautions:        WBAT LLE   Subjective:  Patient states: \"Lets give it a try! \"  Pain:   Location, Type, Intensity (0/10 to 10/10):  5/10 left knee   Objective:    Observation:    Supine in bed. Cooperative with therapy. Felt a little nauseated but more tolerable compared to a.m session. Rest breaks needed to let nausea subside. Treatment, including education/measures:  Supine to sit: SBA with instructions for use of leg . Inc time and effort. HOB elevated ~45 deg  Sit to stand: CGA from EOB, Zoraida from toilet. Cues for use of grab bar and bilat LE set up under JAROCHO. Stand to sit: CGA for safety to recliner and toilet. Cues for use of grab bar in the bathroom and LLE positioning in slight extension   Step pivot: CGA (2x) with RW   Gait: 10ft +  ~80ft with RW CGA for safety. Antalgic, step to step gait pattern with dec WB to LLE. Inc reliance on walker. Slow pace with fwd posture. Cued for uprighting trunk. Good awareness to body positioning within RW throughout distance. Standing balance: CGA to SBA for safety at sink performing hand hygiene tasks with single to no UE support   Sitting balance: SBA at EOB as well as while at toilet performing jessie care. Exercise: AAROM and use of leg  with heel slide and hip abduction/adduction. AAROM knee flexion and extension (use of towel under foot to start with sliding). AROM quad sets and ankle pumps. Provided with hand out but due to low vision was unable to read them. Performed 6-10 reps. Nausea increased with increased exertion limiting his tolerance and reps. Educated pt on POC, role of PT, DME use, discharge recommendation. Cues provided to inc safety and indep with mobility.    Assessment / Impression:    Patient's tolerance of treatment:  Good   Adverse Reaction: nausea   Significant change in status and impact:  Improved tolerance to activity   Barriers to improvement:  Activity tolerance, strength, balance, ROM, pain, nausea   Plan for Next Session:    Activity tolerance, strength, balance, gait, transfers, bed mobility, stairs   Time in:  1345  Time out:  1425  Timed treatment minutes: 40  Total treatment time:  40    Previously filed items:  Social/Functional History  Lives With: Spouse  Type of Home: House  Home Layout: One level  Home Access: Stairs to enter with rails  Entrance Stairs - Number of Steps: 1 through garage  Bathroom Shower/Tub: Tub/Shower unit  Bathroom Toilet: Standard  ADL Assistance: [facility]: Independent  Homemaking Responsibilities: Yes  Ambulation Assistance: Independent(uses no AD)  Transfer Assistance: Independent  Active : No(due to macular degeneration)  Occupation: Retired  Leisure & Hobbies: Fishing, Making [employer] hooks  Short term goals  Time Frame for Short term goals: 1 week  Short term goal 1: Pt will perform sit><supine SBA  Short term goal 2: Pt will transition sit><stand SBA  Short term goal 3: Pt will transfer between surfaces SBA  Short term goal 4: Pt will ambulate 100ft with RW SBA  Short term goal 5: Pt will ascend/descend 3 steps, 1-2 rails Zoraida       Electronically signed by:    Nury Hollingsworth, [id number]  12/11/2020, 2:01 PM

## 2020-12-11 NOTE — PROGRESS NOTES
Occupational Therapy  [facility] ACUTE CARE OCCUPATIONAL THERAPY EVALUATION    Alejandro Stafford WZKRYOSHILA, 1940, 1108/1108-A, 12/11/2020    Discharge Recommendation: Inpatient Rehabilitation      History:  [place]:  There were no encounter diagnoses. Subjective:  Patient states: \"The nausea keeps stopping me\"  Pain: Pt reported 4-5/10 surgical pain in Lt knee  Communication with other providers: PT Nicole  Restrictions: General Precautions, Fall Risk, WBAT Lt LE, IV, SCD    Home Setup/Prior level of function:  Social/Functional History  Lives With: Spouse  Type of Home: House  Home Layout: One level  Home Access: Stairs to enter with rails  Entrance Stairs - Number of Steps: 1 through garage  Bathroom Shower/Tub: Tub/Shower unit  Bathroom Toilet: Standard  ADL Assistance: [address]: Independent  Homemaking Responsibilities: Yes  Ambulation Assistance: Independent (uses no AD)  Transfer Assistance: Independent  Active : No (due to macular degeneration)  Occupation: Retired  Leisure & Hobbies: Fishing, Making fishing hooks    Examination:  · Observation: Supine in bed upon arrival; pt with frequent boughts of nausea this date, required increased time & effort and frequent rest breaks  · Vision: Impaired (history of macular degeneration and does not drive, WFL for ADLs/functoinal mobility)  · Hearing: Slightly [place]  · Vitals: Stable vitals throughout session    Body Systems and functions:  · ROM: WNL all joints in BL UEs  · Strength: 4+/5 MMT all major muscle groups BL UEs  · Sensation: WFL  · Tone: Normal  · Coordination: WFL  · Perception: WNL    Activities of Daily Living (ADLs):  · Feeding: Independent  · Grooming: SUP (recommend SUP d/t pt with decreased tolerance secondary to nausea)   · UB bathing: SBA  · LB bathing: Min A (thoroughenss with distal Lt LE d/t limited knee flexion and nausea exacerbated by forward trunk flexion)  · UB dressing: SBA  · LB dressing:  Mod A (dependent with donning Lt sock, able to don Rt sock CGA by leaning forward with increased time/effort required)  · Toileting: Min A (dynamic standing balance with clothing management)    Cognitive and Psychosocial Functioning:  · Overall cognitive status: WFL  · Affect: Normal     Balance:   · Sitting: good; pt sat EOB with UE support given SUP  · Standing: good; pt stood at walker with CGA    Functional Mobility:  · Bed Mobility: pt required min A this date to bring LLE to EOB d/t pain and decreased mobility to LLE  · Transfers: Pt completed STS from EOB to stand at [facility] given min A; VC for safe hand foot placement and sequencing; pt demo good safety awareness    · Ambulation: Pt ambulated ~20ft with FWW; min A initially progressing to CGA. Pt with frequent boughts of nausea requiring increased time and effort, and standing rest breaks      AM-PAC 6 click short form for inpatient daily activity:   How much help from another person does the patient currently need. .. Unable  Dep A Lot  Max A A Lot   Mod A A Little  Min A A Little   CGA  SBA None   Mod I  Indep  Sup   1. Putting on and taking off regular lower body clothing? [] 1    [] 2   [x] 2   [] 3   [] 3   [] 4      2. Bathing (including washing, rinsing, drying)? [] 1   [] 2   [] 2 [x] 3 [] 3 [] 4   3. Toileting, which includes using toilet, bedpan, or urinal? [] 1    [] 2   [] 2   [x] 3   [] 3   [] 4     4. Putting on and taking off regular upper body clothing? [] 1   [] 2   [] 2   [] 3   [x] 3    [] 4      5. Taking care of personal grooming such as brushing teeth? [] 1   [] 2    [] 2 [] 3    [x] 3   [] 4      6. Eating meals? [] 1   [] 2   [] 2   [] 3   [] 3   [x] 4      Raw Score:  18    [24=0% impaired(CH), 23=1-19%(CI), 20-22=20-39%(CJ), 15-19=40-59%(CK), 10-14=60-79%(CL), 7-9=80-99%(CM), 6=100%(CN)]     Treatment:  Therapeutic Activity Training:   Therapeutic activity training was instructed today.   Cues were given for safety, sequence, UE/LE

## 2020-12-11 NOTE — CARE COORDINATION
CM met w/ pt to initiate discharge plan. Pt is from home w/ his wife. Pt states he has one large step to get in if he enters through the back of his house and then it is all one level. Pt has no DME at home currently. Pt has PCP and insurance with affordable RX copays. Pt had planned to attend OP therapy. CM discussed therapy recommendations of ARU with pt and he is in agreement for referral. Referral called to Rene Ribeiro, ARU admissions.

## 2020-12-11 NOTE — PROGRESS NOTES
Physical Therapy  [facility] ACUTE CARE PHYSICAL THERAPY EVALUATION  Reva Moreno TEWJHIV, 1940, 1108/1108-A, 12/11/2020    History  [place]:  There were no encounter diagnoses. Patient  has a past medical history of Abnormal angiogram, Arthritis, Bulging of lumbar intervertebral disc, CAD (coronary artery disease), Diabetes mellitus ([address] Foot drop, left foot, Full dentures, H/O chest pain, H/O echocardiogram, Hearing aid worn, History of nuclear stress test, History of PTCA, HTN (hypertension), Hx of carotid artery stenosis, Hyperlipemia, Macular degeneration, MI (myocardial infarction) ([address] Restless leg syndrome, Sciatic nerve pain, left, Sleep apnea, and Wears glasses. Patient  has a past surgical history that includes Coronary angioplasty with stent (10/2016); hernia repair (1962); hernia repair (1991); Elbow fracture surgery (Left, 1996); eye surgery (Bilateral, 2013); Carotid endarterectomy (Right, 12/14/2016); Coronary artery bypass graft (11/27/2006); Colonoscopy (2007); Tonsillectomy (1950's); and Shoulder Arthroplasty (Left, 1/31/2020). Subjective:  Patient states: \"I could do all of this no problem if I wasn't feeling so sick\"   Pain:  5/10 left knee   Communication with other providers:   co-eval with Ruddy ANTUNEZ   Restrictions: WBAT LLE     Home Setup/Prior level of function  Social/Functional History  Lives With: Spouse  Type of Home: House  Home Layout: One level  Home Access: Stairs to enter with rails  Entrance Stairs - Number of Steps: 1 through garage  Bathroom Shower/Tub: Tub/Shower unit  Bathroom Toilet: Standard  ADL Assistance: Independent  Homemaking Assistance: Independent  Active : No    Examination of body systems (includes body structures/functions, activity/participation limitations):  · Observation:  Supine in bed upon arrival. Very cooperative to work with therapy though limited with feeling nauseated and dizzy.    · Vision:   Macular degeneration   · Hearing: [place], bilat hearing aids   · Cardiopulmonary:  Vital stable throughout session on room air    Musculoskeletal  · ROM R/L:  WFL RLE, L knee grossly 5-80deg. · Strength R/L:  RLE 5/5, LLE 3-/5. Minimal strength deficits observed in function and endurance. Mobility/treatment:   · Rolling L/R:  NT   · Supine to sit:  Zoraida for LLE advancement and anterior weight shift facilitation of trunk. · Transfers:   · Sit to stand: Zoraida from EOB for small amount of lift and steadying assist   · Stand to sit: Zoraida for eccentric control to recliner. Cues for hand sequencing and LLE position to dec pain and WB. · Sitting balance:  SBA at EOB static and dynamic while managing socks   · Standing balance:  CGA at RW static x ~1 minute prior to initiating gait   · Gait: ~30ft with RW Zoraida progressing to CGA. Small amount of lateral weight shift facilitation and steadying initially that improved with time. Dec overall tolerance needing multiple standing rest breaks due to feeling nauseated. Step to step gait pattern with fwd posture and downward gaze. Antalgic with dec WB tolerance to LLE. Inc reliance on RW for support  · Educated pt on POC, role of PT, DME use, WBAT. Cues for sequencing to inc safety and indep with mobility     [facility] 6 Clicks Inpatient Mobility:  AM-PAC Inpatient Mobility Raw Score : 18    Safety: patient left in chair, call light within reach,  gait belt used. Assessment:  Pt is a 79year old male admitted for a left TKA on 12/10. Recommend ARU once medically stable. At baseline he is indep with gross mobility and ADLs. He is currently requiring Zoraida with dec activity tolerance. He would benefit from continued therapy to address his current deficits, dec potential fall risk, and restore function. Complexity: Moderate  Prognosis: Good, no significant barriers to participation at this time.    Plan Times per week: 7 (BID)/week, 1 week  Discharge Recommendations: IP Rehab  Equipment: RW, BSC Goals:  Short term goals  Time Frame for Short term goals: 1 week  Short term goal 1: Pt will perform sit><supine SBA  Short term goal 2: Pt will transition sit><stand SBA  Short term goal 3: Pt will transfer between surfaces SBA  Short term goal 4: Pt will ambulate 100ft with RW SBA  Short term goal 5: Pt will ascend/descend 3 steps, 1-2 rails Zoraida       Treatment plan:  Bed mobility, transfers, balance, gait, TA, TX, stairs     Recommendations for NURSING mobility: ambulate with RW to the bathroom     Time:   Time in: 0908  Time out: 0938  Timed treatment minutes: 15  Total time: 30    Electronically signed by:    Silvestre Ibarra [id number]  12/11/2020, 12:34 PM

## 2020-12-11 NOTE — PROGRESS NOTES
Melina Michele (1940)    Daily Progress Note- Bony Byrne MD                        Hospital Day: 2     Today's Date   12/11/2020    Subjective:     Awake and alert this morning  Had nausea and vomiting yesterday and this morning  Pain controlled and pain is perceived as severe (6-8 pain scale)  Denies shortness of breath or chest pain. Objective:     Patient Vitals for the past 4 hrs:   BP Temp Temp src Pulse Resp SpO2   12/11/20 0327 (!) 155/64 98.4 °F (36.9 °C) Oral 86 16 95 %     I/O last 3 completed shifts: In: 1100 [I.V.:1100]  Out: 1120 [Urine:500; Emesis/NG output:450; Drains:120; Blood:50]  DRAIN/TUBE OUTPUT:  Closed/Suction Drain Left; Anterior Knee Accordion 10 [place]-Output (ml): 120 ml    Physical Exam:   Orientation:  alert and oriented to person, place and time    Left Lower Extremity    Incision:  dressing in place, clean, dry and intact    Lower Extremity Motor :    Moving lower extremities without difficulty today. Able to dorsiflex and   plantar flex foot/ankle. Lower Extremity Sensory:   Neurovascularly intact to gross sensation and touch in lower extremities. Pulses:    present 2+ bilaterally lower extremities. LABS   CBC: No results for input(s): WBC, HGB, PLT in the last 72 hours. BMP:  No results for input(s): NA, K, CL, CO2, BUN, CREATININE, GLUCOSE in the last 72 hours.       Medications   Meds:    losartan  12.5 mg Oral QAM    carvedilol  6.25 mg Oral BID WC    omega-3 acid ethyl esters  1,000 mg Oral Daily    famotidine  20 mg Oral BID    gabapentin  100 mg Oral TID    atorvastatin  40 mg Oral Daily    alirocumab  75 mg Subcutaneous Q14 Days    ticagrelor  60 mg Oral BID    rOPINIRole  2 mg Oral Nightly    magnesium oxide  200 mg Oral BID    tobramycin  2.4 g Intramuscular Once    sodium chloride flush  10 mL Intravenous 2 times per day    acetaminophen  650 mg Oral Q6H    sennosides-docusate sodium  1 tablet Oral BID    aspirin  325 mg Oral Daily       Assessment and Plan     POD#1 Left total knee arthroplasty. (12/11/20)    1:  Mobilize with Physical therapy-WBAT   -Work on ROM and quad strengthening   -Unable to do PT yesterday due to nausea and block  2:  Continue Deep venous thrombosis prophylaxis   -Resume ASA, Brilenta, SCD, EDWIN hose   -Mobility  3:  Continue Pain Control  4.   Will monitor nausea today and plan DC home tomorrow if tolerating PO intake  6:  D/C Plan:  Evita Sharma MD

## 2020-12-11 NOTE — CARE COORDINATION
Referral received for ARU. Will review PT/OT notes/evaluations and clinical information and discuss with Dr. Ken Steel. Thank you for the referral.     Servando Pal approved patient for ARU. Gael initiated with [place] [employer] pending ref # [id number]. Will follow for determination.

## 2020-12-11 NOTE — CONSULTS
IM Consult      Reason for admission: total left knee arthroplasty    History Obtained From:  patient    HISTORY OF PRESENT ILLNESS:    The patient is a 79 y.o. male who presents to hospital on 12/10/2020 for left knee replacement. He has history of osteoarthritis, HTN, CAD, GERD, HLD, neuropathy, DM 2, restless legs, memory impairment. Patient has history of chronic left knee pain not improving with conservative management. Dr. Girish Perdue performed total left knee arthroplasty on 12/10/2020. Patient tolerated procedure well. Patient did have some nausea last night and this a.m. Patient reports pain is controlled on analgesics. Vitals are stable, the patient is afebrile. Left leg is wrapped, drain is in place. He denies chest pain, shortness of breath, fever, chills, abdominal pain. The patient urinates without difficulty. Patient has not had bowel movement. We have been consulted for medical management of patient. Past Medical History:        Diagnosis Date    Abnormal angiogram 10/04/2016    carotid - mod stenosis on right    Arthritis     Left shoulder, left foot    Bulging of lumbar intervertebral disc     x3    CAD (coronary artery disease)     Dr. Gandara Grounds about 4 or 5 heart stents- never got any papers on that\"    Diabetes mellitus ([facility]     Type II - Follows with PCP\"just started me on Metformin 5 months ago\" per pt on 12/3/2020    Foot drop, left foot     hx drop foot left - \"from my sciatic nerve but I exercised it and now dont really bother me no more\"    Full dentures     upper and lower    H/O chest pain 10/2016    Last chest pain: 12/2019    H/O echocardiogram 10/23/2019    EF 45-50%, Mild MR. Hypokinesis of the Basal infero-septal segment.     Hearing aid worn     bilateral/[place] both ears    History of nuclear stress test 10/23/2019    perfusion scan shows moderate size, severe intensity, non reversible perfusion defect in inferior wall suggesting inferior wall infarction.     History of PTCA 10/04/2016    ptca svg-diag    HTN (hypertension)     Follows with PCP    Hx of carotid artery stenosis     Hyperlipemia     Macular degeneration     Bilateral eyes (worse in right)- uses magnifying glass to read    MI (myocardial infarction) ([facility] [address] 2006, nov 2016    sees Matthew Zabala negrita\"they said at the end of the heart cath had heart attack when they were pulling the wires out in 2016\"    Restless leg syndrome     Takes Gabapentin    Sciatic nerve pain, left 10/2019    leg    Sleep apnea     Uses CPAP    Wears glasses        Past Surgical History:        Procedure Laterality Date    CAROTID ENDARTERECTOMY Right 12/14/2016    COLONOSCOPY  2007    Normal exam per pt - Dr. Laverne Garrido  10/2016    10/2016, 2/12/16, 5/16, 8/16 7 stents total from 4 caths in 2016 = 6 stents    CORONARY ARTERY BYPASS GRAFT  11/27/2006    x 3    ELBOW FRACTURE SURGERY Left 1996    no hardware    EYE SURGERY Bilateral 2013   [address]    from [id number] had 6 hernia surgies, unable to recall even estimates of years    SHOULDER ARTHROPLASTY Left 1/31/2020    SHOULDER TOTAL ARTHROPLASTY REVERSE performed by Katy Red MD at [address]       Medications Prior to Admission:    Medications Prior to Admission: metFORMIN (GLUCOPHAGE) 500 MG tablet, Take 500 mg by mouth daily (with breakfast)  rOPINIRole (REQUIP XL) 2 MG extended release tablet, Take 1 mg by mouth nightly  magnesium oxide (MAG-OX) 400 MG tablet, Take 250 mg by mouth 2 times daily  atorvastatin (LIPITOR) 40 MG tablet, Take 1 tablet by mouth daily (Patient taking differently: Take 40 mg by mouth every morning )  gabapentin (NEURONTIN) 100 MG capsule, Take 100 mg by mouth 3 times daily  famotidine (PEPCID) 20 MG tablet, Take 20 mg by mouth 2 times daily  multivitamin (OCUVITE) TABS per tablet, Take 1 tablet by mouth daily.  losartan (COZAAR) 25 MG tablet, Take 12.5 mg by mouth every morning 0.5 tablet daily   carvedilol (COREG) 6.25 MG tablet, Take 6.25 mg by mouth 2 times daily (with meals). Omega-3 Fatty Acids (FISH OIL) 1000 MG CAPS, Take 1,000 mg by mouth daily   aspirin 81 MG chewable tablet, Take 81 mg by mouth nightly   UNABLE TO FIND, uses Neuralgia cream  Twice a day for feet pain  alirocumab (PRALUENT) 75 MG/ML SOAJ injection pen, Inject 1 mL into the skin every 14 days  PRALUENT 75 MG/ML SOPN injection pen, INJECT 1ML INTO THE SKIN EVERY 14 DAYS  BRILINTA 90 MG TABS tablet, TAKE 1 TABLET TWICE A DAY  PRALUENT 75 MG/ML SOPN injection pen, Inject 1 mL into the skin every 14 days  nitroGLYCERIN (NITROSTAT) 0.4 MG SL tablet, Place 1 tablet under the tongue every 5 minutes as needed for Chest pain    Allergies:  Patient has no known allergies. Social History:   TOBACCO:   reports that he quit smoking about 54 years ago. His smoking use included cigarettes. He started smoking about 66 years ago. He has a 18.00 pack-year smoking history. He quit smokeless tobacco use about 54 years ago. His smokeless tobacco use included chew. ETOH:   reports no history of alcohol use. Family History:       Problem Relation Age of Onset    High Blood Pressure Mother     Heart Disease Father     Diabetes Brother        REVIEW OF SYSTEMS:  CONSTITUTIONAL:  Neg   fatigue,fever,chills  EYES:  Neg  acute change in vision  EARS:  Neg   hearing loss,tinnitus  NOSE:  Neg  rhinorrhea  MOUTH/THROAT:  Neg  sore throat. RESPIRATORY:   Neg SOB,wheeze,cough  CARDIOVASCULAR   Neg : chest pain,palpitations, or edema  GASTROINTESTINAL:  nausea,vomiting,denies diarrhea or abdominal pain. GENITOURINARY:  Neg  Urinary complaints  HEMATOLOGIC/LYMPHATIC:  Neg  Anemia,bleeding tendency  MUSCULOSKELETAL:  left knee pain   NEUROLOGICAL:  Neg  Loss of Consciousness. confusion,aphasia or dysarthria. SKIN :  Neg  rashes,sores.   PSYCHIATRIC:  Neg left knee arthroplasty (12/10/20) for end-stage OA of left knee: Performed by Dr. Darryl Rutherford. Pain is controlled on analgesics. PT/OT. Nausea: Antiemetics as needed. If patient tolerates p.o. diet, possible discharge tomorrow. Monitor closely. CAD: On ASA, Brilinta, statin, BP meds. HTN: CPM.  HLD: On statin. DM2: On Metformin at outpatient. Check A1c, POC glucose. DM neuropathy: On gabapentin as outpatient. DVT prophylaxis: On ASA, Brilinta, SCD, EDWIN hose. Plan:  Pending a.m. blood work. PT/OT. Continue analgesics. Continue home medications. Monitor labs and patient condition. Possible discharge tmrw if patient stable and tolerating oral diet. Home health upon discharge. Electronically signed by Keith Brown PA-C on 12/11/2020 at 8:01 AM   I have independently evaluated and examined this patient today. I have reviewed radiologic and biochemical tests on this patient. Management Plan is developed mutually with NIHARIKA Vega. I have reviewed above note and agree with assessment and plan.

## 2020-12-12 LAB
ALBUMIN SERPL-MCNC: 3.4 GM/DL (ref 3.4–5)
ALP BLD-CCNC: 67 IU/L (ref 40–128)
ALT SERPL-CCNC: 5 U/L (ref 10–40)
ANION GAP SERPL CALCULATED.3IONS-SCNC: 9 MMOL/L (ref 4–16)
AST SERPL-CCNC: 15 IU/L (ref 15–37)
BASOPHILS ABSOLUTE: 0 K/CU MM
BASOPHILS RELATIVE PERCENT: 0.2 % (ref 0–1)
BILIRUB SERPL-MCNC: 1 MG/DL (ref 0–1)
BUN BLDV-MCNC: 13 MG/DL (ref 6–23)
CALCIUM SERPL-MCNC: 8.1 MG/DL (ref 8.3–10.6)
CHLORIDE BLD-SCNC: 98 MMOL/L (ref 99–110)
CO2: 26 MMOL/L (ref 21–32)
CREAT SERPL-MCNC: 1 MG/DL (ref 0.9–1.3)
DIFFERENTIAL TYPE: ABNORMAL
EOSINOPHILS ABSOLUTE: 0.1 K/CU MM
EOSINOPHILS RELATIVE PERCENT: 0.5 % (ref 0–3)
GFR AFRICAN AMERICAN: >60 ML/MIN/1.73M2
GFR NON-AFRICAN AMERICAN: >60 ML/MIN/1.73M2
GLUCOSE BLD-MCNC: 177 MG/DL (ref 70–99)
GLUCOSE BLD-MCNC: 179 MG/DL (ref 70–99)
GLUCOSE BLD-MCNC: 180 MG/DL (ref 70–99)
HCT VFR BLD CALC: 32.3 % (ref 42–52)
HEMOGLOBIN: 10.7 GM/DL (ref 13.5–18)
IMMATURE NEUTROPHIL %: 0.3 % (ref 0–0.43)
LYMPHOCYTES ABSOLUTE: 0.9 K/CU MM
LYMPHOCYTES RELATIVE PERCENT: 9.3 % (ref 24–44)
MCH RBC QN AUTO: 30.3 PG (ref 27–31)
MCHC RBC AUTO-ENTMCNC: 33.1 % (ref 32–36)
MCV RBC AUTO: 91.5 FL (ref 78–100)
MONOCYTES ABSOLUTE: 1.1 K/CU MM
MONOCYTES RELATIVE PERCENT: 11.8 % (ref 0–4)
NUCLEATED RBC %: 0 %
PDW BLD-RTO: 13.1 % (ref 11.7–14.9)
PLATELET # BLD: 116 K/CU MM (ref 140–440)
PMV BLD AUTO: 9.3 FL (ref 7.5–11.1)
POTASSIUM SERPL-SCNC: 4.5 MMOL/L (ref 3.5–5.1)
RBC # BLD: 3.53 M/CU MM (ref 4.6–6.2)
SEGMENTED NEUTROPHILS ABSOLUTE COUNT: 7.1 K/CU MM
SEGMENTED NEUTROPHILS RELATIVE PERCENT: 77.9 % (ref 36–66)
SODIUM BLD-SCNC: 133 MMOL/L (ref 135–145)
TOTAL IMMATURE NEUTOROPHIL: 0.03 K/CU MM
TOTAL NUCLEATED RBC: 0 K/CU MM
TOTAL PROTEIN: 5.3 GM/DL (ref 6.4–8.2)
TROPONIN T: <0.01 NG/ML
TROPONIN T: <0.01 NG/ML
WBC # BLD: 9.1 K/CU MM (ref 4–10.5)

## 2020-12-12 PROCEDURE — 97116 GAIT TRAINING THERAPY: CPT

## 2020-12-12 PROCEDURE — 6360000002 HC RX W HCPCS: Performed by: ORTHOPAEDIC SURGERY

## 2020-12-12 PROCEDURE — 82962 GLUCOSE BLOOD TEST: CPT

## 2020-12-12 PROCEDURE — 97530 THERAPEUTIC ACTIVITIES: CPT

## 2020-12-12 PROCEDURE — 6370000000 HC RX 637 (ALT 250 FOR IP): Performed by: ORTHOPAEDIC SURGERY

## 2020-12-12 PROCEDURE — 97535 SELF CARE MNGMENT TRAINING: CPT

## 2020-12-12 PROCEDURE — 94761 N-INVAS EAR/PLS OXIMETRY MLT: CPT

## 2020-12-12 PROCEDURE — 97110 THERAPEUTIC EXERCISES: CPT

## 2020-12-12 PROCEDURE — 2580000003 HC RX 258: Performed by: ORTHOPAEDIC SURGERY

## 2020-12-12 PROCEDURE — 96376 TX/PRO/DX INJ SAME DRUG ADON: CPT

## 2020-12-12 PROCEDURE — G0378 HOSPITAL OBSERVATION PER HR: HCPCS

## 2020-12-12 PROCEDURE — 80053 COMPREHEN METABOLIC PANEL: CPT

## 2020-12-12 PROCEDURE — 94150 VITAL CAPACITY TEST: CPT

## 2020-12-12 PROCEDURE — 36415 COLL VENOUS BLD VENIPUNCTURE: CPT

## 2020-12-12 PROCEDURE — 85025 COMPLETE CBC W/AUTO DIFF WBC: CPT

## 2020-12-12 PROCEDURE — 84484 ASSAY OF TROPONIN QUANT: CPT

## 2020-12-12 RX ORDER — MORPHINE SULFATE 2 MG/ML
2 INJECTION, SOLUTION INTRAMUSCULAR; INTRAVENOUS EVERY 4 HOURS PRN
Status: DISCONTINUED | OUTPATIENT
Start: 2020-12-12 | End: 2020-12-14 | Stop reason: HOSPADM

## 2020-12-12 RX ADMIN — ROPINIROLE HYDROCHLORIDE 2 MG: 1 TABLET, FILM COATED ORAL at 22:53

## 2020-12-12 RX ADMIN — ATORVASTATIN CALCIUM 40 MG: 40 TABLET, FILM COATED ORAL at 09:14

## 2020-12-12 RX ADMIN — MAGNESIUM OXIDE 400 MG (241.3 MG MAGNESIUM) TABLET 200 MG: TABLET at 22:51

## 2020-12-12 RX ADMIN — ACETAMINOPHEN 650 MG: 325 TABLET ORAL at 18:14

## 2020-12-12 RX ADMIN — ONDANSETRON 4 MG: 2 INJECTION INTRAMUSCULAR; INTRAVENOUS at 09:41

## 2020-12-12 RX ADMIN — GABAPENTIN 100 MG: 100 CAPSULE ORAL at 13:34

## 2020-12-12 RX ADMIN — FAMOTIDINE 20 MG: 20 TABLET, FILM COATED ORAL at 09:14

## 2020-12-12 RX ADMIN — DOCUSATE SODIUM 50 MG AND SENNOSIDES 8.6 MG 1 TABLET: 8.6; 5 TABLET, FILM COATED ORAL at 09:14

## 2020-12-12 RX ADMIN — TICAGRELOR 60 MG: 60 TABLET ORAL at 09:14

## 2020-12-12 RX ADMIN — LOSARTAN POTASSIUM 12.5 MG: 25 TABLET, FILM COATED ORAL at 09:14

## 2020-12-12 RX ADMIN — SODIUM CHLORIDE, PRESERVATIVE FREE 10 ML: 5 INJECTION INTRAVENOUS at 22:54

## 2020-12-12 RX ADMIN — DOCUSATE SODIUM 50 MG AND SENNOSIDES 8.6 MG 1 TABLET: 8.6; 5 TABLET, FILM COATED ORAL at 22:53

## 2020-12-12 RX ADMIN — FAMOTIDINE 20 MG: 20 TABLET, FILM COATED ORAL at 22:53

## 2020-12-12 RX ADMIN — OMEGA-3-ACID ETHYL ESTERS 1000 MG: 1 CAPSULE, LIQUID FILLED ORAL at 09:14

## 2020-12-12 RX ADMIN — GABAPENTIN 100 MG: 100 CAPSULE ORAL at 09:14

## 2020-12-12 RX ADMIN — SODIUM CHLORIDE, POTASSIUM CHLORIDE, SODIUM LACTATE AND CALCIUM CHLORIDE: 600; 310; 30; 20 INJECTION, SOLUTION INTRAVENOUS at 09:13

## 2020-12-12 RX ADMIN — MAGNESIUM OXIDE 400 MG (241.3 MG MAGNESIUM) TABLET 200 MG: TABLET at 09:14

## 2020-12-12 RX ADMIN — CARVEDILOL 6.25 MG: 6.25 TABLET, FILM COATED ORAL at 18:14

## 2020-12-12 RX ADMIN — CARVEDILOL 6.25 MG: 6.25 TABLET, FILM COATED ORAL at 09:13

## 2020-12-12 RX ADMIN — SODIUM CHLORIDE, POTASSIUM CHLORIDE, SODIUM LACTATE AND CALCIUM CHLORIDE: 600; 310; 30; 20 INJECTION, SOLUTION INTRAVENOUS at 22:58

## 2020-12-12 RX ADMIN — ACETAMINOPHEN 650 MG: 325 TABLET ORAL at 13:34

## 2020-12-12 RX ADMIN — GABAPENTIN 100 MG: 100 CAPSULE ORAL at 22:54

## 2020-12-12 RX ADMIN — TICAGRELOR 60 MG: 60 TABLET ORAL at 22:56

## 2020-12-12 RX ADMIN — ASPIRIN 325 MG: 325 TABLET, COATED ORAL at 09:14

## 2020-12-12 RX ADMIN — ACETAMINOPHEN 650 MG: 325 TABLET ORAL at 06:11

## 2020-12-12 ASSESSMENT — PAIN DESCRIPTION - ORIENTATION: ORIENTATION: LEFT

## 2020-12-12 ASSESSMENT — PAIN SCALES - GENERAL
PAINLEVEL_OUTOF10: 5

## 2020-12-12 ASSESSMENT — PAIN DESCRIPTION - LOCATION: LOCATION: KNEE

## 2020-12-12 NOTE — PROGRESS NOTES
Jane Bebeto (1940)    Daily Progress Note- Trung Britt MD                        Hospital Day: 3     Today's Date   12/12/2020    Subjective:     Awake and alert this morning  Still having nausea and vomiting x3  Pain controlled and pain is perceived as moderate  Denies shortness of breath or chest pain. Objective:     No data found. I/O last 3 completed shifts:  In: -   Out: 500 [Urine:500]  DRAIN/TUBE OUTPUT:  Closed/Suction Drain Left; Anterior Knee Accordion 10 [place]-Output (ml): 120 ml    Physical Exam:   Orientation:  alert and oriented to person, place and time    Left Lower Extremity    Incision:  dressing in place, clean, dry and intact    Lower Extremity Motor :    Moving lower extremities without difficulty today. Able to dorsiflex and   plantar flex foot/ankle. Lower Extremity Sensory:   Neurovascularly intact to gross sensation and touch in lower extremities. Pulses:    present 2+ bilaterally lower extremities.       LABS   CBC:   Recent Labs     12/11/20  1321 12/12/20  0421   WBC 11.7* 9.1   HGB 12.3* 10.7*    116*     BMP:    Recent Labs     12/11/20  1321 12/12/20  0421   * 133*   K 4.3 4.5   CL 96* 98*   CO2 25 26   BUN 14 13   CREATININE 1.1 1.0   GLUCOSE 208* 177*         Medications   Meds:    losartan  12.5 mg Oral QAM    carvedilol  6.25 mg Oral BID WC    omega-3 acid ethyl esters  1,000 mg Oral Daily    famotidine  20 mg Oral BID    gabapentin  100 mg Oral TID    atorvastatin  40 mg Oral Daily    alirocumab  75 mg Subcutaneous Q14 Days    ticagrelor  60 mg Oral BID    rOPINIRole  2 mg Oral Nightly    magnesium oxide  200 mg Oral BID    tobramycin  2.4 g Intramuscular Once    sodium chloride flush  10 mL Intravenous 2 times per day    acetaminophen  650 mg Oral Q6H    sennosides-docusate sodium  1 tablet Oral BID    aspirin  325 mg Oral Daily       Assessment and Plan     POD#1 Left total knee arthroplasty. (12/11/20)    1:  Mobilize with Physical therapy-WBAT   -Work on ROM and quad strengthening  2:  Continue Deep venous thrombosis prophylaxis   -Resume ASA, Brilenta, SCD, EDWIN hose   -Mobility  3:  Continue Pain Control   -DC parental narcotics due to nausea  4. Unsafe for DC to to intractable nausea at this point.   5:  D/C Plan:  Planning on ARU if eligible       Nikolas Painter MD

## 2020-12-12 NOTE — CARE COORDINATION
Call received from [place] at the [employer] [facility] the medical director is requesting a Peer 2 Peer consult on this pt prior to determination. The provider can call Sat 12/12 or Sunday 12/13 until 430 pm EST to [phone] option 5. The P2P must be completed by noon EST on 12/14/2020.

## 2020-12-12 NOTE — PROGRESS NOTES
INTERNAL MEDICINE PROGRESS NOTE        Antonia Ann [id number]   1940   Primary Care Physician:  Karlie Ceballos MD  Admit Date: 12/10/2020     Subjective:   POD #2. Patient awake, sitting in chair. He is still having nausea with one emesis this a.m. Nausea is worse when he exerts himself and better when he rests. Denies any chest pain, abdominal pain, palpitations, shortness of breath, dizziness, abdominal pain. Knee pain is controlled on analgesics. Reports he had normal BM last night. Objective:   BP (!) 164/76   Pulse 91   Temp 98.2 °F (36.8 °C) (Oral)   Resp 16   Ht 5' 9\" (1.753 m)   Wt 239 lb (108.4 kg)   SpO2 94%   BMI 35.29 kg/m²    General appearance: alert, appears stated age and cooperative  Head: Normocephalic, without obvious abnormality, atraumatic  Neck: no adenopathy and supple, symmetrical, trachea midline  Lungs: clear to auscultation bilaterally  Heart: S1, S2 normal  Abdomen: soft, non-tender; bowel sounds normal; no masses,  no organomegaly  Extremities: no clubbing, cyanosis or edema. Left leg wrapped.   Neurologic: Grossly normal    Data Review  Lab Results   Component Value Date     (L) 12/12/2020    K 4.5 12/12/2020    CL 98 (L) 12/12/2020    CO2 26 12/12/2020    CREATININE 1.0 12/12/2020    BUN 13 12/12/2020    CALCIUM 8.1 (L) 12/12/2020     Lab Results   Component Value Date    WBC 9.1 12/12/2020    HGB 10.7 (L) 12/12/2020    HCT 32.3 (L) 12/12/2020    MCV 91.5 12/12/2020     (L) 12/12/2020     INR/Prothrombin Time      Meds:    losartan  12.5 mg Oral QAM    carvedilol  6.25 mg Oral BID WC    omega-3 acid ethyl esters  1,000 mg Oral Daily    famotidine  20 mg Oral BID    gabapentin  100 mg Oral TID    atorvastatin  40 mg Oral Daily    alirocumab  75 mg Subcutaneous Q14 Days    ticagrelor  60 mg Oral BID    rOPINIRole  2 mg Oral Nightly    magnesium oxide  200 mg Oral BID    tobramycin  2.4 g Intramuscular Once    sodium chloride flush  10 mL Intravenous 2 times per day    acetaminophen  650 mg Oral Q6H    sennosides-docusate sodium  1 tablet Oral BID    aspirin  325 mg Oral Daily     PRN Meds: nitroGLYCERIN, sodium chloride flush, HYDROmorphone **OR** HYDROmorphone, magnesium hydroxide, oxyCODONE, promethazine **OR** ondansetron    Assessment/Plan:   Patient Active Hospital Problem List:  Patient Active Problem List   Diagnosis    H/O cardiovascular stress test    CAD (coronary artery disease)    SOB (shortness of breath)    Hyperlipemia    S/P CABG x 3    HTN (hypertension)    S/P angioplasty    [facility] infarction) ([facility] [address] [facility]    Abnormal nuclear stress test    Chest pain    Unstable angina (HCC)    Angina at rest [facility])    Abnormal carotid ultrasound    Bilateral carotid artery disease ([facility] [address]    Stenosis of right carotid artery    History of left shoulder replacement    Arthritis of knee, left    Total knee replacement status, left     Assessment:  Nausea w/ exertion: Trend troponins. Continue antiemetics. Monitor closely. S/p total left knee arthroplasty (12/10/20) for end-stage OA of left knee: Performed by Dr. Faiza Piña. Pain is controlled on analgesics. PT/OT. Alban Julien CAD: On ASA, Brilinta, statin, BP meds. HTN: CPM.  HLD: On statin. DM2:  A1c 6.5. Monitor. DM neuropathy: On gabapentin. DVT prophylaxis: On ASA, Brilinta, SCD, EDWIN hose. Plan:  Trend troponins with nausea with exertion. Continue antiemetics. Pre-CERT approval denied  for ARU. Monitor labs and patient condition. Electronically signed by Honey Blanco PA-C on 12/12/2020 at 11:04 AM   I have independently evaluated and examined this patient today. I have reviewed radiologic and biochemical tests on this patient. Management Plan is developed mutually with NIHARIKA Agudelo. I have reviewed above note and agree with assessment and plan.

## 2020-12-12 NOTE — CARE COORDINATION
Notified Dr Shira Whelan of insurance denial for ARU. Informed him that the number to do a peer to peer is in the Mak/ARU's note.   TE

## 2020-12-12 NOTE — PROGRESS NOTES
Occupational Therapy  . Occupational Therapy Treatment Note  Name: Peg Morfin MRN: [id number] :   1940   Date:  2020   Admission Date: 12/10/2020 Room:  [address] [place], [place] [address]   Restrictions/Precautions:    General Precautions, Fall Risk, WBAT Lt LE    Communication with other providers:  Per chart review and Nurse [address] patient is appropriate for therapeutic intervention. Notified Nurse Ptarice Siddiqui of pt's decreased activity tolerance / diaphoresis during functional mobility and nurse performed blood sugar check, dispensed anti-nausea meds on return to room. Co-Tx c GUERO Skelton for safety. See PT note for additional details. Subjective:  Patient states:  \"I want to get up and walk. \" Pt reports problems c nausea (basin noted at bedside). Pain:   Location, Type, Intensity (0/10 to 10/10):  6/10, L knee, \"rises to 10/10 when I'm up. \"    Objective:    Observation:  Pt received seated on toilet c nurse tech on approach. Objective Measures:  Seated vitals checks after pt reported rising nausea, exhibited diaphoresis and decreased activity tolerance. /90, , O2 97% on room air (nurse notified)  Recheck after nurse performed blood sugar (180), showed decreased /72, , O2 98%. Treatment, including education:  Self Care Training:   Cues were given for safety, sequence, UE/LE placement, visual cues, and balance. Activities performed today included toilet transfer training / toileting. Toilet Transfer: Max A x2 c RW + cues for grab bar and safe body positioning, increased time and effort, postural cues. Toileting: Max A    Therapeutic Activity Training:   Therapeutic activity training was instructed today. Cues were given for safety, sequence, UE/LE placement, awareness, and balance. Activities performed today included bed mobility training, sup-sit, sit-stand, SPT.   Sit to stands (Initially Max A x2 from toilet), progressed to [facility] A x2 + cues for safe body positioning / increased time and effort. Stand Pivot Transfer: See Toilet Transfer  Functional Mobility: CGA x2 c RW + wheelchair follow, pt required increased time / postural cues and use of standing rest breaks for 50 ft and extended seated rest break and check by nursing (see above). Pt reported feeling better, BP reduced, and expressed motivation to perform one more trial for functional mobility, 75 ft. No adverse reactions. Standing balance / tolerance: CGA x1-2 c RW, multiple trials, 2-5 minutes. All therapeutic intervention performed c emphasis on dynamic balance / standing tolerance to inc strength, endurance and act tolerance for inc Indep c ADL tasks, func transfers / mobility. Safety  Patient safely in bedside chair + alarm at end of session, with call light/phone in reach, and nursing aware. Gait belt was used for func transfers / mobility. Emesis basin placed close by. Assessment / Impression:        Patient's tolerance of treatment:  Fair   Adverse Reaction: Diaphoresis / nausea   Significant change in status and impact:  Progressing mobility, managed nausea / diaphoresis c seated rest break. Barriers to improvement:  Pain, nausea, decreased balance / strength / endurance    Plan for Next Session:    Continue per OT POC per patient's tolerance    Time in:  1939  Time out:  0947  Timed treatment minutes:  42  Total treatment time:  42    Electronically signed by:    JAYJAY Duran  12/12/2020, 8:46 AM    Previously filed values:    Goals:  1. Pt will complete all aspects of bed mobility for EOB/OOB ADLs with SUP  2. Pt will complete UB/LB bathing with SBA, AE prn  3. Pt will complete all aspects of LB dressing SBA, AE prn  4. Pt will complete all functional transfers to and from bed, chair, toilet, shower chair with SBA and demo good safety awareness  5. Pt will ambulate HH distance to bathroom for toileting with SUP, FWW prn  6.  Pt will complete all aspects of toileting task with SBA  7.  Pt will complete oral hygiene/grooming routine in standing at sink with SUP and rest breaks as needed

## 2020-12-12 NOTE — FLOWSHEET NOTE
Physical Therapy Treatment Note  Name: Nando Tarango MRN: [id number] :   1940   Date:  2020   Admission Date: 12/10/2020 Room:  [address] [place], [place] [address]   Restrictions/Precautions:        WBAT left LE  Communication with other providers:  AM session: PT ok per RN Pascual Bumpers); Notified SOLEDAD Gresham) of patient reporting increased nausea/weakness and exhibiting diaphoresis. Also discussed vitals. Nsg checked blood sugar and it was 180. Co-treat with Nya Herring). See OT note for activity tolerance, safety, and ADL details. See PTA note for balance, transfers, and gait details. PM session: PT ok per RN Pascual Bumpers). Subjective:  Patient states: \"I want to get up and walk. \"   Pain:   Location, Type, Intensity (0/10 to 10/10): AM session: 6/10 (left knee); rises to 10/10 during gait training. PM session: 310 (left knee)  Objective:    Observation: AM session: pt seen sitting up on toilet at beginning of treatment. Agreeable to therapy. Pt left up in recliner with call light and chair alarm at end of treatment. PM session: pt seen in semi-campbell's position in bed at beginning of treatment. Agreeable to therapy. Pt left in semi-campbell's position in bed with call light and bed alarm at end of treatment. Treatment, including education/measures:  AM session:   Vitals (after gait training): Seated BP after gait training was 187/90. Pt with diaphoresis and complaints of nausea/weakness. HR was 106 bpm. O2 sats 97%. SOLEDAD Smyth checked blood sugar and it was 180. Pt reported feeling better after a few minutes. Diaphoresis subsided and patient requesting to walk again back towards the room. /72 and no adverse reactions. Transfers: toilet transfer: Max A x 2   Sit->stand from w/c and recliner Min A x 2   Stand->sit Min A x 2 (and required assist for positioning of left LE)    Gait: pt amb 12'+50'+75' CGA x 2 for safety with RW. Antalgic pattern; step-to pattern. Pt took a few standing rest breaks.      PM session:

## 2020-12-13 LAB
ANION GAP SERPL CALCULATED.3IONS-SCNC: 10 MMOL/L (ref 4–16)
BASOPHILS ABSOLUTE: 0 K/CU MM
BASOPHILS RELATIVE PERCENT: 0.2 % (ref 0–1)
BUN BLDV-MCNC: 12 MG/DL (ref 6–23)
CALCIUM SERPL-MCNC: 8.4 MG/DL (ref 8.3–10.6)
CHLORIDE BLD-SCNC: 99 MMOL/L (ref 99–110)
CO2: 28 MMOL/L (ref 21–32)
CREAT SERPL-MCNC: 1 MG/DL (ref 0.9–1.3)
DIFFERENTIAL TYPE: ABNORMAL
EOSINOPHILS ABSOLUTE: 0.1 K/CU MM
EOSINOPHILS RELATIVE PERCENT: 0.9 % (ref 0–3)
GFR AFRICAN AMERICAN: >60 ML/MIN/1.73M2
GFR NON-AFRICAN AMERICAN: >60 ML/MIN/1.73M2
GLUCOSE BLD-MCNC: 173 MG/DL (ref 70–99)
HCT VFR BLD CALC: 30.6 % (ref 42–52)
HEMOGLOBIN: 10.4 GM/DL (ref 13.5–18)
IMMATURE NEUTROPHIL %: 0.3 % (ref 0–0.43)
LYMPHOCYTES ABSOLUTE: 0.8 K/CU MM
LYMPHOCYTES RELATIVE PERCENT: 8.2 % (ref 24–44)
MCH RBC QN AUTO: 30.8 PG (ref 27–31)
MCHC RBC AUTO-ENTMCNC: 34 % (ref 32–36)
MCV RBC AUTO: 90.5 FL (ref 78–100)
MONOCYTES ABSOLUTE: 1 K/CU MM
MONOCYTES RELATIVE PERCENT: 11.2 % (ref 0–4)
NUCLEATED RBC %: 0 %
PDW BLD-RTO: 13 % (ref 11.7–14.9)
PLATELET # BLD: 115 K/CU MM (ref 140–440)
PMV BLD AUTO: 9.5 FL (ref 7.5–11.1)
POTASSIUM SERPL-SCNC: 4.3 MMOL/L (ref 3.5–5.1)
RBC # BLD: 3.38 M/CU MM (ref 4.6–6.2)
SEGMENTED NEUTROPHILS ABSOLUTE COUNT: 7.3 K/CU MM
SEGMENTED NEUTROPHILS RELATIVE PERCENT: 79.2 % (ref 36–66)
SODIUM BLD-SCNC: 137 MMOL/L (ref 135–145)
TOTAL IMMATURE NEUTOROPHIL: 0.03 K/CU MM
TOTAL NUCLEATED RBC: 0 K/CU MM
TROPONIN T: <0.01 NG/ML
WBC # BLD: 9.2 K/CU MM (ref 4–10.5)

## 2020-12-13 PROCEDURE — 36415 COLL VENOUS BLD VENIPUNCTURE: CPT

## 2020-12-13 PROCEDURE — 97530 THERAPEUTIC ACTIVITIES: CPT

## 2020-12-13 PROCEDURE — 6370000000 HC RX 637 (ALT 250 FOR IP): Performed by: ORTHOPAEDIC SURGERY

## 2020-12-13 PROCEDURE — 84484 ASSAY OF TROPONIN QUANT: CPT

## 2020-12-13 PROCEDURE — 94761 N-INVAS EAR/PLS OXIMETRY MLT: CPT

## 2020-12-13 PROCEDURE — 97116 GAIT TRAINING THERAPY: CPT

## 2020-12-13 PROCEDURE — G0378 HOSPITAL OBSERVATION PER HR: HCPCS

## 2020-12-13 PROCEDURE — 2580000003 HC RX 258: Performed by: ORTHOPAEDIC SURGERY

## 2020-12-13 PROCEDURE — 85025 COMPLETE CBC W/AUTO DIFF WBC: CPT

## 2020-12-13 PROCEDURE — 80048 BASIC METABOLIC PNL TOTAL CA: CPT

## 2020-12-13 PROCEDURE — 94150 VITAL CAPACITY TEST: CPT

## 2020-12-13 RX ADMIN — ACETAMINOPHEN 650 MG: 325 TABLET ORAL at 00:42

## 2020-12-13 RX ADMIN — ACETAMINOPHEN 650 MG: 325 TABLET ORAL at 11:59

## 2020-12-13 RX ADMIN — ROPINIROLE HYDROCHLORIDE 2 MG: 1 TABLET, FILM COATED ORAL at 21:41

## 2020-12-13 RX ADMIN — GABAPENTIN 100 MG: 100 CAPSULE ORAL at 21:40

## 2020-12-13 RX ADMIN — DOCUSATE SODIUM 50 MG AND SENNOSIDES 8.6 MG 1 TABLET: 8.6; 5 TABLET, FILM COATED ORAL at 09:08

## 2020-12-13 RX ADMIN — TICAGRELOR 60 MG: 60 TABLET ORAL at 09:08

## 2020-12-13 RX ADMIN — MAGNESIUM OXIDE 400 MG (241.3 MG MAGNESIUM) TABLET 200 MG: TABLET at 09:08

## 2020-12-13 RX ADMIN — SODIUM CHLORIDE, PRESERVATIVE FREE 10 ML: 5 INJECTION INTRAVENOUS at 21:41

## 2020-12-13 RX ADMIN — ACETAMINOPHEN 650 MG: 325 TABLET ORAL at 17:09

## 2020-12-13 RX ADMIN — ACETAMINOPHEN 650 MG: 325 TABLET ORAL at 07:10

## 2020-12-13 RX ADMIN — DOCUSATE SODIUM 50 MG AND SENNOSIDES 8.6 MG 1 TABLET: 8.6; 5 TABLET, FILM COATED ORAL at 21:41

## 2020-12-13 RX ADMIN — MAGNESIUM OXIDE 400 MG (241.3 MG MAGNESIUM) TABLET 200 MG: TABLET at 21:41

## 2020-12-13 RX ADMIN — FAMOTIDINE 20 MG: 20 TABLET, FILM COATED ORAL at 09:09

## 2020-12-13 RX ADMIN — FAMOTIDINE 20 MG: 20 TABLET, FILM COATED ORAL at 21:41

## 2020-12-13 RX ADMIN — CARVEDILOL 6.25 MG: 6.25 TABLET, FILM COATED ORAL at 17:09

## 2020-12-13 RX ADMIN — ASPIRIN 325 MG: 325 TABLET, COATED ORAL at 09:08

## 2020-12-13 RX ADMIN — GABAPENTIN 100 MG: 100 CAPSULE ORAL at 15:05

## 2020-12-13 RX ADMIN — LOSARTAN POTASSIUM 12.5 MG: 25 TABLET, FILM COATED ORAL at 09:08

## 2020-12-13 RX ADMIN — GABAPENTIN 100 MG: 100 CAPSULE ORAL at 09:08

## 2020-12-13 RX ADMIN — OXYCODONE HYDROCHLORIDE 5 MG: 5 TABLET ORAL at 00:43

## 2020-12-13 RX ADMIN — ATORVASTATIN CALCIUM 40 MG: 40 TABLET, FILM COATED ORAL at 09:08

## 2020-12-13 RX ADMIN — CARVEDILOL 6.25 MG: 6.25 TABLET, FILM COATED ORAL at 09:09

## 2020-12-13 RX ADMIN — TICAGRELOR 60 MG: 60 TABLET ORAL at 21:41

## 2020-12-13 ASSESSMENT — PAIN SCALES - GENERAL
PAINLEVEL_OUTOF10: 5
PAINLEVEL_OUTOF10: 3
PAINLEVEL_OUTOF10: 4
PAINLEVEL_OUTOF10: 5
PAINLEVEL_OUTOF10: 0
PAINLEVEL_OUTOF10: 0

## 2020-12-13 NOTE — PROGRESS NOTES
Starke Holiday (1940)    Daily Progress Note- Katy Red MD                        Hospital Day: 4     Today's Date   12/13/2020    Subjective:     Feeling better this am  No nausea and vomiting today or last night  Pain controlled and pain is perceived as moderate  Denies shortness of breath or chest pain. Objective:     Patient Vitals for the past 4 hrs:   BP Temp Temp src Pulse Resp SpO2   12/13/20 0820 (!) 160/74 98.6 °F (37 °C) Oral 82 16 97 %     I/O last 3 completed shifts:  In: -   Out: 875 [Urine:775; Drains:100]  DRAIN/TUBE OUTPUT:  Closed/Suction Drain Left; Anterior Knee Accordion 10 Ghanaian-Output (ml): 100 ml    Physical Exam:   Orientation:  alert and oriented to person, place and time    Left Lower Extremity    Incision:  dressing in place, clean, dry and intact    Lower Extremity Motor :    Moving lower extremities without difficulty today. Able to dorsiflex and   plantar flex foot/ankle. Lower Extremity Sensory:   Neurovascularly intact to gross sensation and touch in lower extremities. Pulses:    present 2+ bilaterally lower extremities.       LABS   CBC:   Recent Labs     12/11/20  1321 12/12/20  0421 12/13/20  0139   WBC 11.7* 9.1 9.2   HGB 12.3* 10.7* 10.4*    116* 115*     BMP:    Recent Labs     12/11/20  1321 12/12/20  0421 12/13/20  0139   * 133* 137   K 4.3 4.5 4.3   CL 96* 98* 99   CO2 25 26 28   BUN 14 13 12   CREATININE 1.1 1.0 1.0   GLUCOSE 208* 177* 173*         Medications   Meds:    losartan  12.5 mg Oral QAM    carvedilol  6.25 mg Oral BID WC    omega-3 acid ethyl esters  1,000 mg Oral Daily    famotidine  20 mg Oral BID    gabapentin  100 mg Oral TID    atorvastatin  40 mg Oral Daily    alirocumab  75 mg Subcutaneous Q14 Days    ticagrelor  60 mg Oral BID    rOPINIRole  2 mg Oral Nightly    magnesium oxide  200 mg Oral BID    tobramycin  2.4 g Intramuscular Once    sodium chloride flush  10 mL Intravenous 2 times per day    acetaminophen  650 mg Oral Q6H    sennosides-docusate sodium  1 tablet Oral BID    aspirin  325 mg Oral Daily       Assessment and Plan     POD#3 Left total knee arthroplasty. (12/10/20)    1:  Mobilize with Physical therapy-WBAT   -Work on ROM and quad strengthening   -Still requires max assist for walking and transfers  2:  Continue Deep venous thrombosis prophylaxis   -Resume ASA, Brilenta, SCD, EDWIN hose   -Mobility  3:  DC IV fluid and hemovac  4.   Slow to mobilize with PT  5:  D/C Plan:  Planning on ARU if eligible       Isidro Goltz, MD

## 2020-12-13 NOTE — PROGRESS NOTES
INTERNAL MEDICINE PROGRESS NOTE        Lester Hernandez KJOZXOB   1940   Primary Care Physician:  Maricruz Ruiz MD  Admit Date: 12/10/2020     Subjective:   POD #3. Patient requires max assist to go to bathroom, when he walks in hallway, requires help of two people. He does not have as much nausea as yesterday. He is on iv fluid and b/o that he has to go to bathroom a lot    Objective:   BP (!) 160/74   Pulse 82   Temp 98.6 °F (37 °C) (Oral)   Resp 16   Ht 5' 9\" (1.753 m)   Wt 239 lb (108.4 kg)   SpO2 96%   BMI 35.29 kg/m²    General appearance: alert, appears stated age and cooperative  Head: Normocephalic, without obvious abnormality, atraumatic  Neck: no adenopathy and supple, symmetrical, trachea midline  Lungs: clear to auscultation bilaterally  Heart: S1, S2 normal  Abdomen: soft, non-tender; bowel sounds normal; no masses,  no organomegaly  Extremities: no clubbing, cyanosis or edema. Left leg wrapped.   Neurologic: Grossly normal    Data Review  Lab Results   Component Value Date     12/13/2020    K 4.3 12/13/2020    CL 99 12/13/2020    CO2 28 12/13/2020    CREATININE 1.0 12/13/2020    BUN 12 12/13/2020    CALCIUM 8.4 12/13/2020     Lab Results   Component Value Date    WBC 9.2 12/13/2020    HGB 10.4 (L) 12/13/2020    HCT 30.6 (L) 12/13/2020    MCV 90.5 12/13/2020     (L) 12/13/2020     INR/Prothrombin Time      Meds:    losartan  12.5 mg Oral QAM    carvedilol  6.25 mg Oral BID WC    omega-3 acid ethyl esters  1,000 mg Oral Daily    famotidine  20 mg Oral BID    gabapentin  100 mg Oral TID    atorvastatin  40 mg Oral Daily    alirocumab  75 mg Subcutaneous Q14 Days    ticagrelor  60 mg Oral BID    rOPINIRole  2 mg Oral Nightly    magnesium oxide  200 mg Oral BID    tobramycin  2.4 g Intramuscular Once    sodium chloride flush  10 mL Intravenous 2 times per day    acetaminophen  650 mg Oral Q6H    sennosides-docusate sodium  1 tablet Oral BID    aspirin  325 mg Oral Daily     PRN Meds: morphine, nitroGLYCERIN, sodium chloride flush, magnesium hydroxide, oxyCODONE, promethazine **OR** ondansetron    Assessment/Plan:   Patient Active Hospital Problem List:  Patient Active Problem List   Diagnosis    H/O cardiovascular stress test    CAD (coronary artery disease)    SOB (shortness of breath)    Hyperlipemia    S/P CABG x 3    HTN (hypertension)    S/P angioplasty    [facility] infarction) [facility] [address]    Abnormal nuclear stress test    Chest pain    Unstable angina (HCC)    Angina at rest [facility])    Abnormal carotid ultrasound    Bilateral carotid artery disease ([facility] [address]    Stenosis of right carotid artery    History of left shoulder replacement    Arthritis of knee, left    Total knee replacement status, left     Assessment:  Nausea w/ exertion:  Troponin negative. It is improved now  S/p total left knee arthroplasty (12/10/20) for end-stage OA of left knee: will benefit from Going to ARU  CAD: On ASA, Brilinta, statin, BP meds. HTN: CPM.  HLD: On statin. DM2:  A1c 6.5. Monitor. DM neuropathy: On gabapentin. DVT prophylaxis: On ASA, Brilinta, SCD, EDWIN hose.     Plan:  Left message for Peer to Peer Discussion  Continue present management  Continue pt/ot   Continue supportive care  Stop iv fluid    Electronically signed by Maru Duncan MD on 12/13/2020 at 2:18 PM

## 2020-12-13 NOTE — PROGRESS NOTES
degeneration)  Occupation: Retired  Leisure & Hobbies: Fishing, Making fishing hooks  Short term goals  Time Frame for [employer] term goals: 1 week  Short term goal 1: Pt will perform sit><supine SBA  Short term goal 2: Pt will transition sit><stand SBA  Short term goal 3: Pt will transfer between surfaces SBA  Short term goal 4: Pt will ambulate 100ft with RW SBA  Short term goal 5: Pt will ascend/descend 3 steps, 1-2 rails Zoraida       Electronically signed by:    Todd Angela, PT  12/13/2020, 12:52 PM

## 2020-12-13 NOTE — PROGRESS NOTES
Based on  My Peer to Peer discussion with [employer] Physician: Joint replacement is not a qualifying diagnosis for  ARU placement per Medicare Guideline and will not approve for Acute Rehab. If Skilled nursing facility is required.  New request needs to be made

## 2020-12-14 VITALS
RESPIRATION RATE: 16 BRPM | HEIGHT: 69 IN | SYSTOLIC BLOOD PRESSURE: 183 MMHG | DIASTOLIC BLOOD PRESSURE: 84 MMHG | OXYGEN SATURATION: 97 % | TEMPERATURE: 97.7 F | HEART RATE: 94 BPM | WEIGHT: 239 LBS | BODY MASS INDEX: 35.4 KG/M2

## 2020-12-14 PROCEDURE — 2580000003 HC RX 258: Performed by: ORTHOPAEDIC SURGERY

## 2020-12-14 PROCEDURE — 6360000002 HC RX W HCPCS: Performed by: ORTHOPAEDIC SURGERY

## 2020-12-14 PROCEDURE — 97110 THERAPEUTIC EXERCISES: CPT

## 2020-12-14 PROCEDURE — 97116 GAIT TRAINING THERAPY: CPT

## 2020-12-14 PROCEDURE — 97530 THERAPEUTIC ACTIVITIES: CPT

## 2020-12-14 PROCEDURE — G0378 HOSPITAL OBSERVATION PER HR: HCPCS

## 2020-12-14 PROCEDURE — 6370000000 HC RX 637 (ALT 250 FOR IP): Performed by: ORTHOPAEDIC SURGERY

## 2020-12-14 PROCEDURE — 96376 TX/PRO/DX INJ SAME DRUG ADON: CPT

## 2020-12-14 PROCEDURE — 94761 N-INVAS EAR/PLS OXIMETRY MLT: CPT

## 2020-12-14 RX ORDER — HYDROCODONE BITARTRATE AND ACETAMINOPHEN 5; 325 MG/1; MG/1
1 TABLET ORAL EVERY 6 HOURS PRN
Qty: 12 TABLET | Refills: 0 | Status: SHIPPED | OUTPATIENT
Start: 2020-12-14 | End: 2020-12-17

## 2020-12-14 RX ADMIN — GABAPENTIN 100 MG: 100 CAPSULE ORAL at 08:40

## 2020-12-14 RX ADMIN — ONDANSETRON 4 MG: 2 INJECTION INTRAMUSCULAR; INTRAVENOUS at 04:10

## 2020-12-14 RX ADMIN — MAGNESIUM OXIDE 400 MG (241.3 MG MAGNESIUM) TABLET 200 MG: TABLET at 08:40

## 2020-12-14 RX ADMIN — LOSARTAN POTASSIUM 12.5 MG: 25 TABLET, FILM COATED ORAL at 08:40

## 2020-12-14 RX ADMIN — ASPIRIN 325 MG: 325 TABLET, COATED ORAL at 08:41

## 2020-12-14 RX ADMIN — CARVEDILOL 6.25 MG: 6.25 TABLET, FILM COATED ORAL at 08:41

## 2020-12-14 RX ADMIN — ATORVASTATIN CALCIUM 40 MG: 40 TABLET, FILM COATED ORAL at 08:41

## 2020-12-14 RX ADMIN — TICAGRELOR 60 MG: 60 TABLET ORAL at 08:41

## 2020-12-14 RX ADMIN — FAMOTIDINE 20 MG: 20 TABLET, FILM COATED ORAL at 08:40

## 2020-12-14 RX ADMIN — SODIUM CHLORIDE, PRESERVATIVE FREE 10 ML: 5 INJECTION INTRAVENOUS at 08:45

## 2020-12-14 RX ADMIN — DOCUSATE SODIUM 50 MG AND SENNOSIDES 8.6 MG 1 TABLET: 8.6; 5 TABLET, FILM COATED ORAL at 08:41

## 2020-12-14 ASSESSMENT — PAIN SCALES - GENERAL: PAINLEVEL_OUTOF10: 0

## 2020-12-14 NOTE — PROGRESS NOTES
Assistance: Independent(uses no AD)  Transfer Assistance: Independent  Active : No(due to macular degeneration)  Occupation: Retired  Leisure & Hobbies: Fishing, Making fishing hooks  Short term goals  Time Frame for Short term goals: 1 week  Short term goal 1: Pt will perform sit><supine SBA  Short term goal 2: Pt will transition sit><stand SBA  Short term goal 3: Pt will transfer between surfaces SBA  Short term goal 4: Pt will ambulate 100ft with RW SBA  Short term goal 5: Pt will ascend/descend 3 steps, 1-2 rails Zoraida   Electronically signed by:     Maribel Fonseca  12/14/2020, 9:27 AM

## 2020-12-14 NOTE — CARE COORDINATION
VERONICA was notified by nursing that pt will need a rolling walker and a bedside commode at discharge. CM spoke with Roxann Solitario at [facility] for DME needs. Delivery set up for tomorrow to pt home. Spoke with pt primary RN, Rafaela, and instructed her to discharge pt with [facility] walker and the  from [facility] will  and return to [facility] when he delivers the pts walker tomorrow. Electronically signed by Rosalinda Mccullough RN on 12/14/2020 at 12:04 PM     Gianna Khan was evaluated today and a DME order was entered for a wheeled walker because he requires this to successfully complete daily living tasks of eating, bathing, toileting, personal cares, ambulating, grooming and hygiene. A wheeled walker is necessary due to the patient's unsteady gait, upper body weakness, and inability to  an ambulation device; and he can ambulate only by pushing a walker instead of a lesser assistive device such as a cane, crutch, or standard walker. The need for this equipment was discussed with the patient and he understands and is in agreement. Gianna Khan requires a bedside commode due to being confined to a single room, and is physically incapable of utilizing regular toilet facilities. Current body weight is Weight: 239 lb (108.4 kg).

## 2020-12-14 NOTE — PROGRESS NOTES
INTERNAL MEDICINE PROGRESS NOTE        Monse Tinoco [id number]   1940   Primary Care Physician:  Deyanira Phillips MD  Admit Date: 12/10/2020     Subjective:   POD #4. Patient awake and doing okay. Had some nausea yesterday, but doing better today. Tolerates breakfast without issue. Pain is controlled. Drain has been removed. Patient was denied at ARU. Patient wants to go home with home therapy. Objective:   BP (!) 183/84   Pulse 94   Temp 97.7 °F (36.5 °C) (Oral)   Resp 16   Ht 5' 9\" (1.753 m)   Wt 239 lb (108.4 kg)   SpO2 97%   BMI 35.29 kg/m²    General appearance: alert, appears stated age and cooperative  Head: Normocephalic, without obvious abnormality, atraumatic  Neck: no adenopathy and supple, symmetrical, trachea midline  Lungs: clear to auscultation bilaterally  Heart: S1, S2 normal  Abdomen: soft, non-tender; bowel sounds normal; no masses,  no organomegaly  Extremities: no clubbing, cyanosis or edema. Incision covered.   Neurologic: Grossly normal    Data Review  Lab Results   Component Value Date     12/13/2020    K 4.3 12/13/2020    CL 99 12/13/2020    CO2 28 12/13/2020    CREATININE 1.0 12/13/2020    BUN 12 12/13/2020    CALCIUM 8.4 12/13/2020     Lab Results   Component Value Date    WBC 9.2 12/13/2020    HGB 10.4 (L) 12/13/2020    HCT 30.6 (L) 12/13/2020    MCV 90.5 12/13/2020     (L) 12/13/2020     INR/Prothrombin Time      Meds:    losartan  12.5 mg Oral QAM    carvedilol  6.25 mg Oral BID WC    omega-3 acid ethyl esters  1,000 mg Oral Daily    famotidine  20 mg Oral BID    gabapentin  100 mg Oral TID    atorvastatin  40 mg Oral Daily    alirocumab  75 mg Subcutaneous Q14 Days    ticagrelor  60 mg Oral BID    rOPINIRole  2 mg Oral Nightly    magnesium oxide  200 mg Oral BID    tobramycin  2.4 g Intramuscular Once    sodium chloride flush  10 mL Intravenous 2 times per day    acetaminophen  650 mg Oral Q6H    sennosides-docusate sodium  1 tablet Oral BID    aspirin  325 mg Oral Daily     PRN Meds: morphine, nitroGLYCERIN, sodium chloride flush, magnesium hydroxide, oxyCODONE, promethazine **OR** ondansetron    Assessment/Plan:   Patient Active Hospital Problem List:  Patient Active Problem List   Diagnosis    H/O cardiovascular stress test    CAD (coronary artery disease)    SOB (shortness of breath)    Hyperlipemia    S/P CABG x 3    HTN (hypertension)    S/P angioplasty    [facility]) [facility] [address]    Abnormal nuclear stress test    Chest pain    Unstable angina (HCC)    Angina at rest [facility])    Abnormal carotid ultrasound    Bilateral carotid artery disease ([facility] [address]    Stenosis of right carotid artery    History of left shoulder replacement    Arthritis of knee, left    Total knee replacement status, left     Assessment:  Nausea w/ exertion:  Troponin negative. It is improved now. S/p total left knee arthroplasty (12/10/20) for end-stage OA of left knee: denied at ARU. Home with home therapy. CAD: On ASA, Brilinta, statin, BP meds. HTN: CPM.  HLD: On statin. DM2:  A1c 6.5. Monitor. DM neuropathy: On gabapentin. DVT prophylaxis: On ASA, Brilinta, SCD, EDWIN hose. Plan:  D/w ortho, patient medically stable and ready for discharge. Discharge home with with home pt/ot. Asa 325mg x 2wks, then resume 81mg daily dose. F/u with PCP and ortho as outpatient within two weeks. Stable from medical standpoint, will sign off. Electronically signed by Yves Avendano PA-C on 12/14/2020 at 9:34 AM   I have independently evaluated and examined this patient today. I have reviewed radiologic and biochemical tests on this patient. Management Plan is developed mutually with NIHARIKA Dodson. I have reviewed above note and agree with assessment and plan.

## 2020-12-14 NOTE — PROGRESS NOTES
Discharge instructions reviewed with patient, questions answered. Discharge instructions and prescriptions given to patient. Patient states his wife will be picking him up. Patient to take hospital supplied walker home today until his personal walker arrives at home tomorrow.

## 2020-12-14 NOTE — PROGRESS NOTES
[facility] Liaison spoke w/pt & is aware of discharge & will initiate [facility].

## 2020-12-14 NOTE — DISCHARGE SUMMARY
DISCHARGE SUMMARY:  Steph Barros MD     Date of Admission:      12/10/2020  Date of Discharge:     12/14/20    Admission Diagnosis   Total knee replacement status, left [Z96.652]   Discharge Diagnosis   Same    Procedure:    Left Total knee replacement 12/10/2020    Consultations:  IP CONSULT TO HOME CARE NEEDS  IP CONSULT TO INTERNAL MEDICINE  IP CONSULT TO HOME CARE NEEDS    Brief history and hospital stay. The Eduin Bhatia is a 79 y.o. male underwent total knee replacement procedure without complication. Eduin Bhatia was admitted to the floor following surgery. Patient was admitted to the floor and appropriate consults (Dr. Aguilar Gabriel obtained as outlined in progress notes. The patients diet was advanced as tolerated per recommendations. The patient remained neurovascularly intact in the lower extremity and had intact pulses distally. Postoperatively was complicated by intractable nausea and vomiting for 48 hours. Medication was changed and he felt better. Patients calf remained soft and showed no evidence of DVT. The patient was able to move their affected extremity without any problems as their hospital course progressed. Physical therapy and occupational therapy were consulted and began working with the patient during their stay. The patient progressed with PT/OT as would be expected and continued to improve through their stay. Hemoglobin was monitored and DVT prophylaxis was given. The patients pain was controlled with the use off pain medications soon after arrival to the floor and their pain remained under good control through their hospital stay. From a medical standpoint the patient remained stable and continued to have the medicine team follow throughout their stay.       The patient will be discharged at this time to Home with ome PT/OT  with their current diet restrictions and will continue to follow the precautions outlined to them by us and PT/OT. He initially wanted to go to the ER you harbors insurance and had no coverage. Condition on Discharge: Stable    Medications     Andrzej Bautista   Home Medication Instructions [id number]    Printed on:12/14/20 1790   Medication Information                      aspirin 325 MG EC tablet  Take 1 tablet by mouth daily             atorvastatin (LIPITOR) 40 MG tablet  Take 1 tablet by mouth daily             BRILINTA 90 MG TABS tablet  TAKE 1 TABLET TWICE A DAY             carvedilol (COREG) 6.25 MG tablet  Take 6.25 mg by mouth 2 times daily (with meals). famotidine (PEPCID) 20 MG tablet  Take 20 mg by mouth 2 times daily             gabapentin (NEURONTIN) 100 MG capsule  Take 100 mg by mouth 3 times daily             HYDROcodone-acetaminophen (NORCO) 5-325 MG per tablet  Take 1 tablet by mouth every 6 hours as needed for Pain for up to 3 days. Intended supply: 3 days. Take lowest dose possible to manage pain             losartan (COZAAR) 25 MG tablet  Take 12.5 mg by mouth every morning 0.5 tablet daily              magnesium oxide (MAG-OX) 400 MG tablet  Take 250 mg by mouth 2 times daily             metFORMIN (GLUCOPHAGE) 500 MG tablet  Take 500 mg by mouth daily (with breakfast)             multivitamin (OCUVITE) TABS per tablet  Take 1 tablet by mouth daily.              nitroGLYCERIN (NITROSTAT) 0.4 MG SL tablet  Place 1 tablet under the tongue every 5 minutes as needed for Chest pain             Omega-3 Fatty Acids (FISH OIL) 1000 MG CAPS  Take 1,000 mg by mouth daily              PRALUENT 75 MG/ML SOPN injection pen  Inject 1 mL into the skin every 14 days             rOPINIRole (REQUIP XL) 2 MG extended release tablet  Take 1 mg by mouth nightly             UNABLE TO FIND  uses Neuralgia cream  Twice a day for feet pain                   Plan  A discharge packet was given to the patient with post-operative instructions, Rx for pain medications as well as DVT prophylaxis. Plan is to follow up in 14 days for suture removal.  Patient was instructed on the use of pain medications, the signs and symptoms of infection, and was given our number to call should they have any questions or concerns following discharge.     Braxton Foley MD

## 2020-12-14 NOTE — CARE COORDINATION
Peer to peer completed and denial was upheld. [employer] [facility] Medicare) is recommending a lower level of care. Would defer to plan B. none

## 2020-12-14 NOTE — PROGRESS NOTES
Hemovac removed per order. Patient tolerated well. Pressure held on removal site. Dry sterile dressing placed over removal site. Patient denies further needs.

## 2021-03-12 ENCOUNTER — HOSPITAL ENCOUNTER (OUTPATIENT)
Dept: LAB | Age: 81
Discharge: HOME | End: 2021-03-12
Payer: MEDICARE

## 2021-03-12 LAB
SARS-COV-2: NOT DETECTED
SOURCE: NORMAL

## 2021-03-12 PROCEDURE — U0002 COVID-19 LAB TEST NON-CDC: HCPCS

## 2021-03-12 PROCEDURE — C9803 HOPD COVID-19 SPEC COLLECT: HCPCS

## 2021-03-12 NOTE — PROGRESS NOTES
Surgery 03/17/21 @ 0830 arrive @ 0715               1. Do not eat or drink anything after midnight - unless instructed by your doctor prior to surgery. This includes                   no water, chewing gum or mints. 2. Follow your directions as prescribed by the doctor for your procedure and medications. Take Coreg, Pepcid, and Metformin morning of with a sip. 3. Check with your Doctor regarding stopping Plavix, Coumadin, Lovenox,Effient,Pradaxa,Xarelto, Fragmin or other blood thinners and                   follow their instructions. Last dose of Brilinta 03/12/21. 4. Do not smoke, and do not drink any alcoholic beverages 24 hours prior to surgery. This includes NA Beer. 5. You may brush your teeth and gargle the morning of surgery. DO NOT SWALLOW WATER   6. You MUST make arrangements for a responsible adult to take you home after your surgery and be able to check on you every couple                   hours for the day. You will not be allowed to leave alone or drive yourself home. It is strongly suggested someone stay with you the first 24                   hrs. Your surgery will be cancelled if you do not have a ride home. 7. Please wear simple, loose fitting clothing to the hospital.  Reino Apley not bring valuables (money, credit cards, checkbooks, etc.) Do not wear any                   makeup (including no eye makeup) or nail polish on your fingers or toes. 8. DO NOT wear any jewelry or piercings on day of surgery. All body piercing jewelry must be removed. 9. If you have dentures, they will be removed before going to the OR; we will provide you a container. If you wear contact lenses or glasses,                  they will be removed; please bring a case for them. 10. If you  have a Living Will and Durable Power of  for Healthcare, please bring in a copy.            11. Please bring picture ID,  insurance card, paperwork from the doctors office    (H & P, Consent,

## 2021-03-14 NOTE — H&P
Original H &P in soft chart. I have examined the patient immediately before the procedure and there is no change in the previous history and physical exam, which has been reviewed. There is no history of sleep apnea, snoring, or stridor. There has been no  previous adverse experience with sedation/anesthesia. There is no increased risk for aspiration of gastric contents. The patient has been instructed that all resuscitative measures (during the operative and immediate perioperative period) will be instituted in the unlikely event that they will be needed. The patient has no pertinent past surgical or family history other than listed in the original H&P. The patient was counseled about the risks of thien Covid-19 during their perioperative period and any recovery window from their procedure. The patient was made aware that thien Covid-19  may worsen their prognosis for recovering from their procedure  and lend to a higher morbidity and/or mortality risk. All material risks, benefits, and reasonable alternatives including postponing the procedure were discussed. The patient does wish to proceed with the procedure at this time.     ASA Class: 3  AIRWAY Class: 1

## 2021-03-15 ENCOUNTER — ANESTHESIA EVENT (OUTPATIENT)
Dept: OPERATING ROOM | Age: 81
End: 2021-03-15
Payer: MEDICARE

## 2021-03-15 NOTE — ANESTHESIA PRE PROCEDURE
Department of Anesthesiology  Preprocedure Note       Name:  Estella ENRIQUEZ   Age:  [de-identified] y.o.  :  1940                                          MRN:  [id number]         Date:  3/15/2021      Surgeon: Karo Navarro):  Beba Person MD    Procedure: Procedure(s):  EGD DIAGNOSTIC ONLY    Medications prior to admission:   Prior to Admission medications    Medication Sig Start Date End Date Taking? Authorizing Provider   aspirin 325 MG EC tablet Take 1 tablet by mouth daily Take 1 tab by mouth daily for 2 weeks, then resume 81mg dose daily. 20  Yes Adi Almeida PA-C   metFORMIN (GLUCOPHAGE) 500 MG tablet Take 500 mg by mouth daily (with breakfast)   Yes Historical Provider, MD   rOPINIRole (REQUIP XL) 2 MG extended release tablet Take 1 mg by mouth nightly   Yes Historical Provider, MD   BRILINTA 90 MG TABS tablet TAKE 1 TABLET TWICE A DAY 3/1/18  Yes Elvis Giles MD   PRALUENT 75 MG/ML SOPN injection pen Inject 1 mL into the skin every 14 days 2/3/17 4/1/21 Yes Elvis Giles MD   nitroGLYCERIN (NITROSTAT) 0.4 MG SL tablet Place 1 tablet under the tongue every 5 minutes as needed for Chest pain 16  Yes Elvis Giles MD   atorvastatin (LIPITOR) 40 MG tablet Take 1 tablet by mouth daily  Patient taking differently: Take 40 mg by mouth every morning  16  Yes Elvis Giles MD   gabapentin (NEURONTIN) 100 MG capsule Take 100 mg by mouth 3 times daily   Yes Historical Provider, MD   famotidine (PEPCID) 20 MG tablet Take 20 mg by mouth 2 times daily   Yes Historical Provider, MD   multivitamin (OCUVITE) TABS per tablet Take 1 tablet by mouth daily. Yes Historical Provider, MD   losartan (COZAAR) 25 MG tablet Take 12.5 mg by mouth every morning 0.5 tablet daily    Yes Historical Provider, MD   carvedilol (COREG) 6.25 MG tablet Take 6.25 mg by mouth 2 times daily (with meals).    Yes Historical Provider, MD   UNABLE TO FIND uses Neuralgia cream  Twice a day for feet pain    Historical Provider, MD   magnesium oxide (MAG-OX) 400 MG tablet Take 250 mg by mouth 2 times daily    Historical Provider, MD   Omega-3 Fatty Acids (FISH OIL) 1000 MG CAPS Take 1,000 mg by mouth daily     Historical Provider, MD       Current medications:    No current facility-administered medications for this encounter. Current Outpatient Medications   Medication Sig Dispense Refill    aspirin 325 MG EC tablet Take 1 tablet by mouth daily Take 1 tab by mouth daily for 2 weeks, then resume 81mg dose daily. 14 tablet 0    metFORMIN (GLUCOPHAGE) 500 MG tablet Take 500 mg by mouth daily (with breakfast)      rOPINIRole (REQUIP XL) 2 MG extended release tablet Take 1 mg by mouth nightly      BRILINTA 90 MG TABS tablet TAKE 1 TABLET TWICE A DAY 90 tablet 3    PRALUENT 75 MG/ML SOPN injection pen Inject 1 mL into the skin every 14 days 6 Pen 2    nitroGLYCERIN (NITROSTAT) 0.4 MG SL tablet Place 1 tablet under the tongue every 5 minutes as needed for Chest pain 25 tablet 2    atorvastatin (LIPITOR) 40 MG tablet Take 1 tablet by mouth daily (Patient taking differently: Take 40 mg by mouth every morning ) 90 tablet 0    gabapentin (NEURONTIN) 100 MG capsule Take 100 mg by mouth 3 times daily      famotidine (PEPCID) 20 MG tablet Take 20 mg by mouth 2 times daily      multivitamin (OCUVITE) TABS per tablet Take 1 tablet by mouth daily.  losartan (COZAAR) 25 MG tablet Take 12.5 mg by mouth every morning 0.5 tablet daily       carvedilol (COREG) 6.25 MG tablet Take 6.25 mg by mouth 2 times daily (with meals).       UNABLE TO FIND uses Neuralgia cream  Twice a day for feet pain      magnesium oxide (MAG-OX) 400 MG tablet Take 250 mg by mouth 2 times daily      Omega-3 Fatty Acids (FISH OIL) 1000 MG CAPS Take 1,000 mg by mouth daily          Allergies:  No Known Allergies    Problem List:    Patient Active Problem List   Diagnosis Code    H/O cardiovascular stress test Z92.89    CAD (coronary artery disease) I25.10    SOB (shortness of breath) R06.02    Hyperlipemia E78.5    S/P CABG x 3 Z95.1    HTN (hypertension) I10    S/P angioplasty Z98.62    MI (myocardial infarction) (HCC) I21.9    Abnormal nuclear stress test R94.39    Chest pain R07.9    Unstable angina (HCC) I20.0    Angina at rest [facility]) I20.8    Abnormal carotid ultrasound R93.89    Bilateral carotid artery disease (HCC) I77.9    Stenosis of right carotid artery I65.21    History of left shoulder replacement Z96.612    Arthritis of knee, left M17.12    Total knee replacement status, left A03.979       Past Medical History:        Diagnosis Date    Abnormal angiogram 10/04/2016    carotid - mod stenosis on right    Arthritis     Left shoulder, left foot    Bulging of lumbar intervertebral disc     x3    CAD (coronary artery disease)     Dr. Satya Young about 4 or 5 heart stents- never got any papers on that\"    Diabetes mellitus ([facility] [address]     Type II - Follows with PCP\"just started me on Metformin 5 months ago\" per pt on 12/3/2020    Foot drop, left foot     hx drop foot left - \"from my sciatic nerve but I exercised it and now dont really bother me no more\"    Full dentures     upper and lower    H/O chest pain 10/2016    Last chest pain: 12/2019    H/O echocardiogram 10/23/2019    EF 45-50%, Mild MR. Hypokinesis of the Basal infero-septal segment.  Hearing aid worn     bilateral/[place] both ears    History of nuclear stress test 10/23/2019    perfusion scan shows moderate size, severe intensity, non reversible perfusion defect in inferior wall suggesting inferior wall infarction.     History of PTCA 10/04/2016    ptca svg-diag    HTN (hypertension)     Follows with PCP    Hx of carotid artery stenosis     Hyperlipemia     Macular degeneration     Bilateral eyes (worse in right)- uses magnifying glass to read    MI (myocardial infarction) ([facility] [address] 2006, nov 2016    sees Dax rees\"they said at the end of the heart cath had heart attack when they were pulling the wires out in \"    Restless leg syndrome     Takes Gabapentin    Sciatic nerve pain, left 10/2019    leg    Sleep apnea     Uses CPAP    Wears glasses        Past Surgical History:        Procedure Laterality Date    CAROTID ENDARTERECTOMY Right 2016    COLONOSCOPY      Normal exam per pt - Dr. Eliseo Austin  10/2016    10/2016, 16, ,  7 stents total from 4 caths in 2016 = 6 stents    CORONARY ARTERY BYPASS GRAFT  11/27/2006    x 3    ELBOW FRACTURE SURGERY Left     no hardware    EYE SURGERY Bilateral 2013   [address]    from 3030-5257 had 6 hernia surgies, unable to recall even estimates of years    SHOULDER ARTHROPLASTY Left 2020    SHOULDER TOTAL ARTHROPLASTY REVERSE performed by Sha Gallagher MD at [facility]  [address]   [facility] Left 12/10/2020    LEFT KNEE TOTAL ARTHROPLASTY performed by Sha Gallagher MD at [address] [place] [address] OR       Social History:    Social History     Tobacco Use    Smoking status: Former Smoker     Packs/day: 1.50     Years: 12.00     Pack years: 18.00     Types: Cigarettes     Start date: 1954     Quit date: 1966     Years since quittin.2    Smokeless tobacco: Former User     Types: Chew     Quit date: 1966   Substance Use Topics    Alcohol use: No     Alcohol/week: 0.0 standard drinks     Comment: CAFFEINE 1 POT OF COFFEE A DAY                                Counseling given: Not Answered      Vital Signs (Current):   Vitals:    21 1004   Weight: 250 lb (113.4 kg)   Height: 6' 1\" (1.854 m)                                              BP Readings from Last 3 Encounters:   20 (!) 183/84   12/10/20 124/76   20 (!) 141/71       NPO Status:                                                                                 BMI:   Wt Readings from Last 3 Encounters: 21 238 lb (108 kg)   12/10/20 239 lb (108.4 kg)   20 239 lb (108.4 kg)     Body mass index is 32.98 kg/m². CBC:   Lab Results   Component Value Date    WBC 9.2 2020    RBC 3.38 2020    HGB 10.4 2020    HCT 30.6 2020    MCV 90.5 2020    RDW 13.0 2020     2020       CMP:   Lab Results   Component Value Date     2020    K 4.3 2020    CL 99 2020    CO2 28 2020    BUN 12 2020    CREATININE 1.0 2020    GFRAA >60 2020    LABGLOM >60 2020    GLUCOSE 173 2020    PROT 5.3 2020    CALCIUM 8.4 2020    BILITOT 1.0 2020    ALKPHOS 67 2020    AST 15 2020    ALT 5 2020       POC Tests: No results for input(s): POCGLU, POCNA, POCK, POCCL, POCBUN, POCHEMO, POCHCT in the last 72 hours.     Coags:   Lab Results   Component Value Date    PROTIME 11.2 2018    PROTIME 11.4 2011    INR 0.98 2018    APTT 29.5 2018       HCG (If Applicable): No results found for: PREGTESTUR, PREGSERUM, HCG, HCGQUANT     ABGs: No results found for: PHART, PO2ART, WDA6OHW, SOS5YWS, BEART, [id number]     Type & Screen (If Applicable):  No results found for: LABABO, LABRH    Drug/Infectious Status (If Applicable):  No results found for: HIV, HEPCAB    COVID-19 Screening (If Applicable):   Lab Results   Component Value Date    COVID19 NOT DETECTED 2021           Anesthesia Evaluation  Patient summary reviewed  Airway: Mallampati: II        Dental:    (+) edentulous      Pulmonary:   (+) shortness of breath:  sleep apnea: on CPAP,                            ROS comment: Former Smoker - 18 pack years  Quit Smokin66       Cardiovascular:  Exercise tolerance: good (>4 METS),   (+) hypertension:, angina: at rest, past MI: > 6 months, CAD:, CABG/stent (S/P CABG x 3; History of PTCA ptca svg-diag ;  Dr. Venkat Everett about 4 or 5 heart stents- never got any papers on that\"):,

## 2021-03-17 ENCOUNTER — ANESTHESIA (OUTPATIENT)
Dept: OPERATING ROOM | Age: 81
End: 2021-03-17
Payer: MEDICARE

## 2021-03-17 ENCOUNTER — HOSPITAL ENCOUNTER (OUTPATIENT)
Age: 81
Setting detail: OUTPATIENT SURGERY
Discharge: HOME | End: 2021-03-17
Attending: SPECIALIST | Admitting: SPECIALIST
Payer: MEDICARE

## 2021-03-17 VITALS
RESPIRATION RATE: 18 BRPM | OXYGEN SATURATION: 100 % | SYSTOLIC BLOOD PRESSURE: 184 MMHG | DIASTOLIC BLOOD PRESSURE: 88 MMHG

## 2021-03-17 VITALS
HEART RATE: 71 BPM | SYSTOLIC BLOOD PRESSURE: 159 MMHG | HEIGHT: 73 IN | OXYGEN SATURATION: 98 % | TEMPERATURE: 97.3 F | BODY MASS INDEX: 33.13 KG/M2 | DIASTOLIC BLOOD PRESSURE: 72 MMHG | RESPIRATION RATE: 16 BRPM | WEIGHT: 250 LBS

## 2021-03-17 LAB — GLUCOSE BLD-MCNC: 153 MG/DL (ref 70–99)

## 2021-03-17 PROCEDURE — 6370000000 HC RX 637 (ALT 250 FOR IP): Performed by: SPECIALIST

## 2021-03-17 PROCEDURE — 2709999900 HC NON-CHARGEABLE SUPPLY: Performed by: SPECIALIST

## 2021-03-17 PROCEDURE — 7100000011 HC PHASE II RECOVERY - ADDTL 15 MIN: Performed by: SPECIALIST

## 2021-03-17 PROCEDURE — 2580000003 HC RX 258: Performed by: SPECIALIST

## 2021-03-17 PROCEDURE — 3609017700 HC EGD DILATION GASTRIC/DUODENAL STRICTURE: Performed by: SPECIALIST

## 2021-03-17 PROCEDURE — 3700000001 HC ADD 15 MINUTES (ANESTHESIA): Performed by: SPECIALIST

## 2021-03-17 PROCEDURE — 82962 GLUCOSE BLOOD TEST: CPT

## 2021-03-17 PROCEDURE — 6360000002 HC RX W HCPCS: Performed by: NURSE ANESTHETIST, CERTIFIED REGISTERED

## 2021-03-17 PROCEDURE — 7100000010 HC PHASE II RECOVERY - FIRST 15 MIN: Performed by: SPECIALIST

## 2021-03-17 PROCEDURE — 3700000000 HC ANESTHESIA ATTENDED CARE: Performed by: SPECIALIST

## 2021-03-17 PROCEDURE — C1726 CATH, BAL DIL, NON-VASCULAR: HCPCS | Performed by: SPECIALIST

## 2021-03-17 RX ORDER — PROPOFOL 10 MG/ML
INJECTION, EMULSION INTRAVENOUS PRN
Status: DISCONTINUED | OUTPATIENT
Start: 2021-03-17 | End: 2021-03-17 | Stop reason: SDUPTHER

## 2021-03-17 RX ORDER — LIDOCAINE HYDROCHLORIDE 20 MG/ML
INJECTION, SOLUTION INTRAVENOUS PRN
Status: DISCONTINUED | OUTPATIENT
Start: 2021-03-17 | End: 2021-03-17 | Stop reason: SDUPTHER

## 2021-03-17 RX ORDER — SODIUM CHLORIDE, SODIUM LACTATE, POTASSIUM CHLORIDE, CALCIUM CHLORIDE 600; 310; 30; 20 MG/100ML; MG/100ML; MG/100ML; MG/100ML
INJECTION, SOLUTION INTRAVENOUS CONTINUOUS
Status: DISCONTINUED | OUTPATIENT
Start: 2021-03-17 | End: 2021-03-17 | Stop reason: HOSPADM

## 2021-03-17 RX ORDER — IPRATROPIUM BROMIDE AND ALBUTEROL SULFATE 2.5; .5 MG/3ML; MG/3ML
1 SOLUTION RESPIRATORY (INHALATION)
Status: DISCONTINUED | OUTPATIENT
Start: 2021-03-17 | End: 2021-03-17 | Stop reason: HOSPADM

## 2021-03-17 RX ADMIN — PROPOFOL 20 MG: 10 INJECTION, EMULSION INTRAVENOUS at 09:36

## 2021-03-17 RX ADMIN — LIDOCAINE HYDROCHLORIDE 50 MG: 20 INJECTION, SOLUTION INTRAVENOUS at 09:35

## 2021-03-17 RX ADMIN — PROPOFOL 50 MG: 10 INJECTION, EMULSION INTRAVENOUS at 09:35

## 2021-03-17 RX ADMIN — IPRATROPIUM BROMIDE AND ALBUTEROL SULFATE 1 AMPULE: .5; 3 SOLUTION RESPIRATORY (INHALATION) at 08:30

## 2021-03-17 RX ADMIN — LIDOCAINE HYDROCHLORIDE 50 MG: 20 INJECTION, SOLUTION INTRAVENOUS at 09:36

## 2021-03-17 RX ADMIN — SODIUM CHLORIDE, POTASSIUM CHLORIDE, SODIUM LACTATE AND CALCIUM CHLORIDE: 600; 310; 30; 20 INJECTION, SOLUTION INTRAVENOUS at 08:28

## 2021-03-17 ASSESSMENT — ENCOUNTER SYMPTOMS: SHORTNESS OF BREATH: 1

## 2021-03-17 ASSESSMENT — PAIN SCALES - GENERAL
PAINLEVEL_OUTOF10: 0
PAINLEVEL_OUTOF10: 0

## 2021-03-17 ASSESSMENT — PAIN - FUNCTIONAL ASSESSMENT: PAIN_FUNCTIONAL_ASSESSMENT: 0-10

## 2021-03-17 NOTE — PROGRESS NOTES
9496 Patient transferred from GI lab to [facility] via cart, he is awake and is resting quietly, patient has a warm blanket on left lower arm , from his iv infiltrating, area is soft, mild swelling noted. 0016 Dr Kinney Gain in to talk to patient and his wife. 1000 Patient has a small amount of swelling on inner  side of  of arm other area of arm  was soft, no swelling noted. 1 Patient's wife \"states that he is having trouble moving his fingers on left hand. \" patient is sitting up holding his coffee cup in his left hand and appears to be having no problem, holding cup. 1038 Patient and his wife given home instructions, explained to patient he should put a warm moist cloth on his arm when he got home and he stated\"that he would do that. 7581 Patient discharged to home, his wife will drive him. Will call Patient later this afternoon and check on him.

## 2021-04-08 ENCOUNTER — APPOINTMENT (OUTPATIENT)
Dept: GENERAL RADIOLOGY | Age: 81
DRG: 310 | End: 2021-04-08
Payer: MEDICARE

## 2021-04-08 ENCOUNTER — HOSPITAL ENCOUNTER (INPATIENT)
Age: 81
LOS: 2 days | Discharge: HOME | DRG: 310 | End: 2021-04-10
Attending: EMERGENCY MEDICINE | Admitting: INTERNAL MEDICINE
Payer: MEDICARE

## 2021-04-08 ENCOUNTER — TELEPHONE (OUTPATIENT)
Dept: CARDIOLOGY CLINIC | Age: 81
End: 2021-04-08

## 2021-04-08 DIAGNOSIS — I21.4 NSTEMI (NON-ST ELEVATED MYOCARDIAL INFARCTION) (HCC): ICD-10-CM

## 2021-04-08 DIAGNOSIS — R07.9 ACUTE CHEST PAIN: ICD-10-CM

## 2021-04-08 DIAGNOSIS — I50.9 ACUTE ON CHRONIC CONGESTIVE HEART FAILURE, UNSPECIFIED HEART FAILURE TYPE (HCC): ICD-10-CM

## 2021-04-08 DIAGNOSIS — I47.1 PAROXYSMAL SUPRAVENTRICULAR TACHYCARDIA (HCC): Primary | ICD-10-CM

## 2021-04-08 LAB
ALBUMIN SERPL-MCNC: 3.9 GM/DL (ref 3.4–5)
ALP BLD-CCNC: 129 IU/L (ref 40–129)
ALT SERPL-CCNC: 10 U/L (ref 10–40)
ANION GAP SERPL CALCULATED.3IONS-SCNC: 10 MMOL/L (ref 4–16)
AST SERPL-CCNC: 24 IU/L (ref 15–37)
BASOPHILS ABSOLUTE: 0 K/CU MM
BASOPHILS RELATIVE PERCENT: 0.3 % (ref 0–1)
BILIRUB SERPL-MCNC: 0.6 MG/DL (ref 0–1)
BUN BLDV-MCNC: 25 MG/DL (ref 6–23)
CALCIUM SERPL-MCNC: 8.9 MG/DL (ref 8.3–10.6)
CHLORIDE BLD-SCNC: 102 MMOL/L (ref 99–110)
CO2: 24 MMOL/L (ref 21–32)
CREAT SERPL-MCNC: 1.3 MG/DL (ref 0.9–1.3)
DIFFERENTIAL TYPE: ABNORMAL
EKG ATRIAL RATE: 141 BPM
EKG DIAGNOSIS: NORMAL
EKG Q-T INTERVAL: 302 MS
EKG QRS DURATION: 88 MS
EKG QTC CALCULATION (BAZETT): 466 MS
EKG R AXIS: -9 DEGREES
EKG T AXIS: 22 DEGREES
EKG VENTRICULAR RATE: 143 BPM
EOSINOPHILS ABSOLUTE: 0.1 K/CU MM
EOSINOPHILS RELATIVE PERCENT: 1.6 % (ref 0–3)
GFR AFRICAN AMERICAN: >60 ML/MIN/1.73M2
GFR NON-AFRICAN AMERICAN: 53 ML/MIN/1.73M2
GLUCOSE BLD-MCNC: 189 MG/DL (ref 70–99)
HCT VFR BLD CALC: 48.4 % (ref 42–52)
HEMOGLOBIN: 16.1 GM/DL (ref 13.5–18)
IMMATURE NEUTROPHIL %: 0.1 % (ref 0–0.43)
LYMPHOCYTES ABSOLUTE: 1.7 K/CU MM
LYMPHOCYTES RELATIVE PERCENT: 19.7 % (ref 24–44)
MCH RBC QN AUTO: 29.2 PG (ref 27–31)
MCHC RBC AUTO-ENTMCNC: 33.3 % (ref 32–36)
MCV RBC AUTO: 87.7 FL (ref 78–100)
MONOCYTES ABSOLUTE: 1 K/CU MM
MONOCYTES RELATIVE PERCENT: 11.5 % (ref 0–4)
NUCLEATED RBC %: 0 %
PDW BLD-RTO: 13.3 % (ref 11.7–14.9)
PLATELET # BLD: 213 K/CU MM (ref 140–440)
PMV BLD AUTO: 10.2 FL (ref 7.5–11.1)
POTASSIUM SERPL-SCNC: 4.4 MMOL/L (ref 3.5–5.1)
PRO-BNP: 756.8 PG/ML
RBC # BLD: 5.52 M/CU MM (ref 4.6–6.2)
SEGMENTED NEUTROPHILS ABSOLUTE COUNT: 5.9 K/CU MM
SEGMENTED NEUTROPHILS RELATIVE PERCENT: 66.8 % (ref 36–66)
SODIUM BLD-SCNC: 136 MMOL/L (ref 135–145)
TOTAL IMMATURE NEUTOROPHIL: 0.01 K/CU MM
TOTAL NUCLEATED RBC: 0 K/CU MM
TOTAL PROTEIN: 6.2 GM/DL (ref 6.4–8.2)
TROPONIN T: 0.01 NG/ML
TROPONIN T: 0.02 NG/ML
WBC # BLD: 8.8 K/CU MM (ref 4–10.5)

## 2021-04-08 PROCEDURE — 6370000000 HC RX 637 (ALT 250 FOR IP): Performed by: EMERGENCY MEDICINE

## 2021-04-08 PROCEDURE — 84484 ASSAY OF TROPONIN QUANT: CPT

## 2021-04-08 PROCEDURE — 96376 TX/PRO/DX INJ SAME DRUG ADON: CPT

## 2021-04-08 PROCEDURE — 36591 DRAW BLOOD OFF VENOUS DEVICE: CPT

## 2021-04-08 PROCEDURE — 80053 COMPREHEN METABOLIC PANEL: CPT

## 2021-04-08 PROCEDURE — 2140000000 HC CCU INTERMEDIATE R&B

## 2021-04-08 PROCEDURE — 36592 COLLECT BLOOD FROM PICC: CPT

## 2021-04-08 PROCEDURE — 96365 THER/PROPH/DIAG IV INF INIT: CPT

## 2021-04-08 PROCEDURE — 85025 COMPLETE CBC W/AUTO DIFF WBC: CPT

## 2021-04-08 PROCEDURE — 99285 EMERGENCY DEPT VISIT HI MDM: CPT

## 2021-04-08 PROCEDURE — 96366 THER/PROPH/DIAG IV INF ADDON: CPT

## 2021-04-08 PROCEDURE — 2500000003 HC RX 250 WO HCPCS: Performed by: EMERGENCY MEDICINE

## 2021-04-08 PROCEDURE — 93005 ELECTROCARDIOGRAM TRACING: CPT | Performed by: EMERGENCY MEDICINE

## 2021-04-08 PROCEDURE — 94761 N-INVAS EAR/PLS OXIMETRY MLT: CPT

## 2021-04-08 PROCEDURE — 71046 X-RAY EXAM CHEST 2 VIEWS: CPT

## 2021-04-08 PROCEDURE — 93010 ELECTROCARDIOGRAM REPORT: CPT | Performed by: INTERNAL MEDICINE

## 2021-04-08 PROCEDURE — 36415 COLL VENOUS BLD VENIPUNCTURE: CPT

## 2021-04-08 PROCEDURE — 83880 ASSAY OF NATRIURETIC PEPTIDE: CPT

## 2021-04-08 RX ORDER — ASPIRIN 81 MG/1
324 TABLET, CHEWABLE ORAL ONCE
Status: COMPLETED | OUTPATIENT
Start: 2021-04-08 | End: 2021-04-08

## 2021-04-08 RX ORDER — FAMOTIDINE 20 MG/1
20 TABLET, FILM COATED ORAL 2 TIMES DAILY
Status: CANCELLED | OUTPATIENT
Start: 2021-04-08

## 2021-04-08 RX ORDER — DILTIAZEM HYDROCHLORIDE 5 MG/ML
10 INJECTION INTRAVENOUS ONCE
Status: COMPLETED | OUTPATIENT
Start: 2021-04-08 | End: 2021-04-08

## 2021-04-08 RX ADMIN — ASPIRIN 81 MG CHEWABLE TABLET 324 MG: 81 TABLET CHEWABLE at 13:28

## 2021-04-08 RX ADMIN — DEXTROSE MONOHYDRATE 5 MG/HR: 50 INJECTION, SOLUTION INTRAVENOUS at 11:41

## 2021-04-08 RX ADMIN — DILTIAZEM HYDROCHLORIDE 10 MG: 5 INJECTION INTRAVENOUS at 11:37

## 2021-04-08 ASSESSMENT — PAIN SCALES - GENERAL
PAINLEVEL_OUTOF10: 0

## 2021-04-08 ASSESSMENT — PAIN DESCRIPTION - PAIN TYPE: TYPE: ACUTE PAIN

## 2021-04-08 NOTE — ED NOTES
1837 paged hospitalist for Rn. Fabiola Olivares  04/08/21 Esmer Henry returned call.       Fabiola Olivares  04/08/21 5819

## 2021-04-08 NOTE — ED NOTES
Prachi Maria Del Carmen, [de-identified]year old male present to the ED with his wife via wheelchair. Per patient he has a substantial cardiac history. Patient currently having symptoms of CP, SOB, Dizziness and palpitations. Patient states his Cp started yesterday while fishing. Patient states he started to get clammy and dizzy/lighthead. Patient states that his pain started in the left neck region and went down into his mid chest. Patients vitals are completed, EKG completed and  seen patient in triage. Joshua Krishnamurthy.  Tona  04/08/21 0901

## 2021-04-08 NOTE — TELEPHONE ENCOUNTER
Spoke with wife, patient in background. Patient complains of chest pain with radiation to his neck. SOB and dizziness. Similar to previous MI symptoms.  Advised to go to ED

## 2021-04-08 NOTE — ED PROVIDER NOTES
Emergency Department Encounter    Patient: Antonia Robles  MRN: [id number]  : 1940  Date of Evaluation: 2021  ED Provider:  Sheila Burnham    Triage Chief Complaint:   Chest Pain      [place]:  Antonia Robles is a [de-identified] y.o. male that presents to the emergency department for evaluation of chest pain. Patient presents with his wife who is at bedside and provides additional history. Patient has substantial cardiac history with multiple cardiac stents and open heart surgery. Over the past 24 hours, patient has developed chest pain and shortness of breath. He describes a pressure sensation in the middle of his chest.  Pain started yesterday while fishing and has been persistent since. Earlier this morning, he felt clammy and lightheaded. Felt his heart rate was racing. Pain began radiating to his neck which prompted visit to the emergency department. Currently, denies syncope. Does admit to dizziness which she describes as a lightheaded sensation. No ataxia or gait instability. No double vision, blurred vision, change in vision. Flashes or floaters. No tinnitus, buzzing, ringing in the ears. No facial droop, slurred speech, aphasia, dysphagia. No abdominal pain, nausea, vomiting. No hematochezia or melena. Patient has been diaphoretic and feels clammy. Denies additional precipitating, modifying, alleviating features. ROS - see HPI, below listed is current ROS at time of my eval:  A complete 14 point review of systems was performed and is as dictated above, otherwise negative.      Past Medical History:   Diagnosis Date    Abnormal angiogram 10/04/2016    carotid - mod stenosis on right    Arthritis     Left shoulder, left foot    Bulging of lumbar intervertebral disc     x3    CAD (coronary artery disease)     Dr. Juan J Wise about 4 or 5 heart stents- never got any papers on that\"    Diabetes mellitus ([facility] [address]     Type II - Follows with PCP\"just started me on Metformin 5 months ago\" per pt on 12/3/2020    Foot drop, left foot     hx drop foot left - \"from my sciatic nerve but I exercised it and now dont really bother me no more\"    Full dentures     upper and lower    H/O chest pain 10/2016    Last chest pain: 12/2019    H/O echocardiogram 10/23/2019    EF 45-50%, Mild MR. Hypokinesis of the Basal infero-septal segment.  Hearing aid worn     bilateral/[place] both ears    History of nuclear stress test 10/23/2019    perfusion scan shows moderate size, severe intensity, non reversible perfusion defect in inferior wall suggesting inferior wall infarction.     History of PTCA 10/04/2016    ptca svg-diag    HTN (hypertension)     Follows with PCP    Hx of carotid artery stenosis     Hyperlipemia     Macular degeneration     Bilateral eyes (worse in right)- uses magnifying glass to read    MI (myocardial infarction) ([facility] [address] 2006, nov 2016    sees Darin Galeazzi mohammed\"they said at the end of the heart cath had heart attack when they were pulling the wires out in 2016\"    Restless leg syndrome     Takes Gabapentin    Sciatic nerve pain, left 10/2019    leg    Sleep apnea     Uses CPAP    Wears glasses        Past Surgical History:   Procedure Laterality Date    CAROTID ENDARTERECTOMY Right 12/14/2016    COLONOSCOPY  2007    Normal exam per pt - Dr. Eduardo Aquino  10/2016    10/2016, 2/12/16, 5/16, 8/16 7 stents total from 4 caths in 2016 = 6 stents    CORONARY ARTERY BYPASS GRAFT  11/27/2006    x 3    ELBOW FRACTURE SURGERY Left 1996    no hardware    ENDOSCOPY, COLON, DIAGNOSTIC  03/17/2021    small hiatal hernia, esophagal ring, dilatation t18-20 mm    EYE SURGERY Bilateral 2013   [address]    from [id number] had 6 hernia surgies, unable to recall even estimates of years    SHOULDER ARTHROPLASTY Left 1/31/2020    SHOULDER TOTAL ARTHROPLASTY REVERSE performed by Lian Dutton MD at [address] [place] [address] 1950's    TOTAL KNEE ARTHROPLASTY Left 12/10/2020    LEFT KNEE TOTAL ARTHROPLASTY performed by Fe Alexander MD at [address] N/A 3/17/2021    EGD DILATION BALLOON 18-20 BALLOON DILATED TO 20  performed by Jono Pizarro MD at [facility] 12       Family History   Problem Relation Age of Onset    High Blood Pressure Mother     Heart Disease Father     Diabetes Brother        Social History     Socioeconomic History    Marital status:      Spouse name: Not on file    Number of children: Not on file    Years of education: Not on file    Highest education level: Not on file   Occupational History    Not on file   Social Needs    Financial resource strain: Not on file    Food insecurity     Worry: Not on file     Inability: Not on file    Transportation needs     Medical: Not on file     Non-medical: Not on file   Tobacco Use    Smoking status: Former Smoker     Packs/day: 1.50     Years: 12.00     Pack years: 18.00     Types: Cigarettes     Start date: 1954     Quit date: 1966     Years since quittin.4    Smokeless tobacco: Former User     Types: Chew     Quit date: 1966   Substance and Sexual Activity    Alcohol use: No     Alcohol/week: 0.0 standard drinks     Comment: CAFFEINE 1 POT OF COFFEE A DAY    Drug use: No    Sexual activity: Yes     Partners: Female     Comment:    Lifestyle    Physical activity     Days per week: Not on file     Minutes per session: Not on file    Stress: Not on file   Relationships    Social connections     Talks on phone: Not on file     Gets together: Not on file     Attends Hoahaoism service: Not on file     Active member of club or organization: Not on file     Attends meetings of clubs or organizations: Not on file     Relationship status: Not on file    Intimate partner violence     Fear of current or ex partner: Not on file     Emotionally abused: Not on file     Physically abused: Not on file     Forced sexual activity: Not on file   Other Topics Concern    Not on file   Social History Narrative    Not on file       Current Facility-Administered Medications   Medication Dose Route Frequency Provider Last Rate Last Admin    dilTIAZem 100 mg in dextrose 5 % 100 mL infusion (ADD-[place])  5-15 mg/hr Intravenous Continuous Tal Luke Couch DO 5 mL/hr at 04/08/21 1141 5 mg/hr at 04/08/21 1141    aspirin chewable tablet 324 mg  324 mg Oral Once Tal NITA Monroe, DO         Current Outpatient Medications   Medication Sig Dispense Refill    aspirin 325 MG EC tablet Take 1 tablet by mouth daily Take 1 tab by mouth daily for 2 weeks, then resume 81mg dose daily. 14 tablet 0    metFORMIN (GLUCOPHAGE) 500 MG tablet Take 500 mg by mouth daily (with breakfast)      UNABLE TO FIND uses Neuralgia cream  Twice a day for feet pain      rOPINIRole (REQUIP XL) 2 MG extended release tablet Take 1 mg by mouth nightly      magnesium oxide (MAG-OX) 400 MG tablet Take 250 mg by mouth 2 times daily      BRILINTA 90 MG TABS tablet TAKE 1 TABLET TWICE A DAY 90 tablet 3    PRALUENT 75 MG/ML SOPN injection pen Inject 1 mL into the skin every 14 days 6 Pen 2    nitroGLYCERIN (NITROSTAT) 0.4 MG SL tablet Place 1 tablet under the tongue every 5 minutes as needed for Chest pain 25 tablet 2    atorvastatin (LIPITOR) 40 MG tablet Take 1 tablet by mouth daily (Patient taking differently: Take 40 mg by mouth every morning ) 90 tablet 0    gabapentin (NEURONTIN) 100 MG capsule Take 100 mg by mouth 3 times daily      famotidine (PEPCID) 20 MG tablet Take 20 mg by mouth 2 times daily      multivitamin (OCUVITE) TABS per tablet Take 1 tablet by mouth daily.  losartan (COZAAR) 25 MG tablet Take 12.5 mg by mouth every morning 0.5 tablet daily       carvedilol (COREG) 6.25 MG tablet Take 6.25 mg by mouth 2 times daily (with meals).       Omega-3 Fatty Acids (FISH OIL) 1000 MG CAPS Take 1,000 mg by mouth daily          No testing is equal and symmetric bilaterally. NIH stroke scale score 0.              Psychiatric:  Appropriate    *I have reviewed and interpreted all of the currently available results from this visit*    Labs  Results for orders placed or performed during the hospital encounter of 04/08/21   CBC Auto Differential   Result Value Ref Range    WBC 8.8 4.0 - 10.5 K/CU MM    RBC 5.52 4.6 - 6.2 M/CU MM    Hemoglobin 16.1 13.5 - 18.0 GM/DL    Hematocrit 48.4 42 - 52 %    MCV 87.7 78 - 100 FL    MCH 29.2 27 - 31 PG    MCHC 33.3 32.0 - 36.0 %    RDW 13.3 11.7 - 14.9 %    Platelets 105 663 - 832 K/CU MM    MPV 10.2 7.5 - 11.1 FL    Differential Type AUTOMATED DIFFERENTIAL     Segs Relative 66.8 (H) 36 - 66 %    Lymphocytes % 19.7 (L) 24 - 44 %    Monocytes % 11.5 (H) 0 - 4 %    Eosinophils % 1.6 0 - 3 %    Basophils % 0.3 0 - 1 %    Segs Absolute 5.9 K/CU MM    Lymphocytes Absolute 1.7 K/CU MM    Monocytes Absolute 1.0 K/CU MM    Eosinophils Absolute 0.1 K/CU MM    Basophils Absolute 0.0 K/CU MM    Nucleated RBC % 0.0 %    Total Nucleated RBC 0.0 K/CU MM    Total Immature Neutrophil 0.01 K/CU MM    Immature Neutrophil % 0.1 0 - 0.43 %   Comprehensive Metabolic Panel w/ Reflex to MG   Result Value Ref Range    Sodium 136 135 - 145 MMOL/L    Potassium 4.4 3.5 - 5.1 MMOL/L    Chloride 102 99 - 110 mMol/L    CO2 24 21 - 32 MMOL/L    BUN 25 (H) 6 - 23 MG/DL    CREATININE 1.3 0.9 - 1.3 MG/DL    Glucose 189 (H) 70 - 99 MG/DL    Calcium 8.9 8.3 - 10.6 MG/DL    Albumin 3.9 3.4 - 5.0 GM/DL    Total Protein 6.2 (L) 6.4 - 8.2 GM/DL    Total Bilirubin 0.6 0.0 - 1.0 MG/DL    ALT 10 10 - 40 U/L    AST 24 15 - 37 IU/L    Alkaline Phosphatase 129 40 - 129 IU/L    GFR Non- 53 (L) >60 mL/min/1.73m2    GFR African American >60 >60 mL/min/1.73m2    Anion Gap 10 4 - 16   Troponin   Result Value Ref Range    Troponin T 0.024 (H) <0.01 NG/ML   Brain Natriuretic Peptide   Result Value Ref Range    Pro-.8 (H) <300 PG/ML   EKG 12 Lead   Result Value Ref Range    Ventricular Rate 143 BPM    Atrial Rate 141 BPM    QRS Duration 88 ms    Q-T Interval 302 ms    QTc Calculation (Bazett) 466 ms    R Axis -9 degrees    T Axis 22 degrees    Diagnosis       Supraventricular tachycardia  Inferior infarct (cited on or before 05-OCT-2016)  Abnormal ECG  When compared with ECG of 03-DEC-2020 10:37,  PA interval has decreased  Vent. rate has increased BY  75 BPM  T wave inversion no longer evident in Inferior leads     EKG 12 Lead   Result Value Ref Range    Ventricular Rate 82 BPM    Atrial Rate 82 BPM    P-R Interval 270 ms    QRS Duration 92 ms    Q-T Interval 378 ms    QTc Calculation (Bazett) 441 ms    P Axis 82 degrees    R Axis -4 degrees    T Axis 7 degrees    Diagnosis       Sinus rhythm with 1st degree AV block  Inferior infarct (cited on or before 05-OCT-2016)  Abnormal ECG  When compared with ECG of 08-APR-2021 09:32,  PA interval has increased  Vent. rate has decreased BY  61 BPM  Non-specific change in ST segment in Inferior leads          Radiographs:  Xr Chest (2 Vw)    Result Date: 4/8/2021  EXAMINATION: TWO XRAY VIEWS OF THE CHEST 4/8/2021 9:31 am COMPARISON: 12/03/2020 HISTORY: ORDERING SYSTEM PROVIDED HISTORY: CP TECHNOLOGIST PROVIDED HISTORY: Reason for exam:->CP Reason for Exam: CP Acuity: Acute Type of Exam: Initial FINDINGS: Status post median sternotomy. The superior most sternal wires are fractured. This is unchanged in the prior exam.  Left shoulder arthroplasty is partially visualized. No focal consolidation, pleural effusion or pneumothorax. The cardiomediastinal silhouette is stable. No overt pulmonary edema. The osseous structures are stable. Lungs are hyperinflated. COPD. No new acute cardiopulmonary findings. EKG: (All EKG's are interpreted by myself in the absence of a cardiologist). EKG performed on 4/8/2021 at 9:32 AM demonstrates ventricular rate of 143 bpm supraventricular tachycardia.   Nonspecific ST-T wave changes in inferior leads. Repeat EKG performed on 4/8/2021 at 1306 demonstrates ventricular rate of 79 bpm sinus rhythm with first-degree AV block. No signs of acute ST segment elevation myocardial infarction. EKG is compared to previous EKG performed earlier today, patient is now in normal sinus rhythm. MDM:  Patient was seen and evaluated in the emergency department by myself. A thorough history and physical exam were performed, prior medical records were reviewed. Most recent echocardiogram is from October 2019 at which time left ventricular ejection fraction was 45-50%. Upon arrival to the emergency department, patient's vital signs were noted. Patient is initially tachycardic with a heart rate of 137 bpm.  No tachypnea or hypoxia. Blood pressure 120/88. Patient is afebrile. Patient was connected to continuous cardiopulmonary monitoring. Rhythm strip interpreted by myself demonstrating sinus tachy/SVT. EKG was performed, interpreted by myself, as detailed above. Differential diagnoses and treatment plan were discussed. IV access was established and patient was initiated on normal saline. Cardizem is initiated secondary to tachycardic heart rate. Pertinent labs were drawn and radiographic studies were performed. Once results are available, they are reviewed by myself. Labs demonstrate no leukocytosis, anemia, thrombocytopenia. Electrolytes are within normal limits. No signs of kidney injury. Troponin is 0.024. BNP is 756.8. Chest x-ray read as reported COPD. No acute cardiopulmonary findings. On repeat evaluations, patient remains hemodynamically stable. Reports improvement in symptoms with therapy provided in the ED. Results of laboratory and radiographic data along with differential diagnoses and treatment plan were discussed with the patient. Patient presents with multiple symptoms including chest pain, lightheadedness.  Heart score is:    HEART Score:  Heart Score for chest pain patients  History: Moderately Suspicious  ECG: Non-Specifc repolarization disturbance/LBTB/PM  Patient Age: > 65 years  *Risk factors for Atherosclerotic disease: Hypercholesterolemia, Hypertension, Coronary Artery Disease, Diabetes Mellitus  Risk Factors: > 3 Risk factors or history of atherosclerotic disease*  Troponin: > 3X normal limit  Heart Score Total: 8    Patient is at severe risk for major acute coronary event. Did have tachycardic rate when he presented, Cardizem is initiated and heart rate does improve. Results of laboratory radiographic data along differential diagnosis and treatment plan are discussed. Patient presents with chest pain. Does have significant cardiac history. Was in SVT, Cardizem initiated. Given his symptoms, we recommend mission. Patient is agreeable. Case discussed admitting hospitalist who is agreeable treatment plan accepts admission. Patient is updated bedside. Questions were answered. Patient is currently in the emergency department, in stable condition, waiting admission orders and bed placement      Clinical Impressions:  1. Paroxysmal supraventricular tachycardia ([address]    2. Acute chest pain    3. NSTEMI (non-ST elevated myocardial infarction) ([facility] [address]    4. Acute on chronic congestive heart failure, unspecified heart failure type [facility])        Critical Care Note:  Total critical care time provided today was 32 minutes. This excludes seperately billable procedures and family discussion time. Critical care time provided for obtaining history, conducting a physical exam, performing and monitoring interventions, ordering, collecting and interpreting tests, and establishing medical decision-making. There was a potential for life/limb threatening pathology requiring close evaluation and intervention with concern for patient decompensation.       ED Provider Disposition:  DISPOSITION  Admit      Comment: Please note this report has been produced using speech recognition

## 2021-04-08 NOTE — H&P
History and Physical      Name:  Raleigh Andre [id number] /Age/Sex: 1940  ([de-identified] y.o. male)   MRN & CSN:  [id number] & [id number] Admission Date/Time: 2021 11:19 AM   Location:  ED29/ED-29 PCP: Sary Crawford MD       Hospital Day: 1    Assessment and Plan:   [de-identified] y/o M with PMH of CAD, DM, HTN, MARVIN, GERD that is presenting with chest pain and dizziness. Chest Pain  Dizziness  NSTEMI  - Case d/w ED; given full dose ASA in ED; also started on Cardizem drip as per below; Cardiology consulted in ED  - At bedside patient stated his pain had resolved  - EKG with no acute ischemic changes  - CXR with no acute pathology  - Last Echo reviewed; EF 45-50%; mild MR  - Last Cath in 2018; severe two vessel CAD of LAD and L CX  - Will trend troponin  - Continue home DAPT and Statin  - Cardiology consulted in ED, appreciate recs  - NPO at midnight in event of any procedures tomorrow    SVT  - Started on Cardizem drip with rate controlled to 70's when seen at bedside  - Tele  - Cardiology following, continue Cardizem drip    HTN  - Continue Losartan and Coreg    DM  - SSI    MARVIN  - Home CPAP ordered    GERD  - s/p recent EGD with dilation for esophageal ring   - Continue home BID Protonix    Diet No diet orders on file   DVT Prophylaxis [] Lovenox, []  Heparin, [] SCDs, [] Ambulation   GI Prophylaxis [] PPI,  [] H2 Blocker,  [] Carafate,  [] Diet/Tube Feeds   Code Status Prior   Disposition Patient requires continued admission due to    MDM [] Low, [] Moderate,[]  High  Patient's risk as above due to      History of Present Illness:   [de-identified] y/o M with PMH of CAD, DM, HTN, MARVIN, GERD that is presenting with chest pain and dizziness. Patient states that his chest pain began last night. Localized substernally, left side of chest with some radiation up left side of his jaw. Did have some SOB and dizziness with this. No fevers or chills. States that his symptoms resolved when he got the ED today.   States that he has more\"    Full dentures     upper and lower    H/O chest pain 10/2016    Last chest pain: 12/2019    H/O echocardiogram 10/23/2019    EF 45-50%, Mild MR. Hypokinesis of the Basal infero-septal segment.  Hearing aid worn     bilateral/[place] both ears    History of nuclear stress test 10/23/2019    perfusion scan shows moderate size, severe intensity, non reversible perfusion defect in inferior wall suggesting inferior wall infarction.  History of PTCA 10/04/2016    ptca svg-diag    HTN (hypertension)     Follows with PCP    Hx of carotid artery stenosis     Hyperlipemia     Macular degeneration     Bilateral eyes (worse in right)- uses magnifying glass to read    MI (myocardial infarction) ([facility] 2006, nov 2016    sees Breann rees\"they said at the end of the heart cath had heart attack when they were pulling the wires out in 2016\"    Restless leg syndrome     Takes Gabapentin    Sciatic nerve pain, left 10/2019    leg    Sleep apnea     Uses CPAP    Wears glasses      PSHX:  has a past surgical history that includes Coronary angioplasty with stent (10/2016); hernia repair (1962); hernia repair (1991); Elbow fracture surgery (Left, 1996); eye surgery (Bilateral, 2013); Carotid endarterectomy (Right, 12/14/2016); Coronary artery bypass graft (11/27/2006); Colonoscopy (2007); Tonsillectomy (1950's); Shoulder Arthroplasty (Left, 1/31/2020); Total knee arthroplasty (Left, 12/10/2020); Endoscopy, colon, diagnostic (03/17/2021); and Upper gastrointestinal endoscopy (N/A, 3/17/2021). Allergies: No Known Allergies    FAM HX: family history includes Diabetes in his brother; Heart Disease in his father; High Blood Pressure in his mother.   Soc HX:   Social History     Socioeconomic History    Marital status:      Spouse name: None    Number of children: None    Years of education: None    Highest education level: None   Occupational History    None   Social Needs    Financial resource strain: None    Food insecurity     Worry: None     Inability: None    Transportation needs     Medical: None     Non-medical: None   Tobacco Use    Smoking status: Former Smoker     Packs/day: 1.50     Years: 12.00     Pack years: 18.00     Types: Cigarettes     Start date: 1954     Quit date: 1966     Years since quittin.4    Smokeless tobacco: Former User     Types: Chew     Quit date: 1966   Substance and Sexual Activity    Alcohol use: No     Alcohol/week: 0.0 standard drinks     Comment: CAFFEINE 1 POT OF COFFEE A DAY    Drug use: No    Sexual activity: Yes     Partners: Female     Comment:    Lifestyle    Physical activity     Days per week: None     Minutes per session: None    Stress: None   Relationships    Social connections     Talks on phone: None     Gets together: None     Attends Jew service: None     Active member of club or organization: None     Attends meetings of clubs or organizations: None     Relationship status: None    Intimate partner violence     Fear of current or ex partner: None     Emotionally abused: None     Physically abused: None     Forced sexual activity: None   Other Topics Concern    None   Social History Narrative    None       Medications:   Medications:    Infusions:    dilTIAZem (CARDIZEM) 100 mg in dextrose 5% 100 mL (ADD-[place]) 5 mg/hr (21 1141)     PRN Meds:        Electronically signed by Rosemary Michele MD on 2021 at 1:48 PM

## 2021-04-08 NOTE — ED NOTES
Report given to [place] j rn.  Working on getting patient an inpatient bed and working TV     [facility]  04/08/21 1946

## 2021-04-08 NOTE — TELEPHONE ENCOUNTER
Patient's wife called stating that he is having sob, dizziness and chest pain. Please call her back ASAP at ph# [phone].

## 2021-04-08 NOTE — ED PROVIDER NOTES
As physician-in-triage, I performed a medical screening history and physical exam on this patient. HISTORY OF PRESENT ILLNESS  Reynaldo Ott is a [de-identified] y.o. male presents to the emergency department with chest pain, shortness of breath, palpitations. States that started yesterday. Has a heart rate in the 130s in triage. Afebrile. Denies any fevers, chills, productive cough, leg swelling. States he sees Dr. Saud Skelton of [facility]. [place] PHYSICAL EXAM  /88   Pulse 137   Temp 97.8 °F (36.6 °C) (Oral)   Resp 16   Ht 6' 1\" (1.854 m)   Wt 250 lb (113.4 kg)   SpO2 100%   BMI 32.98 kg/m²     On exam, the patient appears in no acute distress. Speech is clear. Breathing is unlabored. Moves all extremities    Comment: Please note this report has been produced using speech recognition software and may contain errors related to that system including errors in grammar, punctuation, and spelling, as well as words and phrases that may be inappropriate. If there are any questions or concerns please feel free to contact the dictating provider for clarification.        Tiffani Chou MD  04/08/21 2363

## 2021-04-09 ENCOUNTER — APPOINTMENT (OUTPATIENT)
Dept: ULTRASOUND IMAGING | Age: 81
DRG: 310 | End: 2021-04-09
Payer: MEDICARE

## 2021-04-09 ENCOUNTER — APPOINTMENT (OUTPATIENT)
Dept: NUCLEAR MEDICINE | Age: 81
DRG: 310 | End: 2021-04-09
Payer: MEDICARE

## 2021-04-09 LAB
EKG ATRIAL RATE: 79 BPM
EKG ATRIAL RATE: 84 BPM
EKG DIAGNOSIS: NORMAL
EKG DIAGNOSIS: NORMAL
EKG P AXIS: 72 DEGREES
EKG P AXIS: 76 DEGREES
EKG P-R INTERVAL: 264 MS
EKG P-R INTERVAL: 280 MS
EKG Q-T INTERVAL: 390 MS
EKG Q-T INTERVAL: 390 MS
EKG QRS DURATION: 100 MS
EKG QRS DURATION: 94 MS
EKG QTC CALCULATION (BAZETT): 447 MS
EKG QTC CALCULATION (BAZETT): 460 MS
EKG R AXIS: -4 DEGREES
EKG R AXIS: 2 DEGREES
EKG T AXIS: 10 DEGREES
EKG T AXIS: 7 DEGREES
EKG VENTRICULAR RATE: 79 BPM
EKG VENTRICULAR RATE: 84 BPM
GLUCOSE BLD-MCNC: 125 MG/DL (ref 70–99)
GLUCOSE BLD-MCNC: 156 MG/DL (ref 70–99)
GLUCOSE BLD-MCNC: 157 MG/DL (ref 70–99)
GLUCOSE BLD-MCNC: 158 MG/DL (ref 70–99)
GLUCOSE BLD-MCNC: 182 MG/DL (ref 70–99)
HCT VFR BLD CALC: 42.6 % (ref 42–52)
HEMOGLOBIN: 14.1 GM/DL (ref 13.5–18)
LV EF: 43 %
LV EF: 48 %
LVEF MODALITY: NORMAL
LVEF MODALITY: NORMAL
MCH RBC QN AUTO: 29 PG (ref 27–31)
MCHC RBC AUTO-ENTMCNC: 33.1 % (ref 32–36)
MCV RBC AUTO: 87.7 FL (ref 78–100)
PDW BLD-RTO: 13.2 % (ref 11.7–14.9)
PLATELET # BLD: 141 K/CU MM (ref 140–440)
PMV BLD AUTO: 9.6 FL (ref 7.5–11.1)
RBC # BLD: 4.86 M/CU MM (ref 4.6–6.2)
TROPONIN T: <0.01 NG/ML
WBC # BLD: 5.2 K/CU MM (ref 4–10.5)

## 2021-04-09 PROCEDURE — 36415 COLL VENOUS BLD VENIPUNCTURE: CPT

## 2021-04-09 PROCEDURE — 93005 ELECTROCARDIOGRAM TRACING: CPT | Performed by: INTERNAL MEDICINE

## 2021-04-09 PROCEDURE — 78452 HT MUSCLE IMAGE SPECT MULT: CPT

## 2021-04-09 PROCEDURE — 6370000000 HC RX 637 (ALT 250 FOR IP): Performed by: INTERNAL MEDICINE

## 2021-04-09 PROCEDURE — 2580000003 HC RX 258: Performed by: INTERNAL MEDICINE

## 2021-04-09 PROCEDURE — 6360000002 HC RX W HCPCS: Performed by: INTERNAL MEDICINE

## 2021-04-09 PROCEDURE — 3430000000 HC RX DIAGNOSTIC RADIOPHARMACEUTICAL: Performed by: INTERNAL MEDICINE

## 2021-04-09 PROCEDURE — 84484 ASSAY OF TROPONIN QUANT: CPT

## 2021-04-09 PROCEDURE — 99222 1ST HOSP IP/OBS MODERATE 55: CPT | Performed by: INTERNAL MEDICINE

## 2021-04-09 PROCEDURE — 93017 CV STRESS TEST TRACING ONLY: CPT

## 2021-04-09 PROCEDURE — 93010 ELECTROCARDIOGRAM REPORT: CPT | Performed by: INTERNAL MEDICINE

## 2021-04-09 PROCEDURE — 82962 GLUCOSE BLOOD TEST: CPT

## 2021-04-09 PROCEDURE — 2140000000 HC CCU INTERMEDIATE R&B

## 2021-04-09 PROCEDURE — 93970 EXTREMITY STUDY: CPT

## 2021-04-09 PROCEDURE — 94761 N-INVAS EAR/PLS OXIMETRY MLT: CPT

## 2021-04-09 PROCEDURE — A9500 TC99M SESTAMIBI: HCPCS | Performed by: INTERNAL MEDICINE

## 2021-04-09 PROCEDURE — 93306 TTE W/DOPPLER COMPLETE: CPT

## 2021-04-09 PROCEDURE — 85027 COMPLETE CBC AUTOMATED: CPT

## 2021-04-09 RX ORDER — SODIUM CHLORIDE 9 MG/ML
25 INJECTION, SOLUTION INTRAVENOUS PRN
Status: DISCONTINUED | OUTPATIENT
Start: 2021-04-09 | End: 2021-04-10 | Stop reason: HOSPADM

## 2021-04-09 RX ORDER — SODIUM CHLORIDE 0.9 % (FLUSH) 0.9 %
5-40 SYRINGE (ML) INJECTION EVERY 12 HOURS SCHEDULED
Status: DISCONTINUED | OUTPATIENT
Start: 2021-04-09 | End: 2021-04-10 | Stop reason: HOSPADM

## 2021-04-09 RX ORDER — PANTOPRAZOLE SODIUM 40 MG/1
40 TABLET, DELAYED RELEASE ORAL
Status: DISCONTINUED | OUTPATIENT
Start: 2021-04-09 | End: 2021-04-10 | Stop reason: HOSPADM

## 2021-04-09 RX ORDER — DEXTROSE MONOHYDRATE 25 G/50ML
12.5 INJECTION, SOLUTION INTRAVENOUS PRN
Status: DISCONTINUED | OUTPATIENT
Start: 2021-04-09 | End: 2021-04-10 | Stop reason: HOSPADM

## 2021-04-09 RX ORDER — MAGNESIUM OXIDE 400 MG/1
200 TABLET ORAL 2 TIMES DAILY
Status: DISCONTINUED | OUTPATIENT
Start: 2021-04-09 | End: 2021-04-10 | Stop reason: HOSPADM

## 2021-04-09 RX ORDER — PROMETHAZINE HYDROCHLORIDE 25 MG/1
12.5 TABLET ORAL EVERY 6 HOURS PRN
Status: DISCONTINUED | OUTPATIENT
Start: 2021-04-09 | End: 2021-04-10 | Stop reason: HOSPADM

## 2021-04-09 RX ORDER — POLYETHYLENE GLYCOL 3350 17 G/17G
17 POWDER, FOR SOLUTION ORAL DAILY PRN
Status: DISCONTINUED | OUTPATIENT
Start: 2021-04-09 | End: 2021-04-10 | Stop reason: HOSPADM

## 2021-04-09 RX ORDER — ACETAMINOPHEN 325 MG/1
650 TABLET ORAL EVERY 6 HOURS PRN
Status: DISCONTINUED | OUTPATIENT
Start: 2021-04-09 | End: 2021-04-10 | Stop reason: HOSPADM

## 2021-04-09 RX ORDER — ASPIRIN 81 MG/1
81 TABLET, CHEWABLE ORAL DAILY
Status: DISCONTINUED | OUTPATIENT
Start: 2021-04-09 | End: 2021-04-10 | Stop reason: HOSPADM

## 2021-04-09 RX ORDER — DEXTROSE MONOHYDRATE 50 MG/ML
100 INJECTION, SOLUTION INTRAVENOUS PRN
Status: DISCONTINUED | OUTPATIENT
Start: 2021-04-09 | End: 2021-04-10 | Stop reason: HOSPADM

## 2021-04-09 RX ORDER — SODIUM CHLORIDE 0.9 % (FLUSH) 0.9 %
5-40 SYRINGE (ML) INJECTION PRN
Status: DISCONTINUED | OUTPATIENT
Start: 2021-04-09 | End: 2021-04-10 | Stop reason: HOSPADM

## 2021-04-09 RX ORDER — CARVEDILOL 6.25 MG/1
6.25 TABLET ORAL 2 TIMES DAILY WITH MEALS
Status: DISCONTINUED | OUTPATIENT
Start: 2021-04-09 | End: 2021-04-10 | Stop reason: HOSPADM

## 2021-04-09 RX ORDER — ONDANSETRON 2 MG/ML
4 INJECTION INTRAMUSCULAR; INTRAVENOUS EVERY 6 HOURS PRN
Status: DISCONTINUED | OUTPATIENT
Start: 2021-04-09 | End: 2021-04-10 | Stop reason: HOSPADM

## 2021-04-09 RX ORDER — ACETAMINOPHEN 650 MG/1
650 SUPPOSITORY RECTAL EVERY 6 HOURS PRN
Status: DISCONTINUED | OUTPATIENT
Start: 2021-04-09 | End: 2021-04-10 | Stop reason: HOSPADM

## 2021-04-09 RX ORDER — ROPINIROLE 1 MG/1
1 TABLET, FILM COATED ORAL NIGHTLY
Status: DISCONTINUED | OUTPATIENT
Start: 2021-04-09 | End: 2021-04-10 | Stop reason: HOSPADM

## 2021-04-09 RX ORDER — LOSARTAN POTASSIUM 25 MG/1
12.5 TABLET ORAL EVERY MORNING
Status: DISCONTINUED | OUTPATIENT
Start: 2021-04-09 | End: 2021-04-10 | Stop reason: HOSPADM

## 2021-04-09 RX ORDER — ATORVASTATIN CALCIUM 40 MG/1
40 TABLET, FILM COATED ORAL NIGHTLY
Status: DISCONTINUED | OUTPATIENT
Start: 2021-04-09 | End: 2021-04-10 | Stop reason: HOSPADM

## 2021-04-09 RX ORDER — GABAPENTIN 100 MG/1
100 CAPSULE ORAL 3 TIMES DAILY
Status: DISCONTINUED | OUTPATIENT
Start: 2021-04-09 | End: 2021-04-10 | Stop reason: HOSPADM

## 2021-04-09 RX ORDER — NICOTINE POLACRILEX 4 MG
15 LOZENGE BUCCAL PRN
Status: DISCONTINUED | OUTPATIENT
Start: 2021-04-09 | End: 2021-04-10 | Stop reason: HOSPADM

## 2021-04-09 RX ADMIN — REGADENOSON 0.4 MG: 0.08 INJECTION, SOLUTION INTRAVENOUS at 11:51

## 2021-04-09 RX ADMIN — MAGNESIUM OXIDE 200 MG: 400 TABLET ORAL at 22:00

## 2021-04-09 RX ADMIN — CARVEDILOL 6.25 MG: 6.25 TABLET, FILM COATED ORAL at 09:19

## 2021-04-09 RX ADMIN — PANTOPRAZOLE SODIUM 40 MG: 40 TABLET, DELAYED RELEASE ORAL at 06:09

## 2021-04-09 RX ADMIN — ASPIRIN 81 MG CHEWABLE TABLET 81 MG: 81 TABLET CHEWABLE at 09:14

## 2021-04-09 RX ADMIN — GABAPENTIN 100 MG: 100 CAPSULE ORAL at 09:14

## 2021-04-09 RX ADMIN — TICAGRELOR 60 MG: 60 TABLET ORAL at 09:14

## 2021-04-09 RX ADMIN — LOSARTAN POTASSIUM 12.5 MG: 25 TABLET, FILM COATED ORAL at 09:14

## 2021-04-09 RX ADMIN — PANTOPRAZOLE SODIUM 40 MG: 40 TABLET, DELAYED RELEASE ORAL at 14:04

## 2021-04-09 RX ADMIN — ATORVASTATIN CALCIUM 40 MG: 40 TABLET, FILM COATED ORAL at 22:00

## 2021-04-09 RX ADMIN — Medication 30 MILLICURIE: at 13:48

## 2021-04-09 RX ADMIN — GABAPENTIN 100 MG: 100 CAPSULE ORAL at 22:01

## 2021-04-09 RX ADMIN — TICAGRELOR 60 MG: 60 TABLET ORAL at 21:59

## 2021-04-09 RX ADMIN — GABAPENTIN 100 MG: 100 CAPSULE ORAL at 14:04

## 2021-04-09 RX ADMIN — CARVEDILOL 6.25 MG: 6.25 TABLET, FILM COATED ORAL at 17:05

## 2021-04-09 RX ADMIN — MAGNESIUM OXIDE 200 MG: 400 TABLET ORAL at 09:14

## 2021-04-09 RX ADMIN — ROPINIROLE HYDROCHLORIDE 1 MG: 1 TABLET, FILM COATED ORAL at 01:22

## 2021-04-09 RX ADMIN — ROPINIROLE HYDROCHLORIDE 1 MG: 1 TABLET, FILM COATED ORAL at 22:00

## 2021-04-09 RX ADMIN — MAGNESIUM OXIDE 200 MG: 400 TABLET ORAL at 01:22

## 2021-04-09 RX ADMIN — ENOXAPARIN SODIUM 40 MG: 40 INJECTION SUBCUTANEOUS at 09:15

## 2021-04-09 RX ADMIN — SODIUM CHLORIDE, PRESERVATIVE FREE 10 ML: 5 INJECTION INTRAVENOUS at 22:02

## 2021-04-09 RX ADMIN — ATORVASTATIN CALCIUM 40 MG: 40 TABLET, FILM COATED ORAL at 01:22

## 2021-04-09 RX ADMIN — Medication 10 MILLICURIE: at 13:49

## 2021-04-09 ASSESSMENT — PAIN SCALES - GENERAL
PAINLEVEL_OUTOF10: 0

## 2021-04-09 NOTE — CONSULTS
Internal Medicine Consult       Reason for admission: chest pain    History Obtained From:  patient    HISTORY OF PRESENT ILLNESS:    The patient is a [de-identified] y.o. male who presents with chest pain, palpitations. He has history of CAD s/p CABGx3, HTN, HLD, DM2, MARVIN, GERD, RLS. Patient came to ER with left sided chest pain and palpitations. EKG showed SVT with rate in the 130s. He was started on cardizem gtt and now he is NSR with improved rate. He did have troponin initially elevated at 0.024, but now the trending troponins are nml. Chest pain and palpitations resolved with resolution of SVT today. He is now off cardizem gtt, on oral meds. Patient initially admitted under hospital service, but now PCP Dr. Danielito Kaufman will take over care. He will get ECHO/stress test today. Past Medical History:        Diagnosis Date    Abnormal angiogram 10/04/2016    carotid - mod stenosis on right    Arthritis     Left shoulder, left foot    Bulging of lumbar intervertebral disc     x3    CAD (coronary artery disease)     Dr. Clem Mcgraw about 4 or 5 heart stents- never got any papers on that\"    Diabetes mellitus ([facility]     Type II - Follows with PCP\"just started me on Metformin 5 months ago\" per pt on 12/3/2020    Foot drop, left foot     hx drop foot left - \"from my sciatic nerve but I exercised it and now dont really bother me no more\"    Full dentures     upper and lower    H/O chest pain 10/2016    Last chest pain: 12/2019    H/O echocardiogram 10/23/2019    EF 45-50%, Mild MR. Hypokinesis of the Basal infero-septal segment.  Hearing aid worn     bilateral/[place] both ears    History of nuclear stress test 10/23/2019    perfusion scan shows moderate size, severe intensity, non reversible perfusion defect in inferior wall suggesting inferior wall infarction.     History of PTCA 10/04/2016    ptca svg-diag    HTN (hypertension)     Follows with PCP    Hx of carotid artery stenosis     Hyperlipemia     Macular degeneration     Bilateral eyes (worse in right)- uses magnifying glass to read    MI (myocardial infarction) ([facility] [address] 2006, nov 2016    sees Jessicaclaire [facility] negrita\"they said at the end of the heart cath had heart attack when they were pulling the wires out in 2016\"    Restless leg syndrome     Takes Gabapentin    Sciatic nerve pain, left 10/2019    leg    Sleep apnea     Uses CPAP    Wears glasses        Past Surgical History:        Procedure Laterality Date    CAROTID ENDARTERECTOMY Right 12/14/2016    COLONOSCOPY  2007    Normal exam per pt - Dr. Lutricia Hamman  10/2016    10/2016, 2/12/16, 5/16, 8/16 7 stents total from 4 caths in 2016 = 6 stents    CORONARY ARTERY BYPASS GRAFT  11/27/2006    x 3    ELBOW FRACTURE SURGERY Left 1996    no hardware    ENDOSCOPY, COLON, DIAGNOSTIC  03/17/2021    small hiatal hernia, esophagal ring, dilatation t18-20 mm    EYE SURGERY Bilateral 2013   [address]    from [id number] had 6 hernia surgies, unable to recall even estimates of years    SHOULDER ARTHROPLASTY Left 1/31/2020    SHOULDER TOTAL ARTHROPLASTY REVERSE performed by Bj Jhaveri MD at [facility] [address] Left 12/10/2020    LEFT KNEE TOTAL ARTHROPLASTY performed by Bj Jhaveri MD at [address] [place] [address] 3/17/2021    EGD DILATION BALLOON 18-20 BALLOON DILATED TO 20  performed by Colin Geronimo MD at [place] [address]       Medications Prior to Admission:    Medications Prior to Admission: aspirin 325 MG EC tablet, Take 1 tablet by mouth daily Take 1 tab by mouth daily for 2 weeks, then resume 81mg dose daily.   metFORMIN (GLUCOPHAGE) 500 MG tablet, Take 500 mg by mouth daily (with breakfast)  UNABLE TO FIND, uses Neuralgia cream  Twice a day for feet pain  rOPINIRole (REQUIP XL) 2 MG extended release tablet, Take 1 mg by mouth nightly  magnesium oxide (MAG-OX) 400 MG tablet, Take 250 mg by mouth 2 times daily  BRILINTA 90 MG TABS tablet, TAKE 1 TABLET TWICE A DAY  PRALUENT 75 MG/ML SOPN injection pen, Inject 1 mL into the skin every 14 days  nitroGLYCERIN (NITROSTAT) 0.4 MG SL tablet, Place 1 tablet under the tongue every 5 minutes as needed for Chest pain  atorvastatin (LIPITOR) 40 MG tablet, Take 1 tablet by mouth daily (Patient taking differently: Take 40 mg by mouth every morning )  gabapentin (NEURONTIN) 100 MG capsule, Take 100 mg by mouth 3 times daily  famotidine (PEPCID) 20 MG tablet, Take 20 mg by mouth 2 times daily  multivitamin (OCUVITE) TABS per tablet, Take 1 tablet by mouth daily. losartan (COZAAR) 25 MG tablet, Take 12.5 mg by mouth every morning 0.5 tablet daily   carvedilol (COREG) 6.25 MG tablet, Take 6.25 mg by mouth 2 times daily (with meals). Omega-3 Fatty Acids (FISH OIL) 1000 MG CAPS, Take 1,000 mg by mouth daily     Allergies:  Patient has no known allergies. Social History:   TOBACCO:   reports that he quit smoking about 54 years ago. His smoking use included cigarettes. He started smoking about 66 years ago. He has a 18.00 pack-year smoking history. He quit smokeless tobacco use about 54 years ago. His smokeless tobacco use included chew. ETOH:   reports no history of alcohol use. Family History:       Problem Relation Age of Onset    High Blood Pressure Mother     Heart Disease Father     Diabetes Brother        REVIEW OF SYSTEMS:  CONSTITUTIONAL:  Neg   fatigue,fever,chills   EYES:  Neg  acute change in vision  EARS:  Neg   hearing loss  NOSE:  Neg  rhinorrhea  MOUTH/THROAT:  Neg  bleeding gums  RESPIRATORY:   Neg SOB,wheeze,cough  CARDIOVASCULAR: chest pain,palpitations, denies edema  GASTROINTESTINAL:  Neg   nausea,vomiting, abdominal pain.   GENITOURINARY:  Neg  Urinary complaints   HEMATOLOGIC/LYMPHATIC:  Neg  Anemia  MUSCULOSKELETAL:  Neg   New myalgias,bone pain,jNEUROLOGICAL:  Neg  Loss of Consciousness,aphasia or dysarthria. SKIN :  Neg  skin or hair changes,and has no itching,rashes,sores. PSYCHIATRIC:  Neg depression,personality changes,anxiety. PHYSICAL EXAM:  BP (!) 143/77   Pulse 81   Temp 97.9 °F (36.6 °C) (Oral)   Resp 21   Ht 6' 1\" (1.854 m)   Wt 232 lb 11.2 oz (105.6 kg)   SpO2 96%   BMI 30.70 kg/m²   General appearance: alert, appears stated age, cooperative and no distress  Head: Normocephalic, without obvious abnormality, atraumatic  Eyes: conjunctivae/corneas clear. Ears: normal external  ears  Neck: no adenopathy, supple, symmetrical  Lungs: clear to auscultation bilaterally  Heart: regular rate and rhythm, S1, S2 normal  Abdomen:soft, non-tender; non-distended normal bowel sounds  Extremities:Pedal edema Trace   Skin:   No rashes or lesions  Neurologic: Alert and oriented X 3. Mental status: Alert, oriented, thought content appropriate  Cranial nerves:II-XII Grossly intact  Sensory: normal  Motor:grossly normal    DATA:  EKG:      Sinus rhythm with 1st degree AV block with premature supraventricular complexes   Inferior infarct (cited on or before 05-OCT-2016)   Abnormal ECG   When compared with ECG of 08-APR-2021 13:06,   premature supraventricular complexes are now present      Supraventricular tachycardia   Inferior infarct (cited on or before 05-OCT-2016)   Abnormal ECG   When compared with ECG of 03-DEC-2020 10:37,   AZ interval has decreased   Vent. rate has increased BY  75 BPM   T wave inversion no longer evident in Inferior leads   Confirmed by Renata Arroyo MD, [facility] ([id number]) on 4/8/2021 2:45:41 PM     Imaging:    CXR, 4/09/21  COPD.  No new acute cardiopulmonary findings.      CBC with Differential:    Lab Results   Component Value Date    WBC 5.2 04/09/2021    RBC 4.86 04/09/2021    HGB 14.1 04/09/2021    HCT 42.6 04/09/2021     04/09/2021    MCV 87.7 04/09/2021    SEGSPCT 66.8 04/08/2021    LYMPHOPCT 19.7 04/08/2021    DIFFTYPE AUTOMATED DIFFERENTIAL 04/08/2021     BMP: Lab Results   Component Value Date     04/08/2021    K 4.4 04/08/2021     04/08/2021    CO2 24 04/08/2021    BUN 25 04/08/2021    CREATININE 1.3 04/08/2021    CALCIUM 8.9 04/08/2021    GFRAA >60 04/08/2021    LABGLOM 53 04/08/2021    GLUCOSE 189 04/08/2021     PT/INR:  No results found for: PTINR    ASSESSMENT / PLAN:     Assessment:  SVT: resolved, off cardizem gtt. Resume PO coreg. Elevated troponin: trending down. Could be related to SVT. Cardiology will do ECHO/stress today. CAD s/p CABG/HTN/HLD: on statin, coreg, DAPT, losartan. Cardiology following. DM2: SSI, POC glucose, hypoglycemia protocol. RLS: on requip. GERD on PPI    Plan:  Dr. Guadarrama Chol team will resume care today. SVT improved. Troponin trend down and symptoms improving. ECHO/stress test today per cardiology. Continue SSI. Monitor labs and patient condition. Electronically signed by Miguel Ángel Peace PA-C on 4/9/2021 at 12:02 PM   I have independently evaluated and examined this patient today. I have reviewed radiologic and biochemical tests on this patient. Management Plan is developed mutually with NIHARIKA Yarbrough. I have reviewed above note and agree with assessment and plan. Discussed with patient and wife.

## 2021-04-09 NOTE — ED NOTES
Report called to floor at this time. Patient to be transported to floor.      Blair Jeffrey RN  04/08/21 [id number]

## 2021-04-09 NOTE — CONSULTS
rOPINIRole (REQUIP) tablet 1 mg  1 mg Oral Nightly Kade Haque MD   1 mg at 04/09/21 0122    pantoprazole (PROTONIX) tablet 40 mg  40 mg Oral BID AC Kade Haque MD   40 mg at 04/09/21 0609    dilTIAZem 100 mg in dextrose 5 % 100 mL infusion ([facility])  5-15 mg/hr Intravenous Continuous Kade Haque MD   Stopped at 04/09/21 0645     Review of Systems:   · Constitutional: No Fever or Weight Loss   · Eyes: No Decreased Vision  · ENT: No Headaches, Hearing Loss or Vertigo  · Cardiovascular: + chest pain, dyspnea on exertion, palpitations or loss of consciousness  · Respiratory: No cough or wheezing    · Gastrointestinal: No abdominal pain, appetite loss, blood in stools, constipation, diarrhea or heartburn  · Genitourinary: No dysuria, trouble voiding, or hematuria  · Musculoskeletal:  No gait disturbance, weakness or joint complaints  · Integumentary: No rash or pruritis  · Neurological: No TIA or stroke symptoms  · Psychiatric: No anxiety or depression  · Endocrine: No malaise, fatigue or temperature intolerance  · Hematologic/Lymphatic: No bleeding problems, blood clots or swollen lymph nodes  · Allergic/Immunologic: No nasal congestion or hives  All systems negative except as marked. ·   ·      Physical Examination:    Vitals:    04/09/21 0919   BP: (!) 143/77   Pulse: 85   Resp: 18   Temp: afebrile   SpO2:       Wt Readings from Last 3 Encounters:   04/08/21 232 lb 11.2 oz (105.6 kg)   03/12/21 250 lb (113.4 kg)   02/22/21 238 lb (108 kg)     Body mass index is 30.7 kg/m². General Appearance:  No distress, conversant    Constitutional:  Well developed, Well nourished, No acute distress, Non-toxic appearance. HENT:  Normocephalic, Atraumatic, Bilateral external ears normal, Oropharynx moist, No oral exudates, Nose normal. Neck- Normal range of motion, No tenderness, Supple, No stridor,no apical-carotid delay, no carotid bruit  Eyes:  PERRL, EOMI, Conjunctiva normal, No discharge. Respiratory:  Normal breath sounds, No respiratory distress, No wheezing, No chest tenderness. ,no use of accessory muscles, diaphragm movement is normal  Cardiovascular: (PMI) apex non displaced,no lifts no thrills, no s3,no s4, Normal heart rate, Normal rhythm, No murmurs, No rubs, No gallops. Carotid arteries pulse and amplitude are normal no bruit, no abdominal bruit noted ( normal abdominal aorta ausculation), femoral arteries pulse and amplitude are normal no bruit, pedal pulses are normal  GI:  Bowel sounds normal, Soft, No tenderness, No masses, No pulsatile masses, no hepatosplenomegally, no bruits  : External genitalia appear normal, No masses or lesions. No discharge. No CVA tenderness. Musculoskeletal:  Intact distal pulses, No edema, No tenderness, No cyanosis, No clubbing. Good range of motion in all major joints. No tenderness to palpation or major deformities noted. Back- No tenderness. Integument:  Warm, Dry, No erythema, No rash. Skin: no rash, no ulcers  Lymphatic:  No lymphadenopathy noted. Neurologic:  Alert & oriented x 3, Normal motor function, Normal sensory function, No focal deficits noted. Psychiatric:  Affect normal, Judgment normal, Mood normal.   Lab Review   Recent Labs     04/09/21  0359   WBC 5.2   HGB 14.1   HCT 42.6         Recent Labs     04/08/21  0952      K 4.4      CO2 24   BUN 25*   CREATININE 1.3     Recent Labs     04/08/21  0952   AST 24   ALT 10   BILITOT 0.6   ALKPHOS 129     No results for input(s): TROPONINI in the last 72 hours. No results found for: BNP  Lab Results   Component Value Date    INR 0.98 06/01/2018    PROTIME 11.2 06/01/2018         EKG:svt    Chest Xray:    [address], echo, meds reviewed  Assessment: [de-identified] y. o.year old with PMH of  has a past medical history of Abnormal angiogram, Arthritis, Bulging of lumbar intervertebral disc, CAD (coronary artery disease), Diabetes mellitus ([place] [address] Foot drop, left foot, Full dentures, H/O chest pain, H/O echocardiogram, Hearing aid worn, History of nuclear stress test, History of PTCA, HTN (hypertension), Hx of carotid artery stenosis, Hyperlipemia, Macular degeneration, MI (myocardial infarction) ([facility] [address] Restless leg syndrome, Sciatic nerve pain, left, Sleep apnea, and Wears glasses. Recommendations:    1. Chest pain and elevated trop: could be related to SVT, will get stress test and echo today  2. SVT: resolved, off cardizem drip, will continue coreg  3. CAD\" HAD LCX , patent stent in OM AND LCX, patent LIMA AND SVG TO DIAGONAL IN 2018 AND MILDLY DEPRESSED LVEF 45% WITH REGIONAL wall motion abnormality. continue aspirin, brilinta, statins, coreg  4. Dyslipidemia: continue statins  5. DM: continie insilin  6. HTN: continue coreg  7. Health maintenance: exerise and diet  All labs, medications and tests reviewed, continue all other medications of all above medical condition listed as is.          Marco Durand MD, 4/9/2021 9:36 AM

## 2021-04-09 NOTE — ED NOTES
Pt's bed switched to full size in patient bed. Pt also ambulatory to BR, escorted.       Amparo Watters RN  04/08/21 2028

## 2021-04-10 VITALS
DIASTOLIC BLOOD PRESSURE: 74 MMHG | SYSTOLIC BLOOD PRESSURE: 154 MMHG | RESPIRATION RATE: 17 BRPM | HEART RATE: 78 BPM | HEIGHT: 73 IN | BODY MASS INDEX: 28.64 KG/M2 | WEIGHT: 216.13 LBS | TEMPERATURE: 97.4 F | OXYGEN SATURATION: 95 %

## 2021-04-10 PROBLEM — I47.1 PAROXYSMAL SUPRAVENTRICULAR TACHYCARDIA (HCC): Status: ACTIVE | Noted: 2021-04-10

## 2021-04-10 PROBLEM — I47.10 PAROXYSMAL SUPRAVENTRICULAR TACHYCARDIA: Status: ACTIVE | Noted: 2021-04-10

## 2021-04-10 LAB
GLUCOSE BLD-MCNC: 147 MG/DL (ref 70–99)
GLUCOSE BLD-MCNC: 157 MG/DL (ref 70–99)
GLUCOSE BLD-MCNC: 281 MG/DL (ref 70–99)

## 2021-04-10 PROCEDURE — 6370000000 HC RX 637 (ALT 250 FOR IP): Performed by: INTERNAL MEDICINE

## 2021-04-10 PROCEDURE — 82962 GLUCOSE BLOOD TEST: CPT

## 2021-04-10 PROCEDURE — 6360000002 HC RX W HCPCS: Performed by: INTERNAL MEDICINE

## 2021-04-10 PROCEDURE — 2580000003 HC RX 258: Performed by: INTERNAL MEDICINE

## 2021-04-10 RX ORDER — NITROGLYCERIN 0.4 MG/1
0.4 TABLET SUBLINGUAL EVERY 5 MIN PRN
Qty: 25 TABLET | Refills: 1 | Status: SHIPPED | OUTPATIENT
Start: 2021-04-10

## 2021-04-10 RX ORDER — PANTOPRAZOLE SODIUM 40 MG/1
40 TABLET, DELAYED RELEASE ORAL
Qty: 30 TABLET | Refills: 1 | Status: SHIPPED | OUTPATIENT
Start: 2021-04-10

## 2021-04-10 RX ADMIN — MAGNESIUM OXIDE 200 MG: 400 TABLET ORAL at 08:25

## 2021-04-10 RX ADMIN — LOSARTAN POTASSIUM 12.5 MG: 25 TABLET, FILM COATED ORAL at 08:25

## 2021-04-10 RX ADMIN — ENOXAPARIN SODIUM 40 MG: 40 INJECTION SUBCUTANEOUS at 08:26

## 2021-04-10 RX ADMIN — GABAPENTIN 100 MG: 100 CAPSULE ORAL at 08:24

## 2021-04-10 RX ADMIN — TICAGRELOR 60 MG: 60 TABLET ORAL at 08:31

## 2021-04-10 RX ADMIN — CARVEDILOL 6.25 MG: 6.25 TABLET, FILM COATED ORAL at 08:24

## 2021-04-10 RX ADMIN — ASPIRIN 81 MG CHEWABLE TABLET 81 MG: 81 TABLET CHEWABLE at 08:25

## 2021-04-10 RX ADMIN — PANTOPRAZOLE SODIUM 40 MG: 40 TABLET, DELAYED RELEASE ORAL at 08:26

## 2021-04-10 RX ADMIN — INSULIN LISPRO 6 UNITS: 100 INJECTION, SOLUTION INTRAVENOUS; SUBCUTANEOUS at 12:15

## 2021-04-10 RX ADMIN — SODIUM CHLORIDE, PRESERVATIVE FREE 10 ML: 5 INJECTION INTRAVENOUS at 08:26

## 2021-04-10 ASSESSMENT — PAIN SCALES - GENERAL: PAINLEVEL_OUTOF10: 0

## 2021-04-10 NOTE — PROGRESS NOTES
4/10/2021     Case reviewed with cardiology. His wife is in attendance. No further chest pain. Afebrile, VSS.  stress test yesterday showed a fixed inferior defect, with  Some reduced EF noted on echo. Meds were adjusted as per cardio. Lungs clear, heart regula,r abd soft, no leg edema. Stable for discharge, I will make sure he has an up to date bottle of NTG if needed.   Leanne Zamudio MD

## 2021-04-15 ENCOUNTER — OFFICE VISIT (OUTPATIENT)
Dept: CARDIOLOGY CLINIC | Age: 81
End: 2021-04-15
Payer: MEDICARE

## 2021-04-15 VITALS
DIASTOLIC BLOOD PRESSURE: 72 MMHG | HEART RATE: 80 BPM | BODY MASS INDEX: 32.15 KG/M2 | HEIGHT: 72 IN | WEIGHT: 237.4 LBS | SYSTOLIC BLOOD PRESSURE: 126 MMHG

## 2021-04-15 DIAGNOSIS — Z95.1 S/P CABG X 3: ICD-10-CM

## 2021-04-15 DIAGNOSIS — E78.2 MIXED HYPERLIPIDEMIA: ICD-10-CM

## 2021-04-15 DIAGNOSIS — I21.4 NON-ST ELEVATION MYOCARDIAL INFARCTION (NSTEMI) (HCC): ICD-10-CM

## 2021-04-15 DIAGNOSIS — I47.1 PAROXYSMAL SUPRAVENTRICULAR TACHYCARDIA (HCC): ICD-10-CM

## 2021-04-15 DIAGNOSIS — I77.9 BILATERAL CAROTID ARTERY DISEASE, UNSPECIFIED TYPE (HCC): ICD-10-CM

## 2021-04-15 DIAGNOSIS — I25.10 CORONARY ARTERY DISEASE INVOLVING NATIVE CORONARY ARTERY OF NATIVE HEART WITHOUT ANGINA PECTORIS: Primary | ICD-10-CM

## 2021-04-15 DIAGNOSIS — I10 ESSENTIAL HYPERTENSION: ICD-10-CM

## 2021-04-15 DIAGNOSIS — Z98.62 S/P ANGIOPLASTY: ICD-10-CM

## 2021-04-15 PROCEDURE — G8427 DOCREV CUR MEDS BY ELIG CLIN: HCPCS | Performed by: INTERNAL MEDICINE

## 2021-04-15 PROCEDURE — 1036F TOBACCO NON-USER: CPT | Performed by: INTERNAL MEDICINE

## 2021-04-15 PROCEDURE — 99214 OFFICE O/P EST MOD 30 MIN: CPT | Performed by: INTERNAL MEDICINE

## 2021-04-15 PROCEDURE — 1123F ACP DISCUSS/DSCN MKR DOCD: CPT | Performed by: INTERNAL MEDICINE

## 2021-04-15 PROCEDURE — 4040F PNEUMOC VAC/ADMIN/RCVD: CPT | Performed by: INTERNAL MEDICINE

## 2021-04-15 PROCEDURE — G8417 CALC BMI ABV UP PARAM F/U: HCPCS | Performed by: INTERNAL MEDICINE

## 2021-04-15 PROCEDURE — 1111F DSCHRG MED/CURRENT MED MERGE: CPT | Performed by: INTERNAL MEDICINE

## 2021-04-15 RX ORDER — ASPIRIN 81 MG/1
81 TABLET ORAL DAILY
Qty: 90 TABLET | Refills: 1 | Status: SHIPPED | OUTPATIENT
Start: 2021-04-15

## 2021-04-15 NOTE — LETTER
[facility]  [address] [address]  Phone: [phone]  Fax: [phone]    Abdirashid Garcia MD        April 15, 2021     Brian Fletcher MD  [address] [place] [address]    Patient: Radha Zelaya [id number]  MR Number: [id number]  YOB: 1940  Date of Visit: 4/15/2021    Dear Dr. Brian Fletcher: Thank you for the request for consultation for Ingrid Yoder to me for the evaluation of CAD/ SVT. Below are the relevant portions of my assessment and plan of care. If you have questions, please do not hesitate to call me. I look forward to following Sophie Handing along with you.     Sincerely,        Abdirashid Garcia MD

## 2021-04-15 NOTE — LETTER
Patient Name: Artwild Corral  : 1940  MRN# [id number]    REASON FOR VISIT:   CAD (coronary artery disease)  Hyperlipemia  HTN (hypertension)  MI (myocardial infarction) ([facility]  Chest pain  Unstable angina (HCC)  Angina at rest [facility])  Bilateral carotid artery disease ([facility]  Stenosis of right carotid artery  Paroxysmal supraventricular tachycardia (HCC)    Current Outpatient Medications   Medication Sig Dispense Refill    nitroGLYCERIN (NITROSTAT) 0.4 MG SL tablet Place 1 tablet under the tongue every 5 minutes as needed for Chest pain 25 tablet 1    ticagrelor (BRILINTA) 60 MG TABS tablet Take 1 tablet by mouth 2 times daily 60 tablet 1    pantoprazole (PROTONIX) 40 MG tablet Take 1 tablet by mouth 2 times daily (before meals) 30 tablet 1    aspirin 325 MG EC tablet Take 1 tablet by mouth daily Take 1 tab by mouth daily for 2 weeks, then resume 81mg dose daily. 14 tablet 0    metFORMIN (GLUCOPHAGE) 500 MG tablet Take 500 mg by mouth daily (with breakfast)      UNABLE TO FIND uses Neuralgia cream  Twice a day for feet pain      rOPINIRole (REQUIP XL) 2 MG extended release tablet Take 1 mg by mouth nightly      magnesium oxide (MAG-OX) 400 MG tablet Take 250 mg by mouth 2 times daily      PRALUENT 75 MG/ML SOPN injection pen Inject 1 mL into the skin every 14 days 6 Pen 2    atorvastatin (LIPITOR) 40 MG tablet Take 1 tablet by mouth daily (Patient taking differently: Take 40 mg by mouth every morning ) 90 tablet 0    gabapentin (NEURONTIN) 100 MG capsule Take 100 mg by mouth 3 times daily      famotidine (PEPCID) 20 MG tablet Take 20 mg by mouth 2 times daily      multivitamin (OCUVITE) TABS per tablet Take 1 tablet by mouth daily.  losartan (COZAAR) 25 MG tablet Take 12.5 mg by mouth every morning 0.5 tablet daily       carvedilol (COREG) 6.25 MG tablet Take 6.25 mg by mouth 2 times daily (with meals).       Omega-3 Fatty Acids (FISH OIL) 1000 MG CAPS Take 1,000 mg by mouth daily        No current facility-administered medications for this visit. Last visit: 10/6/2020  Complaints: none  Changes:none    Labs:[id number]    Last EK2021      STRESS TEST:  2021  abnormal stress test, mildy depressed LVEF, increased EDV, Myocardial    perfusion scan shows modrate size, severe intensity, non reversible    perfusion defect in inferior wall     ECHO: 2021   Left ventricular systolic function is abnormal.   Ejection fraction is visually estimated at 40-45%. The inferoseptal wall segments appear hypokinetic. Grade I diastolic dysfunction. Aortic valve leaflets are somewhat thickened. Mitral annular calcification is present. No evidence of any pericardial effusion    CAROTID: NONE    MUGA: NONE    LAST PACER CHECK: NONE    CARDIAC CATH: 2018  Left Heart Cath  1. Severe two vessel CAD of LAD & Left Circumflex with occlusion    of OM2    2. kissing Stents for Left Circumflex / OM 1 widely patent.    3. SVBG to OM2 known to be occluded. OM 2 gets collaterals from   [facility]. SVBG to Diagonal Patent, stent patent.    5. BURK to LAD is patent.    6. LV: Regional WMA with low normal LV systolic function.       Amio Protocol:none    CHADS: [id number] Score for Atrial Fibrillation Stroke Risk   Risk   Factors  Component Value   C CHF Yes 1   H HTN Yes 1   A2 Age >= 76 Yes,  [de-identified] y.o.) 2   D DM Yes 1   S2 Prior Stroke/TIA No 0   V Vascular Disease Yes 1   A Age 74-69 No,  [de-identified] y.o.) 0   Sc Sex male 0    [id number]  Score  6   Score last updated 4/15/21 9:52 AM EDT    Click here for a link to the UpToDate guideline \"Atrial Fibrillation: Anticoagulation therapy to prevent embolization    Disclaimer: Risk Score calculation is dependent on accuracy of patient problem list and past encounter diagnosis.

## 2021-04-15 NOTE — LETTER
Peyman LARES [facility]  1940  [id number]    Have you had any Chest Pain that is not new? - No          Have you had any Shortness of Breath - No      Have you had any dizziness - No   . Have you had any palpitations that are not new? - No    Is the patient on any of the following medications -   If Yes DO EKG - Needs done every 6 months    Do you have any edema - swelling in No        If we do not have these labs you are retrieve these labs for these providers!     Do you have a surgery or procedure scheduled in the near future - NO     Ask patient if they want to sign up for [employer] if they are not already signed up     Check to see if we have an E-MAIL on file for the patient     Check medication list thoroughly!!! AND RECONCILE OUTSIDE MEDICATIONS  If dose has changed change the entire order not just the MG  BE SURE TO ASK PATIENT IF THEY NEED MEDICATION REFILLS     At check out add to every patient's \"wrap up\" the following dot phrase AFTERHOURSEDUCATION and ensure we explain this to our patients

## 2021-04-15 NOTE — PATIENT INSTRUCTIONS
Please be informed that if you contact our office outside of normal business hours the physician on call cannot help with any scheduling or rescheduling issues, procedure instruction questions or any type of medication issue. We advise you for any urgent/emergency that you go to the nearest emergency room! PLEASE CALL OUR OFFICE DURING NORMAL BUSINESS HOURS    Monday - Friday   8 am to 5 pm    [facility]: [phone]    [place]:  [phone]  CAD:Yes. Patient's IMI finding on Cardiolite is old.   clinically stable. Patient is on optimal medical regimen ( see medication list above )  - Kathleen Mohs is currently  mildly symptomatic from CAD.          - changes in  treatment:   no           - Testing ordered:  no  Clarence classification: 1  [place] RISK SCORE:  GIDEON RISK SCORE:  HTN:well controlled on current medical regimen, see list above.              - changes in  treatment:   no   CARDIOMYOPATHY: None known   CONGESTIVE HEART FAILURE: NO KNOWN HISTORY.     VHD: No significant VHD noted  DYSLIPIDEMIA: Patient's profile is at / near [facility] is low                                Tolerating current medical regimen wellyes,                                                               See most recent Lab values in Labs section above. CAROTID ARTERY DISEASE:.yes,                No symptoms described pertaining to Carotid artery disease               Up to date on non invasive testing .yes,                Patient is on Guidelines recommended therapy. yes,   OTHER RELEVANT DIAGNOSIS:as noted in patient's active problem list:  TESTS ORDERED: None this visit                                            All previously ordered tests reviewed.   ARRHYTHMIAS: New SVT. Patient to be included in the research study.  MEDICATIONS: CPM   Office f/u in 3 months.

## 2021-04-15 NOTE — PROGRESS NOTES
 gabapentin (NEURONTIN) 100 MG capsule Take 100 mg by mouth 3 times daily      famotidine (PEPCID) 20 MG tablet Take 20 mg by mouth 2 times daily      multivitamin (OCUVITE) TABS per tablet Take 1 tablet by mouth daily.  losartan (COZAAR) 25 MG tablet Take 12.5 mg by mouth every morning 0.5 tablet daily       carvedilol (COREG) 6.25 MG tablet Take 6.25 mg by mouth 2 times daily (with meals).  Omega-3 Fatty Acids (FISH OIL) 1000 MG CAPS Take 1,000 mg by mouth daily       PRALUENT 75 MG/ML SOPN injection pen Inject 1 mL into the skin every 14 days 6 Pen 2     No current facility-administered medications for this visit. Allergies: Patient has no known allergies. Review of Systems:    Constitutional: Negative for diaphoresis and fatigue  Respiratory: Negative for shortness of breath  Cardiovascular: Negative for chest pain, dyspnea on exertion, claudication, edema, irregular heartbeat, murmur, palpitations or shortness of breath  Musculoskeletal: Negative for muscle pain, muscular weakness, negative for pain in arm and leg or swelling in foot and leg    Objective:  /72   Pulse 80   Ht 6' (1.829 m)   Wt 237 lb 6.4 oz (107.7 kg)   BMI 32.20 kg/m²   Wt Readings from Last 3 Encounters:   04/15/21 237 lb 6.4 oz (107.7 kg)   04/10/21 216 lb 2 oz (98 kg)   03/12/21 250 lb (113.4 kg)     Body mass index is 32.2 kg/m². GENERAL - Alert, oriented, pleasant, in no apparent distress. EYES: No jaundice, no conjunctival pallor. Neck - Supple. No jugular venous distention noted. No carotid bruits. Cardiovascular - Normal S1 and S2 without obvious murmur or gallop. Extremities - No cyanosis, clubbing, or significant edema. Pulmonary - No respiratory distress. No wheezes or rales.       Lab Review   Lab Results   Component Value Date    TROPONINT <0.010 04/09/2021    TROPONINT 0.011 04/08/2021     Lab Results   Component Value Date    PROBNP 756.8 04/08/2021     Lab Results   Component Value Date    INR 0.98 06/01/2018    INR 0.96 12/12/2016     Lab Results   Component Value Date    LABA1C 6.5 (H) 12/11/2020     Lab Results   Component Value Date    WBC 5.2 04/09/2021    WBC 8.8 04/08/2021    HCT 42.6 04/09/2021    HCT 48.4 04/08/2021    MCV 87.7 04/09/2021    MCV 87.7 04/08/2021     04/09/2021     04/08/2021     Lab Results   Component Value Date    CHOL 120 02/12/2016    CHOL 113 11/20/2015    TRIG 86 05/15/2017    TRIG 71 02/12/2016    HDL 55 05/15/2017    HDL 51 02/12/2016    LDLCALC 57 05/15/2017    LDLDIRECT 67 02/12/2016    LDLDIRECT 59 11/20/2015     Lab Results   Component Value Date    ALT 10 04/08/2021    ALT 5 (L) 12/12/2020    AST 24 04/08/2021    AST 15 12/12/2020     BMP:    Lab Results   Component Value Date     04/08/2021     12/13/2020    K 4.4 04/08/2021    K 4.3 12/13/2020     04/08/2021    CL 99 12/13/2020    CO2 24 04/08/2021    CO2 28 12/13/2020    BUN 25 04/08/2021    BUN 12 12/13/2020    CREATININE 1.3 04/08/2021    CREATININE 1.0 12/13/2020     CMP:   Lab Results   Component Value Date     04/08/2021     12/13/2020    K 4.4 04/08/2021    K 4.3 12/13/2020     04/08/2021    CL 99 12/13/2020    CO2 24 04/08/2021    CO2 28 12/13/2020    BUN 25 04/08/2021    BUN 12 12/13/2020    CREATININE 1.3 04/08/2021    CREATININE 1.0 12/13/2020    PROT 6.2 04/08/2021    PROT 5.3 12/12/2020     No results found for: TSH, TSHHS    Cardiolite 4/8/2021    abnormal stress test, mildy depressed LVEF, increased EDV, Myocardial    perfusion scan shows modrate size, severe intensity, non reversible    perfusion defect in inferior wall. ECHO 4/8/2021   Left ventricular systolic function is abnormal.   Ejection fraction is visually estimated at 40-45%. The inferoseptal wall segments appear hypokinetic. Grade I diastolic dysfunction. Aortic valve leaflets are somewhat thickened. Mitral annular calcification is present.    No evidence of any pericardial effusion. QUALITY MEASURES REVIEWED:  1.CAD:Patient is taking anti platelet agent:Yes  2. DYSLIPIDEMIA: Patient is on cholesterol lowering medication:Yes   3. Beta-Blocker therapy for CAD, if prior Myocardial Infarction:Yes   4. Counselled regarding smoking cessation. No   Patient does not Smoke. 5.Anticoagulation therapy (for A.Fib) No   Does Not have A.Fib. Berny Varghese 6.Discussed weight management strategies. Assessment & Plan:    Primary / Secondary prevention is the goal by aggressive risk modification, healthy and therapeutic life style changes for cardiovascular risk reduction. Various goals are discussed and multiple questions answered. CAD:Yes. Patient's IMI finding on Cardiolite is old.   clinically stable. Patient is on optimal medical regimen ( see medication list above )  - Toya Enrique is currently  mildly symptomatic from CAD.          - changes in  treatment:   no           - Testing ordered:  no  [place] classification: 1  [place] RISK SCORE:  GIDEON RISK SCORE:  HTN:well controlled on current medical regimen, see list above.              - changes in  treatment:   no   CARDIOMYOPATHY: None known   CONGESTIVE HEART FAILURE: NO KNOWN HISTORY.     VHD: No significant VHD noted  DYSLIPIDEMIA: Patient's profile is at / near [facility] is low                                Tolerating current medical regimen wellyes,                                                               See most recent Lab values in Labs section above. CAROTID ARTERY DISEASE:.yes,                No symptoms described pertaining to Carotid artery disease               Up to date on non invasive testing .yes,                Patient is on Guidelines recommended therapy. yes,   OTHER RELEVANT DIAGNOSIS:as noted in patient's active problem list:  TESTS ORDERED: None this visit                                            All previously ordered tests

## 2021-04-23 RX ORDER — ALIROCUMAB 75 MG/ML
INJECTION, SOLUTION SUBCUTANEOUS
Qty: 2 PEN | Refills: 3 | Status: SHIPPED | OUTPATIENT
Start: 2021-04-23 | End: 2021-10-06

## 2021-05-20 NOTE — DISCHARGE SUMMARY
[address], [address] [place] [address]                               DISCHARGE SUMMARY    PATIENT NAME: Aicha Osuna                   :        1940  MED REC NO:   [id number]                          ROOM:       3124  ACCOUNT NO:   [de-identified]                           ADMIT DATE: 2021  PROVIDER:     Melonie Herrera MD                  DISCHARGE DATE:  04/10/2021    DISCHARGE DIAGNOSES:  Include the followin. Paroxysmal supraventricular tachycardia. 2.  chest pain. Please note that he is a patient of Dr. Bipin White, and he was  originally admitted to the Hospitalist Service. He came to the ER, was  found to be in SVT and diagnosed with a non-STEMI. He was started on  Cardizem drip and Cardiology was consulted. There was a consult with  Dr. Angle Jose. The patient presented with chest pain on his left side  which radiated to his shoulder, especially with shortness of breath,  _____ nausea, relieved with rest and nitroglycerin and aggravated with  exertion. Again, Cardiology mentioned he had a STEMI, but I am not sure  if that is the case on the review. Dr. Niki Green took over the care from  the hospitalist.  His initial troponin was elevated but his trendings  were normal.  Therefore, he did not have a myocardial infarction. Echo  stress was ordered by Cardiology. I saw the patient on 05/10 and his  stress test showed a fixed inferior defect and some reduced ejection  fraction on the echo. I thought he was stable for discharge, and I made  sure he had an up-to-date bottle of nitroglycerin _____. He was  discharged in improved condition to home. DISCHARGE MEDICATIONS:  Include the followin.   Nitroglycerin sublingual p.r.n.  2.  Brilinta 60 mg b.i.d.  3.  Pantoprazole 40 mg b.i.d.  4.  Aspirin 325 daily for two weeks, then reduce to 81 mg.  5.  Metformin 500 mg with breakfast.  6.  Requip XL 2 mg nightly. 7.  Magnesium oxide 250 b.i.d.  8.  Praluent 75 mg injection every 14 days. 9.  Atorvastatin 40 mg daily. 10.  Gabapentin 100 mg t.i.d.  11.  Pepcid 20 mg b.i.d.  12.  Multivitamin daily. 13.  Losartan 25 mg half a tablet daily. 14.  Coreg 6.25 b.i.d.  15.  Omega fatty acids. Please make sure he gets the discharge summary.         Liam Yuen MD    D: 05/20/2021 0:31:50       T: 05/20/2021 6:08:08     /[email or web address]  Job#: [id number]     Doc#: [id number]    CC:

## 2021-08-02 ENCOUNTER — OFFICE VISIT (OUTPATIENT)
Dept: CARDIOLOGY CLINIC | Age: 81
End: 2021-08-02
Payer: MEDICARE

## 2021-08-02 VITALS
DIASTOLIC BLOOD PRESSURE: 78 MMHG | HEIGHT: 72 IN | SYSTOLIC BLOOD PRESSURE: 124 MMHG | HEART RATE: 84 BPM | WEIGHT: 235.2 LBS | BODY MASS INDEX: 31.86 KG/M2

## 2021-08-02 DIAGNOSIS — Z98.62 S/P ANGIOPLASTY: ICD-10-CM

## 2021-08-02 DIAGNOSIS — I25.10 CORONARY ARTERY DISEASE INVOLVING NATIVE CORONARY ARTERY OF NATIVE HEART WITHOUT ANGINA PECTORIS: Primary | ICD-10-CM

## 2021-08-02 DIAGNOSIS — I77.9 BILATERAL CAROTID ARTERY DISEASE, UNSPECIFIED TYPE (HCC): ICD-10-CM

## 2021-08-02 DIAGNOSIS — Z95.1 S/P CABG X 3: ICD-10-CM

## 2021-08-02 DIAGNOSIS — E78.2 MIXED HYPERLIPIDEMIA: ICD-10-CM

## 2021-08-02 DIAGNOSIS — I47.1 PAROXYSMAL SUPRAVENTRICULAR TACHYCARDIA (HCC): ICD-10-CM

## 2021-08-02 DIAGNOSIS — I10 ESSENTIAL HYPERTENSION: ICD-10-CM

## 2021-08-02 DIAGNOSIS — I21.4 NON-ST ELEVATION MYOCARDIAL INFARCTION (NSTEMI) (HCC): ICD-10-CM

## 2021-08-02 PROCEDURE — G8417 CALC BMI ABV UP PARAM F/U: HCPCS | Performed by: INTERNAL MEDICINE

## 2021-08-02 PROCEDURE — 4040F PNEUMOC VAC/ADMIN/RCVD: CPT | Performed by: INTERNAL MEDICINE

## 2021-08-02 PROCEDURE — 1036F TOBACCO NON-USER: CPT | Performed by: INTERNAL MEDICINE

## 2021-08-02 PROCEDURE — G8427 DOCREV CUR MEDS BY ELIG CLIN: HCPCS | Performed by: INTERNAL MEDICINE

## 2021-08-02 PROCEDURE — 99214 OFFICE O/P EST MOD 30 MIN: CPT | Performed by: INTERNAL MEDICINE

## 2021-08-02 PROCEDURE — 1123F ACP DISCUSS/DSCN MKR DOCD: CPT | Performed by: INTERNAL MEDICINE

## 2021-08-02 NOTE — LETTER
Patient Name: Junior Ventura  : 1940  MRN# [id number]    REASON FOR VISIT:      Current Outpatient Medications   Medication Sig Dispense Refill    PRALUENT 75 MG/ML SOAJ injection pen INJECT 1 ML EVERY 14 DAYS. 2 pen 3    aspirin EC 81 MG EC tablet Take 1 tablet by mouth daily 90 tablet 1    nitroGLYCERIN (NITROSTAT) 0.4 MG SL tablet Place 1 tablet under the tongue every 5 minutes as needed for Chest pain 25 tablet 1    ticagrelor (BRILINTA) 60 MG TABS tablet Take 1 tablet by mouth 2 times daily 60 tablet 1    pantoprazole (PROTONIX) 40 MG tablet Take 1 tablet by mouth 2 times daily (before meals) 30 tablet 1    metFORMIN (GLUCOPHAGE) 500 MG tablet Take 500 mg by mouth daily (with breakfast)      UNABLE TO FIND uses Neuralgia cream  Twice a day for feet pain      rOPINIRole (REQUIP XL) 2 MG extended release tablet Take 1 mg by mouth nightly      magnesium oxide (MAG-OX) 400 MG tablet Take 250 mg by mouth 2 times daily      PRALUENT 75 MG/ML SOPN injection pen Inject 1 mL into the skin every 14 days 6 Pen 2    atorvastatin (LIPITOR) 40 MG tablet Take 1 tablet by mouth daily (Patient taking differently: Take 40 mg by mouth every morning ) 90 tablet 0    gabapentin (NEURONTIN) 100 MG capsule Take 100 mg by mouth 3 times daily      famotidine (PEPCID) 20 MG tablet Take 20 mg by mouth 2 times daily      multivitamin (OCUVITE) TABS per tablet Take 1 tablet by mouth daily.  losartan (COZAAR) 25 MG tablet Take 12.5 mg by mouth every morning 0.5 tablet daily       carvedilol (COREG) 6.25 MG tablet Take 6.25 mg by mouth 2 times daily (with meals).  Omega-3 Fatty Acids (FISH OIL) 1000 MG CAPS Take 1,000 mg by mouth daily        No current facility-administered medications for this visit. Smoke: What:                           How much:    Alcohol:                                      How Much:     Caffeine: Pop:         Tea:            Coffee:                Chocolate:    Exercise:    Labs: Lipids:             CBC:       BMP/CMP:          TSH:              A1C:    Last Visit:  Complaints:  Changes:    Last EKG:    STRESS TEST:  4/2021  abnormal stress test, mildy depressed LVEF, increased EDV, Myocardial    perfusion scan shows modrate size, severe intensity, non reversible    perfusion defect in inferior wall       ECHO: 4/2021  Left ventricular systolic function is abnormal.   Ejection fraction is visually estimated at 40-45%. The inferoseptal wall segments appear hypokinetic. Grade I diastolic dysfunction. Aortic valve leaflets are somewhat thickened. Mitral annular calcification is present. No evidence of any pericardial effusion    CAROTID: NONE    MUGA: NONE    LAST PACER CHECK: NONE    CARDIAC CATH: 6/2018  1. Severe two vessel CAD of LAD & Left Circumflex with occlusion    of OM2    2. kissing Stents for Left Circumflex / OM 1 widely patent.    3. SVBG to OM2 known to be occluded. OM 2 gets collaterals from   [facility]. SVBG to Diagonal Patent, stent patent.    5. BURK to LAD is patent.    6. LV: [facility] with low normal LV systolic function  Amio Protocol:    CHADS: [id number] Score for Atrial Fibrillation Stroke Risk   Risk   Factors  Component Value   C CHF Yes 1   H HTN Yes 1   A2 Age >= 76 Yes,  [de-identified] y.o.) 2   D DM Yes 1   S2 Prior Stroke/TIA No 0   V Vascular Disease Yes 1   A Age 74-69 No,  [de-identified] y.o.) 0   Sc Sex male 0    [id number]  Score  6   Score last updated 8/2/21 2:95 AM EDT    Click here for a link to the UpToDate guideline \"Atrial Fibrillation: Anticoagulation therapy to prevent embolization    Disclaimer: Risk Score calculation is dependent on accuracy of patient problem list and past encounter diagnosis.

## 2021-08-02 NOTE — LETTER
[facility]  [address] [address]  Phone: [phone]  Fax: [phone]    Santi Ziegler MD    August 2, 2021     Edward Chung MD  [address] [place] [address]    Patient: Joann Bird [facility]   MR Number: [id number]   YOB: 1940   Date of Visit: 8/2/2021       Dear Rodrigo Trejo: Thank you for referring Robreto Turner to me for evaluation/treatment. Below are the relevant portions of my assessment and plan of care. If you have questions, please do not hesitate to call me. I look forward to following Rashida Hernandez along with you.     Sincerely,        Santi Ziegler MD

## 2021-08-02 NOTE — PROGRESS NOTES
OFFICE PROGRESS NOTES      Lewis Gerber is a [de-identified] y.o. male who has    CHIEF COMPLAINT AS FOLLOWS:  CHEST PAIN: Patient denies any C/O chest pains at this time.      SOB: No C/O SOB at this time.                 LEG EDEMA: No leg edema   PALPITATIONS: Denies any C/O Palpitations   DIZZINESS: No C/O Dizziness   SYNCOPE: None   OTHER:                                     HPI: Patient is here for F/U on CAD,   Arrhythmia, HTN & Dyslipidemia problems. CAD: Patient has known Hx of  CAD. Had angioplasty / CABG in the past.  Arrhythmia: Patient has known Hx of Supraventriculararrhythmia. HTN: Patient has known Hx of essential HTN. Has been treated with guideline recommended medical / physical/ diet therapy as stated below. Dyslipidemia: Patient has known Hx of mixed dyslipidemia. Has been treated with guideline recommended medical / physical/ diet therapy as stated below. He does not have any new complaints at this time. Current Outpatient Medications   Medication Sig Dispense Refill    PRALUENT 75 MG/ML SOAJ injection pen INJECT 1 ML EVERY 14 DAYS.  2 pen 3    aspirin EC 81 MG EC tablet Take 1 tablet by mouth daily 90 tablet 1    nitroGLYCERIN (NITROSTAT) 0.4 MG SL tablet Place 1 tablet under the tongue every 5 minutes as needed for Chest pain 25 tablet 1    ticagrelor (BRILINTA) 60 MG TABS tablet Take 1 tablet by mouth 2 times daily 60 tablet 1    pantoprazole (PROTONIX) 40 MG tablet Take 1 tablet by mouth 2 times daily (before meals) 30 tablet 1    metFORMIN (GLUCOPHAGE) 500 MG tablet Take 500 mg by mouth daily (with breakfast)      UNABLE TO FIND uses Neuralgia cream  Twice a day for feet pain      rOPINIRole (REQUIP XL) 2 MG extended release tablet Take 1 mg by mouth nightly      PRALUENT 75 MG/ML SOPN injection pen Inject 1 mL into the skin every 14 days 6 Pen 2    atorvastatin (LIPITOR) 40 MG tablet Take 1 tablet by mouth daily (Patient taking differently: Take 40 mg by mouth every morning ) 90 tablet 0    gabapentin (NEURONTIN) 100 MG capsule Take 100 mg by mouth 3 times daily      famotidine (PEPCID) 20 MG tablet Take 20 mg by mouth 2 times daily      multivitamin (OCUVITE) TABS per tablet Take 1 tablet by mouth daily.  losartan (COZAAR) 25 MG tablet Take 12.5 mg by mouth every morning 0.5 tablet daily       carvedilol (COREG) 6.25 MG tablet Take 6.25 mg by mouth 2 times daily (with meals).  magnesium oxide (MAG-OX) 400 MG tablet Take 250 mg by mouth 2 times daily (Patient not taking: Reported on 8/2/2021)      Omega-3 Fatty Acids (FISH OIL) 1000 MG CAPS Take 1,000 mg by mouth daily  (Patient not taking: Reported on 8/2/2021)       No current facility-administered medications for this visit. Allergies: Patient has no known allergies. Review of Systems:    Constitutional: Negative for diaphoresis and fatigue  Respiratory: Negative for shortness of breath  Cardiovascular: Negative for chest pain, dyspnea on exertion, claudication, edema, irregular heartbeat, murmur, palpitations or shortness of breath  Musculoskeletal: Negative for muscle pain, muscular weakness, negative for pain in arm and leg or swelling in foot and leg    Objective:  /78   Pulse 84   Ht 6' (1.829 m)   Wt 235 lb 3.2 oz (106.7 kg)   BMI 31.90 kg/m²   Wt Readings from Last 3 Encounters:   08/02/21 235 lb 3.2 oz (106.7 kg)   04/15/21 237 lb 6.4 oz (107.7 kg)   04/10/21 216 lb 2 oz (98 kg)     Body mass index is 31.9 kg/m². GENERAL - Alert, oriented, pleasant, in no apparent distress. EYES: No jaundice, no conjunctival pallor. Neck - Supple. No jugular venous distention noted. No carotid bruits. Cardiovascular - Normal S1 and S2 without obvious murmur or gallop. Extremities - No cyanosis, clubbing, or significant edema. Pulmonary - No respiratory distress. No wheezes or rales.       Lab Review   Lab Results   Component Value Date    TROPONINT <0.010 04/09/2021    TROPONINT 0.011 04/08/2021 Lab Results   Component Value Date    PROBNP 756.8 04/08/2021     Lab Results   Component Value Date    INR 0.98 06/01/2018    INR 0.96 12/12/2016     Lab Results   Component Value Date    LABA1C 6.5 (H) 12/11/2020     Lab Results   Component Value Date    WBC 5.2 04/09/2021    WBC 8.8 04/08/2021    HCT 42.6 04/09/2021    HCT 48.4 04/08/2021    MCV 87.7 04/09/2021    MCV 87.7 04/08/2021     04/09/2021     04/08/2021     Lab Results   Component Value Date    CHOL 120 02/12/2016    CHOL 113 11/20/2015    TRIG 86 05/15/2017    TRIG 71 02/12/2016    HDL 55 05/15/2017    HDL 51 02/12/2016    LDLCALC 57 05/15/2017    LDLDIRECT 67 02/12/2016    LDLDIRECT 59 11/20/2015     Lab Results   Component Value Date    ALT 10 04/08/2021    ALT 5 (L) 12/12/2020    AST 24 04/08/2021    AST 15 12/12/2020     BMP:    Lab Results   Component Value Date     04/08/2021     12/13/2020    K 4.4 04/08/2021    K 4.3 12/13/2020     04/08/2021    CL 99 12/13/2020    CO2 24 04/08/2021    CO2 28 12/13/2020    BUN 25 04/08/2021    BUN 12 12/13/2020    CREATININE 1.3 04/08/2021    CREATININE 1.0 12/13/2020     CMP:   Lab Results   Component Value Date     04/08/2021     12/13/2020    K 4.4 04/08/2021    K 4.3 12/13/2020     04/08/2021    CL 99 12/13/2020    CO2 24 04/08/2021    CO2 28 12/13/2020    BUN 25 04/08/2021    BUN 12 12/13/2020    CREATININE 1.3 04/08/2021    CREATININE 1.0 12/13/2020    PROT 6.2 04/08/2021    PROT 5.3 12/12/2020     No results found for: TSH, TSHHS    Cardiolite 4/8/2021    abnormal stress test, mildy depressed LVEF, increased EDV, Myocardial    perfusion scan shows modrate size, severe intensity, non reversible    perfusion defect in inferior wall.      ECHO 4/8/2021   Left ventricular systolic function is abnormal.   Ejection fraction is visually estimated at 40-45%.   The inferoseptal wall segments appear hypokinetic.   Grade I diastolic dysfunction.   Aortic valve leaflets are somewhat thickened.   Mitral annular calcification is present.   No evidence of any pericardial effusion. QUALITY MEASURES REVIEWED:  1.CAD:Patient is taking anti platelet agent:Yes  2. DYSLIPIDEMIA: Patient is on cholesterol lowering medication:Yes   3. Beta-Blocker therapy for CAD, if prior Myocardial Infarction:Yes   4. Counselled regarding smoking cessation. No   Patient does not Smoke. 5.Anticoagulation therapy (for A.Fib) No   Does Not have A.Fib.  6.Discussed weight management strategies. Assessment & Plan:    Primary / Secondary prevention is the goal by aggressive risk modification, healthy and therapeutic life style changes for cardiovascular risk reduction. Various goals are discussed and multiple questions answered. CAD:Yes. Patient's IMI finding on Cardiolite is old.   clinically stable. Patient is on optimal medical regimen ( see medication list above )  -  Patient is currently  mildly symptomatic from CAD.          - changes in  treatment:   no           - Testing ordered:  no  [place] classification: 1  FRAMINGHAM RISK SCORE:  GIDEON RISK SCORE:  HTN:well controlled on current medical regimen, see list above.              - changes in  treatment:   no   CARDIOMYOPATHY: None known   CONGESTIVE HEART FAILURE: NO KNOWN HISTORY.     VHD: No significant VHD noted  DYSLIPIDEMIA: Patient's profile is at / near [facility] is low                                Tolerating current medical regimen wellyes,                                                               See most recent Lab values in Labs section above. CAROTID ARTERY DISEASE:.yes,                No symptoms described pertaining to Carotid artery disease               Up to date on non invasive testing .yes,                Patient is on Guidelines recommended therapy. yes,   OTHER RELEVANT DIAGNOSIS:as noted in patient's active problem list:  TESTS ORDERED: None this visit                                            All previously ordered tests reviewed.   ARRHYTHMIAS: h/o SVT.  MEDICATIONS: CPM   Office f/u in 6 months.

## 2021-10-06 RX ORDER — ALIROCUMAB 75 MG/ML
INJECTION, SOLUTION SUBCUTANEOUS
Qty: 2.24 ML | Refills: 3 | Status: SHIPPED | OUTPATIENT
Start: 2021-10-06 | End: 2022-07-19

## 2022-02-24 ENCOUNTER — OFFICE VISIT (OUTPATIENT)
Dept: CARDIOLOGY CLINIC | Age: 82
End: 2022-02-24
Payer: MEDICARE

## 2022-02-24 VITALS
WEIGHT: 240 LBS | SYSTOLIC BLOOD PRESSURE: 130 MMHG | HEIGHT: 72 IN | BODY MASS INDEX: 32.51 KG/M2 | HEART RATE: 78 BPM | DIASTOLIC BLOOD PRESSURE: 88 MMHG

## 2022-02-24 DIAGNOSIS — I77.9 BILATERAL CAROTID ARTERY DISEASE, UNSPECIFIED TYPE (HCC): ICD-10-CM

## 2022-02-24 DIAGNOSIS — I10 PRIMARY HYPERTENSION: ICD-10-CM

## 2022-02-24 DIAGNOSIS — I47.1 PAROXYSMAL SUPRAVENTRICULAR TACHYCARDIA (HCC): ICD-10-CM

## 2022-02-24 DIAGNOSIS — E78.2 MIXED HYPERLIPIDEMIA: ICD-10-CM

## 2022-02-24 DIAGNOSIS — Z95.1 S/P CABG X 3: ICD-10-CM

## 2022-02-24 DIAGNOSIS — I25.10 CORONARY ARTERY DISEASE INVOLVING NATIVE CORONARY ARTERY OF NATIVE HEART WITHOUT ANGINA PECTORIS: Primary | ICD-10-CM

## 2022-02-24 DIAGNOSIS — Z98.62 S/P ANGIOPLASTY: ICD-10-CM

## 2022-02-24 PROCEDURE — G8417 CALC BMI ABV UP PARAM F/U: HCPCS | Performed by: INTERNAL MEDICINE

## 2022-02-24 PROCEDURE — 1123F ACP DISCUSS/DSCN MKR DOCD: CPT | Performed by: INTERNAL MEDICINE

## 2022-02-24 PROCEDURE — G8484 FLU IMMUNIZE NO ADMIN: HCPCS | Performed by: INTERNAL MEDICINE

## 2022-02-24 PROCEDURE — G8427 DOCREV CUR MEDS BY ELIG CLIN: HCPCS | Performed by: INTERNAL MEDICINE

## 2022-02-24 PROCEDURE — 1036F TOBACCO NON-USER: CPT | Performed by: INTERNAL MEDICINE

## 2022-02-24 PROCEDURE — 4040F PNEUMOC VAC/ADMIN/RCVD: CPT | Performed by: INTERNAL MEDICINE

## 2022-02-24 PROCEDURE — 99214 OFFICE O/P EST MOD 30 MIN: CPT | Performed by: INTERNAL MEDICINE

## 2022-02-24 NOTE — LETTER
[facility]  [address] [address]  Phone: [phone]  Fax: [phone]    Dylan Mendieta MD    February 24, 2022     Silvano Salinas MD  [address] [place] [address]    Patient: Alvarez Torres [id number]   MR Number: [id number]   YOB: 1940   Date of Visit: 2/24/2022       Dear Silvano Salinas: Thank you for referring Gigi Hernandez to me for evaluation/treatment. Below are the relevant portions of my assessment and plan of care. If you have questions, please do not hesitate to call me. I look forward to following Molly Carboneite along with you.     Sincerely,      Dylan Mendieta MD

## 2022-02-24 NOTE — PATIENT INSTRUCTIONS
CORONARY ARTERY DISEASE:Yes. Patient's IMI finding on Cardiolite is old.   clinically stable. Patient is on optimal medical regimen ( see medication list above )  -  Patient is currently  mildly symptomatic from CAD.          - changes in  treatment:   no, ON asa & Coreg. Low dose Brilinta           - Testing ordered:  no  [place]organ classification: 1  Cardiolite 4/8/2021    abnormal stress test, mildy depressed LVEF, increased EDV, Myocardial    perfusion scan shows modrate size, severe intensity, non reversible    perfusion defect in inferior wall. HTN:well controlled on current medical regimen, see list above.              - changes in  treatment:   no, on Cozaar & Coreg   CARDIOMYOPATHY: None known   CONGESTIVE HEART FAILURE: NO KNOWN HISTORY.     VHD: No significant VHD noted  ECHO 4/8/2021   Left ventricular systolic function is abnormal.   Ejection fraction is visually estimated at 40-45%.   The inferoseptal wall segments appear hypokinetic.   Grade I diastolic dysfunction.   Aortic valve leaflets are somewhat thickened.   Mitral annular calcification is present.   No evidence of any pericardial effusion. DYSLIPIDEMIA: Patient's profile is at / near [facility] is low                                Tolerating current medical regimen wellyes, takes Lipitor & Praluent.                                                               See most recent Lab values in Labs section above. CAROTID ARTERY DISEASE: yes,                No symptoms described pertaining to Carotid artery disease               Up to date on non invasive testing .yes,                Patient is on Guidelines recommended therapy. yes,      ARRHYTHMIAS: h/o SVT. TESTS ORDERED: Carotid US     PREVIOUSLY ORDERED TESTS REVIEWED & DISCUSSED WITH THE PATIENT:     I personally reviewed & interpreted, all previously ordered tests as copied above. Latest Labs are pulled in to the note with dates.    Labs, specially in Reference to Lipid profile, Cardiac testing in the form of Echo ( dated: ), stress tests ( dated: ) & other relevant cardiac testing reviewed with patient & recommendations made based on assessment of the results. Discussed role of Cardiac risk factors & effects + treatment of co morbidities with patient & advised accordingly. MEDICATIONS: List of medications patient is currently taking is reviewed in detail with the patient & family member present. Discussed any side effects or problems taking the medication. Recommend Continue present management & medications as listed. AFFIRMATION: I  reviewed patient's history, previous & current medical problems & all Labs + testing. This includes chart prep even prior to the vosit. Various goals are discussed and multiple questions answered. Relevant concelling performed. Office follow up in six months.

## 2022-02-24 NOTE — PROGRESS NOTES
OFFICE PROGRESS NOTES      Humberto Salmon is a 81 y.o. male who has    CHIEF COMPLAINT AS FOLLOWS:  CHEST PAIN: Patient denies any C/O chest pains at this time.      SOB: No C/O SOB at this time.                 LEG EDEMA: No leg edema   PALPITATIONS: Denies any C/O Palpitations   DIZZINESS: No C/O Dizziness   SYNCOPE: None   OTHER/ ADDITIONAL COMPLAINTS:                                     HPI: Patient is here for F/U on his CAD, Arrhythmia, HTN & Dyslipidemia problems. CAD: Patient has known CAD. Had angioplasty / CABG, both  in the past.  Arrhythmia: Patient has known  Supraventricular  arrhythmia in the past.  HTN: Patient has known essential HTN. Has been treated with guideline recommended medical / physical/ diet therapy as stated below. Dyslipidemia: Patient has known mixed dyslipidemia. Has been treated with guideline recommended medical / physical/ diet therapy as stated below. Current Outpatient Medications   Medication Sig Dispense Refill    PRALUENT 75 MG/ML SOAJ injection pen INJECT 1 ML EVERY 14 DAYS. 2.24 mL 3    aspirin EC 81 MG EC tablet Take 1 tablet by mouth daily 90 tablet 1    ticagrelor (BRILINTA) 60 MG TABS tablet Take 1 tablet by mouth 2 times daily 60 tablet 1    pantoprazole (PROTONIX) 40 MG tablet Take 1 tablet by mouth 2 times daily (before meals) 30 tablet 1    metFORMIN (GLUCOPHAGE) 500 MG tablet Take 500 mg by mouth daily (with breakfast)      UNABLE TO FIND uses Neuralgia cream  Twice a day for feet pain      rOPINIRole (REQUIP XL) 2 MG extended release tablet Take 1 mg by mouth nightly      atorvastatin (LIPITOR) 40 MG tablet Take 1 tablet by mouth daily (Patient taking differently: Take 40 mg by mouth every morning ) 90 tablet 0    gabapentin (NEURONTIN) 100 MG capsule Take 100 mg by mouth 3 times daily      famotidine (PEPCID) 20 MG tablet Take 20 mg by mouth 2 times daily      multivitamin (OCUVITE) TABS per tablet Take 1 tablet by mouth daily.       losartan (COZAAR) 25 MG tablet Take 12.5 mg by mouth every morning 0.5 tablet daily       carvedilol (COREG) 6.25 MG tablet Take 6.25 mg by mouth 2 times daily (with meals).  nitroGLYCERIN (NITROSTAT) 0.4 MG SL tablet Place 1 tablet under the tongue every 5 minutes as needed for Chest pain (Patient not taking: Reported on 2/24/2022) 25 tablet 1    PRALUENT 75 MG/ML SOPN injection pen Inject 1 mL into the skin every 14 days 6 Pen 2     No current facility-administered medications for this visit. Allergies: Patient has no known allergies. Review of Systems:    Constitutional: Negative for diaphoresis and fatigue  Respiratory: Negative for shortness of breath  Cardiovascular: Negative for chest pain, dyspnea on exertion, claudication, edema, irregular heartbeat, murmur, palpitations or shortness of breath  Musculoskeletal: Negative for muscle pain, muscular weakness, negative for pain in arm and leg or swelling in foot and leg    Objective:  /88   Pulse 78   Ht 6' (1.829 m)   Wt 240 lb (108.9 kg)   BMI 32.55 kg/m²   Wt Readings from Last 3 Encounters:   02/24/22 240 lb (108.9 kg)   08/02/21 235 lb 3.2 oz (106.7 kg)   04/15/21 237 lb 6.4 oz (107.7 kg)     Body mass index is 32.55 kg/m². GENERAL - Alert, oriented, pleasant, in no apparent distress. EYES: No jaundice, no conjunctival pallor. Neck - Supple. No jugular venous distention noted. No carotid bruits. Cardiovascular  Normal S1 and S2 without obvious murmur or gallop. Extremities - No cyanosis, clubbing, or significant edema. Pulmonary  No respiratory distress. No wheezes or rales.       MEDICAL DECISION MAKING & DATA REVIEW:    Lab Review   Lab Results   Component Value Date    TROPONINT <0.010 04/09/2021    TROPONINT 0.011 04/08/2021     Lab Results   Component Value Date    PROBNP 756.8 04/08/2021     Lab Results   Component Value Date    INR 0.98 06/01/2018    INR 0.96 12/12/2016     Lab Results   Component Value Date LABA1C 6.5 (H) 12/11/2020     Lab Results   Component Value Date    WBC 5.2 04/09/2021    WBC 8.8 04/08/2021    HCT 42.6 04/09/2021    HCT 48.4 04/08/2021    MCV 87.7 04/09/2021    MCV 87.7 04/08/2021     04/09/2021     04/08/2021     Lab Results   Component Value Date    CHOL 120 02/12/2016    CHOL 113 11/20/2015    TRIG 86 05/15/2017    TRIG 71 02/12/2016    HDL 55 05/15/2017    HDL 51 02/12/2016    LDLCALC 57 05/15/2017    LDLDIRECT 67 02/12/2016    LDLDIRECT 59 11/20/2015     Lab Results   Component Value Date    ALT 10 04/08/2021    ALT 5 (L) 12/12/2020    AST 24 04/08/2021    AST 15 12/12/2020     BMP:    Lab Results   Component Value Date     04/08/2021     12/13/2020    K 4.4 04/08/2021    K 4.3 12/13/2020     04/08/2021    CL 99 12/13/2020    CO2 24 04/08/2021    CO2 28 12/13/2020    BUN 25 04/08/2021    BUN 12 12/13/2020    CREATININE 1.3 04/08/2021    CREATININE 1.0 12/13/2020     CMP:   Lab Results   Component Value Date     04/08/2021     12/13/2020    K 4.4 04/08/2021    K 4.3 12/13/2020     04/08/2021    CL 99 12/13/2020    CO2 24 04/08/2021    CO2 28 12/13/2020    BUN 25 04/08/2021    BUN 12 12/13/2020    CREATININE 1.3 04/08/2021    CREATININE 1.0 12/13/2020    PROT 6.2 04/08/2021    PROT 5.3 12/12/2020     No results found for: TSH, TSHHS    QUALITY MEASURES REVIEWED:  1.CAD:Patient is taking anti platelet agent:Yes  2. DYSLIPIDEMIA: Patient is on cholesterol lowering medication:Yes  3. Beta-Blocker therapy for CAD, if prior Myocardial Infarction:Yes   4. Counselled regarding smoking cessation. No   Patient does not Smoke. 5.Anticoagulation therapy (for A.Fib) No   Does Not have A.Fib.  6.Discussed weight management strategies. Assessment & Plan:  Primary / Secondary prevention is the goal by aggressive risk modification, healthy and therapeutic life style changes for cardiovascular risk reduction. CORONARY ARTERY DISEASE:Yes.  Patient's IMI finding on Cardiolite is old.   clinically stable. Patient is on optimal medical regimen ( see medication list above )  -  Patient is currently  mildly symptomatic from CAD.          - changes in  treatment:   no, ON asa & Coreg. Low dose Brilinta           - Testing ordered:  no  [place] classification: 1  Cardiolite 4/8/2021    abnormal stress test, mildy depressed LVEF, increased EDV, Myocardial    perfusion scan shows modrate size, severe intensity, non reversible    perfusion defect in inferior wall. HTN:well controlled on current medical regimen, see list above.              - changes in  treatment:   no, on Cozaar & Coreg   CARDIOMYOPATHY: None known   CONGESTIVE HEART FAILURE: NO KNOWN HISTORY.     VHD: No significant VHD noted  ECHO 4/8/2021   Left ventricular systolic function is abnormal.   Ejection fraction is visually estimated at 40-45%.   The inferoseptal wall segments appear hypokinetic.   Grade I diastolic dysfunction.   Aortic valve leaflets are somewhat thickened.   Mitral annular calcification is present.   No evidence of any pericardial effusion. DYSLIPIDEMIA: Patient's profile is at / near [facility] is low                                Tolerating current medical regimen wellyes, takes Lipitor & Praluent.                                                               See most recent Lab values in Labs section above. CAROTID ARTERY DISEASE: yes,                No symptoms described pertaining to Carotid artery disease               Up to date on non invasive testing .yes,                Patient is on Guidelines recommended therapy. yes,      ARRHYTHMIAS: h/o SVT. TESTS ORDERED: Carotid US     PREVIOUSLY ORDERED TESTS REVIEWED & DISCUSSED WITH THE PATIENT:     I personally reviewed & interpreted, all previously ordered tests as copied above. Latest Labs are pulled in to the note with dates.    Labs, specially in Reference to Lipid profile, Cardiac testing in the form of Echo ( dated: ), stress tests ( dated: ) & other relevant cardiac testing reviewed with patient & recommendations made based on assessment of the results. Discussed role of Cardiac risk factors & effects + treatment of co morbidities with patient & advised accordingly. MEDICATIONS: List of medications patient is currently taking is reviewed in detail with the patient & family member present. Discussed any side effects or problems taking the medication. Recommend Continue present management & medications as listed. AFFIRMATION: I  reviewed patient's history, previous & current medical problems & all Labs + testing. This includes chart prep even prior to the vosit. Various goals are discussed and multiple questions answered. Relevant concelling performed. Office follow up in six months.

## 2022-02-28 ENCOUNTER — PROCEDURE VISIT (OUTPATIENT)
Dept: CARDIOLOGY CLINIC | Age: 82
End: 2022-02-28
Payer: MEDICARE

## 2022-02-28 DIAGNOSIS — I77.9 BILATERAL CAROTID ARTERY DISEASE, UNSPECIFIED TYPE (HCC): ICD-10-CM

## 2022-02-28 DIAGNOSIS — Z98.62 S/P ANGIOPLASTY: ICD-10-CM

## 2022-02-28 DIAGNOSIS — I10 PRIMARY HYPERTENSION: ICD-10-CM

## 2022-02-28 DIAGNOSIS — Z95.1 S/P CABG X 3: ICD-10-CM

## 2022-02-28 DIAGNOSIS — I77.9 RIGHT-SIDED CAROTID ARTERY DISEASE, UNSPECIFIED TYPE (HCC): ICD-10-CM

## 2022-02-28 DIAGNOSIS — I25.10 CORONARY ARTERY DISEASE INVOLVING NATIVE CORONARY ARTERY OF NATIVE HEART WITHOUT ANGINA PECTORIS: ICD-10-CM

## 2022-02-28 DIAGNOSIS — E78.2 MIXED HYPERLIPIDEMIA: ICD-10-CM

## 2022-02-28 DIAGNOSIS — I47.1 PAROXYSMAL SUPRAVENTRICULAR TACHYCARDIA (HCC): ICD-10-CM

## 2022-02-28 PROCEDURE — 93880 EXTRACRANIAL BILAT STUDY: CPT | Performed by: INTERNAL MEDICINE

## 2022-03-12 ENCOUNTER — TELEPHONE (OUTPATIENT)
Dept: CARDIOLOGY CLINIC | Age: 82
End: 2022-03-12

## 2022-03-12 NOTE — TELEPHONE ENCOUNTER
2/28/22  Mild (0-49%) disease of the Bilateral proximal Internal carotid artery.    Normal vertebral flow     PATIENT VERBALLY UNDERSTOOD

## 2022-07-19 RX ORDER — ALIROCUMAB 75 MG/ML
75 INJECTION, SOLUTION SUBCUTANEOUS
Qty: 6 PEN | Refills: 3 | Status: SHIPPED | OUTPATIENT
Start: 2022-07-19

## 2023-03-15 ENCOUNTER — TELEPHONE (OUTPATIENT)
Dept: CARDIOLOGY CLINIC | Age: 83
End: 2023-03-15

## 2023-03-24 ENCOUNTER — OFFICE VISIT (OUTPATIENT)
Dept: CARDIOLOGY CLINIC | Age: 83
End: 2023-03-24
Payer: MEDICARE

## 2023-03-24 VITALS
HEART RATE: 69 BPM | WEIGHT: 244 LBS | HEIGHT: 72 IN | SYSTOLIC BLOOD PRESSURE: 156 MMHG | DIASTOLIC BLOOD PRESSURE: 70 MMHG | BODY MASS INDEX: 33.05 KG/M2

## 2023-03-24 DIAGNOSIS — I10 PRIMARY HYPERTENSION: ICD-10-CM

## 2023-03-24 DIAGNOSIS — Z95.1 S/P CABG X 3: ICD-10-CM

## 2023-03-24 DIAGNOSIS — I21.4 NON-ST ELEVATION MYOCARDIAL INFARCTION (NSTEMI) (HCC): ICD-10-CM

## 2023-03-24 DIAGNOSIS — I47.1 PAROXYSMAL SUPRAVENTRICULAR TACHYCARDIA (HCC): ICD-10-CM

## 2023-03-24 DIAGNOSIS — I77.9 BILATERAL CAROTID ARTERY DISEASE, UNSPECIFIED TYPE (HCC): ICD-10-CM

## 2023-03-24 DIAGNOSIS — E78.2 MIXED HYPERLIPIDEMIA: ICD-10-CM

## 2023-03-24 DIAGNOSIS — Z98.62 S/P ANGIOPLASTY: ICD-10-CM

## 2023-03-24 DIAGNOSIS — I25.10 CORONARY ARTERY DISEASE INVOLVING NATIVE CORONARY ARTERY OF NATIVE HEART WITHOUT ANGINA PECTORIS: Primary | ICD-10-CM

## 2023-03-24 PROCEDURE — 3077F SYST BP >= 140 MM HG: CPT | Performed by: INTERNAL MEDICINE

## 2023-03-24 PROCEDURE — 3078F DIAST BP <80 MM HG: CPT | Performed by: INTERNAL MEDICINE

## 2023-03-24 PROCEDURE — 99214 OFFICE O/P EST MOD 30 MIN: CPT | Performed by: INTERNAL MEDICINE

## 2023-03-24 PROCEDURE — 93000 ELECTROCARDIOGRAM COMPLETE: CPT | Performed by: INTERNAL MEDICINE

## 2023-03-24 PROCEDURE — 1123F ACP DISCUSS/DSCN MKR DOCD: CPT | Performed by: INTERNAL MEDICINE

## 2023-03-24 RX ORDER — CARVEDILOL 6.25 MG/1
12.5 TABLET ORAL 2 TIMES DAILY WITH MEALS
Qty: 60 TABLET | Refills: 5 | Status: SHIPPED | OUTPATIENT
Start: 2023-03-24

## 2023-03-24 RX ORDER — NITROGLYCERIN 0.4 MG/1
0.4 TABLET SUBLINGUAL EVERY 5 MIN PRN
Qty: 25 TABLET | Refills: 1 | Status: SHIPPED | OUTPATIENT
Start: 2023-03-24

## 2023-03-24 NOTE — PATIENT INSTRUCTIONS
in to the note with dates. Labs, specially in Reference to Lipid profile, Cardiac testing in the form of Echo ( dated: ), stress tests ( dated: ) & other relevant cardiac testing reviewed with patient & recommendations made based on assessment of the results. Discussed role of Cardiac risk factors & effects + treatment of co morbidities with patient & advised accordingly. MEDICATIONS: List of medications patient is currently taking is reviewed in detail with the patient & family member present. Discussed any side effects or problems taking the medication. Recommend : Increase Coreg to 12.5 mg BID. AFFIRMATION: I reviewed patient's history, previous & current medical problems & all Labs + testing. This includes chart prep even prior to the vosit. Various goals are discussed and multiple questions answered. Relevant concelling performed. Office follow up for test results.

## 2023-03-24 NOTE — LETTER
March 24, 2023      Edward Garcia MD  [address] [place] [id number]      Patient: Alexandria Jones [id number]   MR Number: [id number]   YOB: 1940   Date of Visit: 3/24/2023       Dear Elizabeth Contreras: Thank you for referring Gianfranco Rodas to me for evaluation/treatment. Below are the relevant portions of my assessment and plan of care. If you have questions, please do not hesitate to call me. I look forward to following Charles Thorpe along with you.     Sincerely,        Josiah Blake MD

## 2023-03-24 NOTE — PROGRESS NOTES
release tablet Take 1 mg by mouth nightly      atorvastatin (LIPITOR) 40 MG tablet Take 1 tablet by mouth daily (Patient taking differently: Take 40 mg by mouth every morning) 90 tablet 0    gabapentin (NEURONTIN) 100 MG capsule Take 100 mg by mouth 3 times daily      famotidine (PEPCID) 20 MG tablet Take 20 mg by mouth 2 times daily      multivitamin (OCUVITE) TABS per tablet Take 1 tablet by mouth daily. losartan (COZAAR) 25 MG tablet Take 12.5 mg by mouth every morning 0.5 tablet daily       carvedilol (COREG) 6.25 MG tablet Take 6.25 mg by mouth 2 times daily (with meals). PRALUENT 75 MG/ML SOPN injection pen Inject 1 mL into the skin every 14 days 6 Pen 2     No current facility-administered medications for this visit. Allergies: Patient has no known allergies. Review of Systems:    Constitutional: Negative for diaphoresis and fatigue  Respiratory: Negative for shortness of breath  Cardiovascular: Negative for chest pain, dyspnea on exertion, claudication, edema, irregular heartbeat, murmur, palpitations or shortness of breath  Musculoskeletal: Negative for muscle pain, muscular weakness, negative for pain in arm and leg or swelling in foot and leg    Objective:  BP (!) 156/70 (Site: Left Upper Arm, Position: Sitting, Cuff Size: Large Adult)   Pulse 69   Ht 6' (1.829 m)   Wt 244 lb (110.7 kg)   BMI 33.09 kg/m²   Wt Readings from Last 3 Encounters:   03/24/23 244 lb (110.7 kg)   02/24/22 240 lb (108.9 kg)   08/02/21 235 lb 3.2 oz (106.7 kg)     Body mass index is 33.09 kg/m². GENERAL - Alert, oriented, pleasant, in no apparent distress. EYES: No jaundice, no conjunctival pallor. Neck - Supple. No jugular venous distention noted. No carotid bruits. Cardiovascular - Normal S1 and S2 without obvious murmur or gallop. Extremities - No cyanosis, clubbing, or significant edema. Pulmonary - No respiratory distress. No wheezes or rales.       MEDICAL DECISION MAKING & DATA REVIEW:    Lab

## 2023-04-07 ENCOUNTER — PROCEDURE VISIT (OUTPATIENT)
Dept: CARDIOLOGY CLINIC | Age: 83
End: 2023-04-07

## 2023-04-07 DIAGNOSIS — I21.4 NON-ST ELEVATION MYOCARDIAL INFARCTION (NSTEMI) (HCC): ICD-10-CM

## 2023-04-07 DIAGNOSIS — Z98.62 S/P ANGIOPLASTY: ICD-10-CM

## 2023-04-07 DIAGNOSIS — I47.1 PAROXYSMAL SUPRAVENTRICULAR TACHYCARDIA (HCC): ICD-10-CM

## 2023-04-07 DIAGNOSIS — R07.89 OTHER CHEST PAIN: Primary | ICD-10-CM

## 2023-04-07 DIAGNOSIS — Z95.1 S/P CABG X 3: ICD-10-CM

## 2023-04-07 DIAGNOSIS — I77.9 BILATERAL CAROTID ARTERY DISEASE, UNSPECIFIED TYPE (HCC): ICD-10-CM

## 2023-04-07 DIAGNOSIS — I25.10 CORONARY ARTERY DISEASE INVOLVING NATIVE CORONARY ARTERY OF NATIVE HEART WITHOUT ANGINA PECTORIS: ICD-10-CM

## 2023-04-07 DIAGNOSIS — I10 PRIMARY HYPERTENSION: ICD-10-CM

## 2023-04-07 DIAGNOSIS — E78.2 MIXED HYPERLIPIDEMIA: ICD-10-CM

## 2023-04-07 LAB
LV EF: 48 %
LVEF MODALITY: NORMAL

## 2023-04-11 NOTE — BRIEF OP NOTE
Department of Orthopedic Surgery  Brief Operative Report                                                                                            BRIEF OPERATIVE NOTE    Patient name  Hitesh Santana  MRN   [id number]    Date of Procedure 1/31/2020      Pre-operative Diagnosis: 1. Left shoulder rotator cuff arthopathy    Post-operative Diagnosis:  Same     Procedure:    1. Left reverse shoulder arthroplasty    Surgeon:     Mayte Paez MD     Assistant(s):     Lianna Calderon PA-C    Anesthesia:     General endotrachial anesthesia and Regional    Estimated blood loss:   300 ml    Specimens:     None taken    Complications:    None    Condition:     Stable      See dictated operative report for full details. Patient tolerated the procedure well and returned to recovery in stable condition. Postoperative orders and instructions have been provided.     Mayte Paez MD normal saline

## 2023-06-06 ENCOUNTER — APPOINTMENT (OUTPATIENT)
Dept: CT IMAGING | Age: 83
End: 2023-06-06
Payer: MEDICARE

## 2023-06-06 ENCOUNTER — HOSPITAL ENCOUNTER (OUTPATIENT)
Age: 83
Setting detail: OBSERVATION
Discharge: HOME | End: 2023-06-07
Attending: EMERGENCY MEDICINE | Admitting: INTERNAL MEDICINE
Payer: MEDICARE

## 2023-06-06 ENCOUNTER — APPOINTMENT (OUTPATIENT)
Dept: GENERAL RADIOLOGY | Age: 83
End: 2023-06-06
Payer: MEDICARE

## 2023-06-06 DIAGNOSIS — R07.9 CHEST PAIN, UNSPECIFIED TYPE: Primary | ICD-10-CM

## 2023-06-06 DIAGNOSIS — I71.00 DISSECTION OF AORTA, UNSPECIFIED PORTION OF AORTA (HCC): ICD-10-CM

## 2023-06-06 LAB
ALBUMIN SERPL-MCNC: 3.9 GM/DL (ref 3.4–5)
ALP BLD-CCNC: 136 IU/L (ref 40–129)
ALT SERPL-CCNC: 12 U/L (ref 10–40)
ANION GAP SERPL CALCULATED.3IONS-SCNC: 10 MMOL/L (ref 4–16)
AST SERPL-CCNC: 20 IU/L (ref 15–37)
BASOPHILS ABSOLUTE: 0 K/CU MM
BASOPHILS RELATIVE PERCENT: 0.3 % (ref 0–1)
BILIRUB SERPL-MCNC: 0.6 MG/DL (ref 0–1)
BUN SERPL-MCNC: 22 MG/DL (ref 6–23)
CALCIUM SERPL-MCNC: 8.7 MG/DL (ref 8.3–10.6)
CHLORIDE BLD-SCNC: 99 MMOL/L (ref 99–110)
CO2: 23 MMOL/L (ref 21–32)
CREAT SERPL-MCNC: 1.2 MG/DL (ref 0.9–1.3)
DIFFERENTIAL TYPE: ABNORMAL
EKG ATRIAL RATE: 82 BPM
EKG DIAGNOSIS: NORMAL
EKG P AXIS: 63 DEGREES
EKG P-R INTERVAL: 292 MS
EKG Q-T INTERVAL: 382 MS
EKG QRS DURATION: 92 MS
EKG QTC CALCULATION (BAZETT): 446 MS
EKG R AXIS: -19 DEGREES
EKG T AXIS: 36 DEGREES
EKG VENTRICULAR RATE: 82 BPM
EOSINOPHILS ABSOLUTE: 0.1 K/CU MM
EOSINOPHILS RELATIVE PERCENT: 1.8 % (ref 0–3)
GFR SERPL CREATININE-BSD FRML MDRD: >60 ML/MIN/1.73M2
GLUCOSE SERPL-MCNC: 274 MG/DL (ref 70–99)
HCT VFR BLD CALC: 43.4 % (ref 42–52)
HEMOGLOBIN: 14.6 GM/DL (ref 13.5–18)
IMMATURE NEUTROPHIL %: 0.3 % (ref 0–0.43)
LYMPHOCYTES ABSOLUTE: 1 K/CU MM
LYMPHOCYTES RELATIVE PERCENT: 16.4 % (ref 24–44)
MCH RBC QN AUTO: 29.6 PG (ref 27–31)
MCHC RBC AUTO-ENTMCNC: 33.6 % (ref 32–36)
MCV RBC AUTO: 87.9 FL (ref 78–100)
MONOCYTES ABSOLUTE: 0.7 K/CU MM
MONOCYTES RELATIVE PERCENT: 10.6 % (ref 0–4)
NUCLEATED RBC %: 0 %
PDW BLD-RTO: 13.3 % (ref 11.7–14.9)
PLATELET # BLD: 139 K/CU MM (ref 140–440)
PMV BLD AUTO: 10 FL (ref 7.5–11.1)
POTASSIUM SERPL-SCNC: 4.5 MMOL/L (ref 3.5–5.1)
PRO-BNP: 320.6 PG/ML
RBC # BLD: 4.94 M/CU MM (ref 4.6–6.2)
SEGMENTED NEUTROPHILS ABSOLUTE COUNT: 4.3 K/CU MM
SEGMENTED NEUTROPHILS RELATIVE PERCENT: 70.6 % (ref 36–66)
SODIUM BLD-SCNC: 132 MMOL/L (ref 135–145)
TOTAL IMMATURE NEUTOROPHIL: 0.02 K/CU MM
TOTAL NUCLEATED RBC: 0 K/CU MM
TOTAL PROTEIN: 6.5 GM/DL (ref 6.4–8.2)
TROPONIN T: <0.01 NG/ML
WBC # BLD: 6.1 K/CU MM (ref 4–10.5)

## 2023-06-06 PROCEDURE — G0378 HOSPITAL OBSERVATION PER HR: HCPCS

## 2023-06-06 PROCEDURE — C1769 GUIDE WIRE: HCPCS

## 2023-06-06 PROCEDURE — 6370000000 HC RX 637 (ALT 250 FOR IP): Performed by: NURSE PRACTITIONER

## 2023-06-06 PROCEDURE — 6360000002 HC RX W HCPCS

## 2023-06-06 PROCEDURE — C1894 INTRO/SHEATH, NON-LASER: HCPCS

## 2023-06-06 PROCEDURE — 92937 PRQ TRLUML REVSC CAB GRF 1: CPT | Performed by: INTERNAL MEDICINE

## 2023-06-06 PROCEDURE — 93005 ELECTROCARDIOGRAM TRACING: CPT | Performed by: INTERNAL MEDICINE

## 2023-06-06 PROCEDURE — 6360000004 HC RX CONTRAST MEDICATION

## 2023-06-06 PROCEDURE — 6370000000 HC RX 637 (ALT 250 FOR IP): Performed by: INTERNAL MEDICINE

## 2023-06-06 PROCEDURE — 36415 COLL VENOUS BLD VENIPUNCTURE: CPT

## 2023-06-06 PROCEDURE — 2709999900 HC NON-CHARGEABLE SUPPLY

## 2023-06-06 PROCEDURE — C1887 CATHETER, GUIDING: HCPCS

## 2023-06-06 PROCEDURE — 84484 ASSAY OF TROPONIN QUANT: CPT

## 2023-06-06 PROCEDURE — 83880 ASSAY OF NATRIURETIC PEPTIDE: CPT

## 2023-06-06 PROCEDURE — 85025 COMPLETE CBC W/AUTO DIFF WBC: CPT

## 2023-06-06 PROCEDURE — 2500000003 HC RX 250 WO HCPCS

## 2023-06-06 PROCEDURE — 2140000000 HC CCU INTERMEDIATE R&B

## 2023-06-06 PROCEDURE — 93459 L HRT ART/GRFT ANGIO: CPT | Performed by: INTERNAL MEDICINE

## 2023-06-06 PROCEDURE — 6370000000 HC RX 637 (ALT 250 FOR IP): Performed by: EMERGENCY MEDICINE

## 2023-06-06 PROCEDURE — 6360000002 HC RX W HCPCS: Performed by: INTERNAL MEDICINE

## 2023-06-06 PROCEDURE — 2580000003 HC RX 258: Performed by: NURSE PRACTITIONER

## 2023-06-06 PROCEDURE — 6370000000 HC RX 637 (ALT 250 FOR IP)

## 2023-06-06 PROCEDURE — 99285 EMERGENCY DEPT VISIT HI MDM: CPT

## 2023-06-06 PROCEDURE — 93010 ELECTROCARDIOGRAM REPORT: CPT | Performed by: INTERNAL MEDICINE

## 2023-06-06 PROCEDURE — 94761 N-INVAS EAR/PLS OXIMETRY MLT: CPT

## 2023-06-06 PROCEDURE — 93459 L HRT ART/GRFT ANGIO: CPT

## 2023-06-06 PROCEDURE — 71045 X-RAY EXAM CHEST 1 VIEW: CPT

## 2023-06-06 PROCEDURE — 6360000002 HC RX W HCPCS: Performed by: NURSE PRACTITIONER

## 2023-06-06 PROCEDURE — APPSS30 APP SPLIT SHARED TIME 16-30 MINUTES

## 2023-06-06 PROCEDURE — 2580000003 HC RX 258: Performed by: INTERNAL MEDICINE

## 2023-06-06 PROCEDURE — C1874 STENT, COATED/COV W/DEL SYS: HCPCS

## 2023-06-06 PROCEDURE — C9604 PERC D-E COR REVASC T CABG S: HCPCS

## 2023-06-06 PROCEDURE — 80053 COMPREHEN METABOLIC PANEL: CPT

## 2023-06-06 PROCEDURE — 93005 ELECTROCARDIOGRAM TRACING: CPT | Performed by: EMERGENCY MEDICINE

## 2023-06-06 PROCEDURE — 99223 1ST HOSP IP/OBS HIGH 75: CPT | Performed by: INTERNAL MEDICINE

## 2023-06-06 RX ORDER — ONDANSETRON 2 MG/ML
4 INJECTION INTRAMUSCULAR; INTRAVENOUS EVERY 6 HOURS PRN
Status: DISCONTINUED | OUTPATIENT
Start: 2023-06-06 | End: 2023-06-07 | Stop reason: HOSPADM

## 2023-06-06 RX ORDER — POLYETHYLENE GLYCOL 3350 17 G
2 POWDER IN PACKET (EA) ORAL
Status: DISCONTINUED | OUTPATIENT
Start: 2023-06-06 | End: 2023-06-07 | Stop reason: HOSPADM

## 2023-06-06 RX ORDER — HYDRALAZINE HYDROCHLORIDE 20 MG/ML
20 INJECTION INTRAMUSCULAR; INTRAVENOUS ONCE
Status: COMPLETED | OUTPATIENT
Start: 2023-06-06 | End: 2023-06-06

## 2023-06-06 RX ORDER — ACETAMINOPHEN 325 MG/1
650 TABLET ORAL EVERY 4 HOURS PRN
Status: DISCONTINUED | OUTPATIENT
Start: 2023-06-06 | End: 2023-06-07 | Stop reason: HOSPADM

## 2023-06-06 RX ORDER — ROPINIROLE 2 MG/1
2 TABLET, FILM COATED, EXTENDED RELEASE ORAL NIGHTLY
Status: DISCONTINUED | OUTPATIENT
Start: 2023-06-06 | End: 2023-06-06

## 2023-06-06 RX ORDER — POLYETHYLENE GLYCOL 3350 17 G/17G
17 POWDER, FOR SOLUTION ORAL DAILY PRN
Status: DISCONTINUED | OUTPATIENT
Start: 2023-06-06 | End: 2023-06-07 | Stop reason: HOSPADM

## 2023-06-06 RX ORDER — ONDANSETRON 4 MG/1
4 TABLET, ORALLY DISINTEGRATING ORAL EVERY 8 HOURS PRN
Status: DISCONTINUED | OUTPATIENT
Start: 2023-06-06 | End: 2023-06-07 | Stop reason: HOSPADM

## 2023-06-06 RX ORDER — ATORVASTATIN CALCIUM 40 MG/1
40 TABLET, FILM COATED ORAL EVERY MORNING
Status: DISCONTINUED | OUTPATIENT
Start: 2023-06-07 | End: 2023-06-06 | Stop reason: SDUPTHER

## 2023-06-06 RX ORDER — PANTOPRAZOLE SODIUM 40 MG/1
40 TABLET, DELAYED RELEASE ORAL
Status: DISCONTINUED | OUTPATIENT
Start: 2023-06-06 | End: 2023-06-07 | Stop reason: HOSPADM

## 2023-06-06 RX ORDER — SODIUM CHLORIDE 0.9 % (FLUSH) 0.9 %
5-40 SYRINGE (ML) INJECTION EVERY 12 HOURS SCHEDULED
Status: DISCONTINUED | OUTPATIENT
Start: 2023-06-06 | End: 2023-06-07 | Stop reason: HOSPADM

## 2023-06-06 RX ORDER — ACETAMINOPHEN 650 MG/1
650 SUPPOSITORY RECTAL EVERY 6 HOURS PRN
Status: DISCONTINUED | OUTPATIENT
Start: 2023-06-06 | End: 2023-06-06 | Stop reason: SDUPTHER

## 2023-06-06 RX ORDER — SODIUM CHLORIDE 9 MG/ML
INJECTION, SOLUTION INTRAVENOUS PRN
Status: DISCONTINUED | OUTPATIENT
Start: 2023-06-06 | End: 2023-06-07 | Stop reason: HOSPADM

## 2023-06-06 RX ORDER — ROPINIROLE 1 MG/1
2 TABLET, FILM COATED ORAL NIGHTLY
COMMUNITY

## 2023-06-06 RX ORDER — ENOXAPARIN SODIUM 100 MG/ML
30 INJECTION SUBCUTANEOUS 2 TIMES DAILY
Status: DISCONTINUED | OUTPATIENT
Start: 2023-06-06 | End: 2023-06-07 | Stop reason: HOSPADM

## 2023-06-06 RX ORDER — CARVEDILOL 6.25 MG/1
12.5 TABLET ORAL 2 TIMES DAILY WITH MEALS
Status: DISCONTINUED | OUTPATIENT
Start: 2023-06-06 | End: 2023-06-07 | Stop reason: HOSPADM

## 2023-06-06 RX ORDER — LOSARTAN POTASSIUM 25 MG/1
12.5 TABLET ORAL EVERY MORNING
Status: DISCONTINUED | OUTPATIENT
Start: 2023-06-07 | End: 2023-06-07 | Stop reason: HOSPADM

## 2023-06-06 RX ORDER — NITROGLYCERIN 0.4 MG/1
0.4 TABLET SUBLINGUAL EVERY 5 MIN PRN
Status: DISCONTINUED | OUTPATIENT
Start: 2023-06-06 | End: 2023-06-07 | Stop reason: HOSPADM

## 2023-06-06 RX ORDER — ACETAMINOPHEN 325 MG/1
650 TABLET ORAL EVERY 6 HOURS PRN
Status: DISCONTINUED | OUTPATIENT
Start: 2023-06-06 | End: 2023-06-06 | Stop reason: SDUPTHER

## 2023-06-06 RX ORDER — GABAPENTIN 100 MG/1
100 CAPSULE ORAL 3 TIMES DAILY
Status: DISCONTINUED | OUTPATIENT
Start: 2023-06-06 | End: 2023-06-07 | Stop reason: HOSPADM

## 2023-06-06 RX ORDER — SODIUM CHLORIDE 0.9 % (FLUSH) 0.9 %
10 SYRINGE (ML) INJECTION PRN
Status: DISCONTINUED | OUTPATIENT
Start: 2023-06-06 | End: 2023-06-07 | Stop reason: HOSPADM

## 2023-06-06 RX ORDER — ATROPINE SULFATE 0.1 MG/ML
INJECTION INTRAVENOUS
Status: DISCONTINUED
Start: 2023-06-06 | End: 2023-06-06 | Stop reason: WASHOUT

## 2023-06-06 RX ORDER — AMLODIPINE BESYLATE 5 MG/1
5 TABLET ORAL DAILY
Status: DISCONTINUED | OUTPATIENT
Start: 2023-06-06 | End: 2023-06-07 | Stop reason: HOSPADM

## 2023-06-06 RX ORDER — ASPIRIN 81 MG/1
324 TABLET, CHEWABLE ORAL ONCE
Status: COMPLETED | OUTPATIENT
Start: 2023-06-06 | End: 2023-06-06

## 2023-06-06 RX ORDER — SODIUM CHLORIDE 0.9 % (FLUSH) 0.9 %
5-40 SYRINGE (ML) INJECTION PRN
Status: DISCONTINUED | OUTPATIENT
Start: 2023-06-06 | End: 2023-06-07 | Stop reason: HOSPADM

## 2023-06-06 RX ORDER — FAMOTIDINE 20 MG/1
20 TABLET, FILM COATED ORAL 2 TIMES DAILY
Status: DISCONTINUED | OUTPATIENT
Start: 2023-06-06 | End: 2023-06-07 | Stop reason: HOSPADM

## 2023-06-06 RX ORDER — ASPIRIN 81 MG/1
81 TABLET, CHEWABLE ORAL DAILY
Status: DISCONTINUED | OUTPATIENT
Start: 2023-06-07 | End: 2023-06-06 | Stop reason: SDUPTHER

## 2023-06-06 RX ORDER — ASPIRIN 81 MG/1
81 TABLET, CHEWABLE ORAL DAILY
Status: DISCONTINUED | OUTPATIENT
Start: 2023-06-07 | End: 2023-06-07 | Stop reason: HOSPADM

## 2023-06-06 RX ORDER — ATORVASTATIN CALCIUM 40 MG/1
80 TABLET, FILM COATED ORAL NIGHTLY
Status: DISCONTINUED | OUTPATIENT
Start: 2023-06-06 | End: 2023-06-07 | Stop reason: HOSPADM

## 2023-06-06 RX ORDER — ROPINIROLE 1 MG/1
2 TABLET, FILM COATED ORAL NIGHTLY
Status: DISCONTINUED | OUTPATIENT
Start: 2023-06-06 | End: 2023-06-07 | Stop reason: HOSPADM

## 2023-06-06 RX ADMIN — TICAGRELOR 90 MG: 90 TABLET ORAL at 21:54

## 2023-06-06 RX ADMIN — GABAPENTIN 100 MG: 100 CAPSULE ORAL at 21:54

## 2023-06-06 RX ADMIN — ROPINIROLE HYDROCHLORIDE 2 MG: 1 TABLET, FILM COATED ORAL at 21:54

## 2023-06-06 RX ADMIN — SODIUM CHLORIDE, PRESERVATIVE FREE 10 ML: 5 INJECTION INTRAVENOUS at 21:56

## 2023-06-06 RX ADMIN — FAMOTIDINE 20 MG: 20 TABLET ORAL at 21:54

## 2023-06-06 RX ADMIN — SODIUM CHLORIDE, PRESERVATIVE FREE 10 ML: 5 INJECTION INTRAVENOUS at 21:54

## 2023-06-06 RX ADMIN — ATORVASTATIN CALCIUM 80 MG: 40 TABLET, FILM COATED ORAL at 21:54

## 2023-06-06 RX ADMIN — AMLODIPINE BESYLATE 5 MG: 5 TABLET ORAL at 14:53

## 2023-06-06 RX ADMIN — ENOXAPARIN SODIUM 30 MG: 100 INJECTION SUBCUTANEOUS at 21:54

## 2023-06-06 RX ADMIN — PANTOPRAZOLE SODIUM 40 MG: 40 TABLET, DELAYED RELEASE ORAL at 17:51

## 2023-06-06 RX ADMIN — ASPIRIN 81 MG CHEWABLE TABLET 324 MG: 81 TABLET CHEWABLE at 10:57

## 2023-06-06 RX ADMIN — HYDRALAZINE HYDROCHLORIDE 20 MG: 20 INJECTION INTRAMUSCULAR; INTRAVENOUS at 17:51

## 2023-06-06 RX ADMIN — CARVEDILOL 12.5 MG: 6.25 TABLET, FILM COATED ORAL at 17:51

## 2023-06-06 ASSESSMENT — PAIN SCALES - GENERAL: PAINLEVEL_OUTOF10: 6

## 2023-06-06 ASSESSMENT — PAIN - FUNCTIONAL ASSESSMENT: PAIN_FUNCTIONAL_ASSESSMENT: 0-10

## 2023-06-06 ASSESSMENT — PAIN DESCRIPTION - DESCRIPTORS: DESCRIPTORS: TIGHTNESS

## 2023-06-06 ASSESSMENT — PAIN DESCRIPTION - ORIENTATION: ORIENTATION: ANTERIOR;MID

## 2023-06-06 ASSESSMENT — PAIN DESCRIPTION - LOCATION: LOCATION: CHEST

## 2023-06-06 NOTE — ED NOTES
ED TO INPATIENT SBAR HANDOFF    Patient Name: Anu Bailey UDDOJULES   :  1940  81 y.o. Preferred Name  Love Faust  Family/Caregiver Present yes   Restraints no   C-SSRS: Risk of Suicide: No Risk  Sitter no   Sepsis Risk Score Sepsis Risk Score: 1.59      Situation  Chief Complaint   Patient presents with    Chest Pain     C/o mid sternal chest pain that radiates up to bilateral sides of neck, states pain radiates up his neck and feels worse with exertion. Onset chest pain 4 weeks ago and has seen his cardiologist and had a stress test. Pt states recently has become fatigued with any exertion and is usually very active. Pt and wife reports sx have become worse the past 3 days    Cough     Productive cough with clear sputum described     Brief Description of Patient's Condition: pt c/o chest pain that radiates to bilateral neck, worse with exertion. Recently seen by cardiologist in April and stress test. Pt c/o chest pain and recent extreme fatigue worse the past 2 days  Mental Status: oriented and alert  Arrived from: home    Imaging:   XR CHEST PORTABLE   Final Result   No acute cardiopulmonary process.            Abnormal labs:   Abnormal Labs Reviewed   CBC WITH AUTO DIFFERENTIAL - Abnormal; Notable for the following components:       Result Value    Platelets 259 (*)     Segs Relative 70.6 (*)     Lymphocytes % 16.4 (*)     Monocytes % 10.6 (*)     All other components within normal limits   COMPREHENSIVE METABOLIC PANEL - Abnormal; Notable for the following components:    Sodium 132 (*)     Glucose 274 (*)     Alkaline Phosphatase 136 (*)     All other components within normal limits   BRAIN NATRIURETIC PEPTIDE - Abnormal; Notable for the following components:    Pro-.6 (*)     All other components within normal limits       Background  History:   Past Medical History:   Diagnosis Date    Abnormal angiogram 10/04/2016    carotid - mod stenosis on right    Arthritis     Left shoulder, left foot    Bulging

## 2023-06-06 NOTE — ED PROVIDER NOTES
Emergency Department Encounter    Patient: Brandi Wolf  MRN: [id number]  : 1940  Date of Evaluation: 2023  ED Provider:  Eboni Roland MD    Triage Chief Complaint:   Chest Pain (C/o mid sternal chest pain that radiates up to bilateral sides of neck, states pain radiates up his neck and feels worse with exertion. Onset chest pain 4 weeks ago and has seen his cardiologist and had a stress test. Pt states recently has become fatigued with any exertion and is usually very active. Pt and wife reports sx have become worse the past 3 days) and Cough (Productive cough with clear sputum described)    [place]:  Brandi Wolf is a 81 y.o. male that presents with complaint of pain, midsternal radiating in bilateral sides of the neck with exertion. Having pain in the last couple months, but was not able to have stress test.  Having continued exertional initially reactive. Reported to be \"pills around\" outside the garden things can usually get the all day, and last several days not able to PRIOR TO THAT WITH IBUPROFEN, LAST RECEIVED ANTISPASMODIC. HAVING COUGH, SPUTUM. NO FEVERS. SYMPTOMS APPROPRIATELY WITH EXERTION. IS A HISTORY OF MULTIPLE CARDIAC STENTS, FOLLOWS WITH DR. WANG AT [facility]. HAS NOTED MILD LEG SWELLING    ROS - see HPI, below listed is current ROS at time of my eval:  10 systems reviewed and negative except as above.      Past Medical History:   Diagnosis Date    Abnormal angiogram 10/04/2016    carotid - mod stenosis on right    Arthritis     Left shoulder, left foot    Bulging of lumbar intervertebral disc     x3    CAD (coronary artery disease)     Dr. Alexx Cordova about 4 or 5 heart stents- never got any papers on that\"    Diabetes mellitus ([facility] [address]     Type II - Follows with PCP\"just started me on Metformin 5 months ago\" per pt on 12/3/2020    Foot drop, left foot     hx drop foot left - \"from my sciatic nerve but I exercised it and now dont really bother me no more\"    Full

## 2023-06-06 NOTE — FLOWSHEET NOTE
Attempted to phone report to 3N, receiving RN [facility] cath lab had already phoned report. Pt transported on cart/monitor per RNx2 to 3117.  #6F RFA sheath intact and to pressure bag and site free of bleeding/hematoma at time of transfer

## 2023-06-06 NOTE — CONSULTS
INPATIENT CARDIOLOGY CONSULT NOTE         Reason for consultation:  CP    Chief Complaint   Patient presents with    Chest Pain     C/o mid sternal chest pain that radiates up to bilateral sides of neck, states pain radiates up his neck and feels worse with exertion. Onset chest pain 4 weeks ago and has seen his cardiologist and had a stress test. Pt states recently has become fatigued with any exertion and is usually very active. Pt and wife reports sx have become worse the past 3 days    Cough     Productive cough with clear sputum described       History of present illness:Mane is a 81 y. o.year old who is admitted with   Chief Complaint   Patient presents with    Chest Pain     C/o mid sternal chest pain that radiates up to bilateral sides of neck, states pain radiates up his neck and feels worse with exertion. Onset chest pain 4 weeks ago and has seen his cardiologist and had a stress test. Pt states recently has become fatigued with any exertion and is usually very active. Pt and wife reports sx have become worse the past 3 days    Cough     Productive cough with clear sputum described     Patient is a pleasant 81-year-old gentleman with prior medical history significant for coronary disease s/p CABG, history of PCI post CABG, essential hypertension hyperlipidemia who follows up with Dr. Angle Monk as outpatient presents to the hospital with chief complaint of chest pain. Patient mentioned that over the past 2 months he has been experiencing crescendo angina with exertion. He underwent stress MPI April 2023 which showed inferior lateral infarction with jessie-infarct ischemia, however did not follow-up with primary cardiologist.  Over the last 2 days he has been experiencing worsening chest pain radiating to bilateral jaws. Patient also complains of shortness of breath and can hardly walk 10-15 steps before having to stop to breathe. Denies any palpitation    Stress MPI:       Abnormal study.

## 2023-06-06 NOTE — H&P
drop, left foot     hx drop foot left - \"from my sciatic nerve but I exercised it and now dont really bother me no more\"    Full dentures     upper and lower    H/O chest pain 10/2016    Last chest pain: 12/2019    H/O echocardiogram 10/23/2019    EF 45-50%, Mild MR. Hypokinesis of the Basal infero-septal segment. Hearing aid worn     bilateral/[place] both ears    History of nuclear stress test 10/23/2019    perfusion scan shows moderate size, severe intensity, non reversible perfusion defect in inferior wall suggesting inferior wall infarction. History of PTCA 10/04/2016    ptca svg-diag    HTN (hypertension)     Follows with PCP    Hx of carotid artery stenosis     Hyperlipemia     Macular degeneration     Bilateral eyes (worse in right)- uses magnifying glass to read    MI (myocardial infarction) ([facility] [address] 2006, nov 2016    sees [facility] mohammed\"they said at the end of the heart cath had heart attack when they were pulling the wires out in 2016\"    Restless leg syndrome     Takes Gabapentin    Sciatic nerve pain, left 10/2019    leg    Sleep apnea     Uses CPAP    Wears glasses      PSHX:  has a past surgical history that includes Coronary angioplasty with stent (10/2016); hernia repair (1962); hernia repair (1991); Elbow fracture surgery (Left, 1996); eye surgery (Bilateral, 2013); Carotid endarterectomy (Right, 12/14/2016); Coronary artery bypass graft (11/27/2006); Colonoscopy (2007); Tonsillectomy (1950's); Shoulder Arthroplasty (Left, 1/31/2020); Total knee arthroplasty (Left, 12/10/2020); Endoscopy, colon, diagnostic (03/17/2021); and Upper gastrointestinal endoscopy (N/A, 3/17/2021). Allergies: No Known Allergies  Fam HX: family history includes Diabetes in his brother; High Blood Pressure in his mother.   Soc HX:   Social History     Socioeconomic History    Marital status:      Spouse name: None    Number of children: None    Years of education: None    Highest education level: None   Tobacco Use

## 2023-06-07 VITALS
HEART RATE: 68 BPM | OXYGEN SATURATION: 94 % | WEIGHT: 240 LBS | SYSTOLIC BLOOD PRESSURE: 144 MMHG | BODY MASS INDEX: 32.51 KG/M2 | RESPIRATION RATE: 18 BRPM | HEIGHT: 72 IN | TEMPERATURE: 97.5 F | DIASTOLIC BLOOD PRESSURE: 65 MMHG

## 2023-06-07 LAB
ANION GAP SERPL CALCULATED.3IONS-SCNC: 12 MMOL/L (ref 4–16)
BUN SERPL-MCNC: 21 MG/DL (ref 6–23)
CALCIUM SERPL-MCNC: 8.2 MG/DL (ref 8.3–10.6)
CHLORIDE BLD-SCNC: 102 MMOL/L (ref 99–110)
CO2: 20 MMOL/L (ref 21–32)
CREAT SERPL-MCNC: 1.1 MG/DL (ref 0.9–1.3)
GFR SERPL CREATININE-BSD FRML MDRD: >60 ML/MIN/1.73M2
GLUCOSE SERPL-MCNC: 226 MG/DL (ref 70–99)
HCT VFR BLD CALC: 42.3 % (ref 42–52)
HEMOGLOBIN: 13.9 GM/DL (ref 13.5–18)
LV EF: 50 %
LVEF MODALITY: NORMAL
MCH RBC QN AUTO: 29.4 PG (ref 27–31)
MCHC RBC AUTO-ENTMCNC: 32.9 % (ref 32–36)
MCV RBC AUTO: 89.4 FL (ref 78–100)
PDW BLD-RTO: 13.3 % (ref 11.7–14.9)
PLATELET # BLD: 134 K/CU MM (ref 140–440)
PMV BLD AUTO: 10 FL (ref 7.5–11.1)
POTASSIUM SERPL-SCNC: 3.8 MMOL/L (ref 3.5–5.1)
RBC # BLD: 4.73 M/CU MM (ref 4.6–6.2)
SODIUM BLD-SCNC: 134 MMOL/L (ref 135–145)
WBC # BLD: 6.1 K/CU MM (ref 4–10.5)

## 2023-06-07 PROCEDURE — 6360000002 HC RX W HCPCS: Performed by: NURSE PRACTITIONER

## 2023-06-07 PROCEDURE — 2580000003 HC RX 258: Performed by: INTERNAL MEDICINE

## 2023-06-07 PROCEDURE — 94664 DEMO&/EVAL PT USE INHALER: CPT

## 2023-06-07 PROCEDURE — 94150 VITAL CAPACITY TEST: CPT

## 2023-06-07 PROCEDURE — 6370000000 HC RX 637 (ALT 250 FOR IP): Performed by: INTERNAL MEDICINE

## 2023-06-07 PROCEDURE — 85027 COMPLETE CBC AUTOMATED: CPT

## 2023-06-07 PROCEDURE — 93312 ECHO TRANSESOPHAGEAL: CPT

## 2023-06-07 PROCEDURE — G0378 HOSPITAL OBSERVATION PER HR: HCPCS

## 2023-06-07 PROCEDURE — 99233 SBSQ HOSP IP/OBS HIGH 50: CPT | Performed by: INTERNAL MEDICINE

## 2023-06-07 PROCEDURE — 36415 COLL VENOUS BLD VENIPUNCTURE: CPT

## 2023-06-07 PROCEDURE — 80048 BASIC METABOLIC PNL TOTAL CA: CPT

## 2023-06-07 PROCEDURE — 94761 N-INVAS EAR/PLS OXIMETRY MLT: CPT

## 2023-06-07 PROCEDURE — 6370000000 HC RX 637 (ALT 250 FOR IP): Performed by: NURSE PRACTITIONER

## 2023-06-07 PROCEDURE — APPSS30 APP SPLIT SHARED TIME 16-30 MINUTES

## 2023-06-07 RX ORDER — AMLODIPINE BESYLATE 5 MG/1
5 TABLET ORAL DAILY
Qty: 30 TABLET | Refills: 3 | Status: SHIPPED | OUTPATIENT
Start: 2023-06-08

## 2023-06-07 RX ADMIN — PANTOPRAZOLE SODIUM 40 MG: 40 TABLET, DELAYED RELEASE ORAL at 05:45

## 2023-06-07 RX ADMIN — GABAPENTIN 100 MG: 100 CAPSULE ORAL at 14:41

## 2023-06-07 RX ADMIN — CARVEDILOL 12.5 MG: 6.25 TABLET, FILM COATED ORAL at 10:16

## 2023-06-07 RX ADMIN — GABAPENTIN 100 MG: 100 CAPSULE ORAL at 10:16

## 2023-06-07 RX ADMIN — TICAGRELOR 90 MG: 90 TABLET ORAL at 10:16

## 2023-06-07 RX ADMIN — SODIUM CHLORIDE, PRESERVATIVE FREE 10 ML: 5 INJECTION INTRAVENOUS at 10:16

## 2023-06-07 RX ADMIN — FAMOTIDINE 20 MG: 20 TABLET ORAL at 10:16

## 2023-06-07 RX ADMIN — LOSARTAN POTASSIUM 12.5 MG: 25 TABLET, FILM COATED ORAL at 10:16

## 2023-06-07 RX ADMIN — ASPIRIN 81 MG 81 MG: 81 TABLET ORAL at 10:16

## 2023-06-07 RX ADMIN — AMLODIPINE BESYLATE 5 MG: 5 TABLET ORAL at 10:16

## 2023-06-07 RX ADMIN — ENOXAPARIN SODIUM 30 MG: 100 INJECTION SUBCUTANEOUS at 10:17

## 2023-06-07 ASSESSMENT — PAIN SCALES - GENERAL: PAINLEVEL_OUTOF10: 0

## 2023-06-07 NOTE — DISCHARGE SUMMARY
Discharge:   BP (!) 144/65   Pulse 68   Temp 97.5 °F (36.4 °C) (Oral)   Resp 18   Ht 6' (1.829 m)   Wt 240 lb (108.9 kg)   SpO2 94%   BMI 32.55 kg/m²       Physical Exam:   General: NAD  Eyes: EOMI  ENT: neck supple  Cardiovascular: Regular rate. All pulses palpable   Respiratory: Clear to auscultation  Gastrointestinal: Soft, non tender  Genitourinary: no suprapubic tenderness  Musculoskeletal: No edema  Skin: warm, dry, bruising to right groin, no hematoma  Neuro: Alert. Oriented. Psych: Mood appropriate. Labs and Imaging   XR CHEST PORTABLE    Result Date: 6/6/2023  EXAMINATION: ONE XRAY VIEW OF THE CHEST 6/6/2023 10:07 am COMPARISON: 04/08/2021 HISTORY: ORDERING SYSTEM PROVIDED HISTORY: chest pain TECHNOLOGIST PROVIDED HISTORY: Reason for exam:->chest pain Reason for Exam: chest pain FINDINGS: Cardiac silhouette is upper normal limits. Pulmonary vasculature is upper normal limits. The lungs are clear. No pneumothorax is identified. Left shoulder prosthesis is noted. Post sternotomy changes are noted     No acute cardiopulmonary process.        CBC:   Recent Labs     06/06/23  0954 06/07/23  0026   WBC 6.1 6.1   HGB 14.6 13.9   * 134*     BMP:    Recent Labs     06/06/23  0954 06/07/23  0026   * 134*   K 4.5 3.8   CL 99 102   CO2 23 20*   BUN 22 21   CREATININE 1.2 1.1   GLUCOSE 274* 226*     Hepatic:   Recent Labs     06/06/23  0954   AST 20   ALT 12   BILITOT 0.6   ALKPHOS 136*     Lipids:   Lab Results   Component Value Date/Time    CHOL 120 02/12/2016 12:06 PM    HDL 55 05/15/2017 12:00 AM    TRIG 86 05/15/2017 12:00 AM     Hemoglobin A1C:   Lab Results   Component Value Date/Time    LABA1C 6.5 12/11/2020 01:21 PM     TSH: No results found for: TSH  Troponin:   Lab Results   Component Value Date/Time    TROPONINT <0.010 06/06/2023 10:10 PM    TROPONINT <0.010 06/06/2023 02:02 PM    TROPONINT <0.010 06/06/2023 09:54 AM     Lactic Acid: No results for input(s): LACTA in the last 72

## 2023-06-07 NOTE — PROGRESS NOTES
, ok to pull right femoral sheath.
BP improved to 151/65 one hour after iv hydralazine.    ACT @ 5738- 786
Dr. Edwards Congress notified of /88.  Give IV hydralazine 20mg IV and recheck BP and ACT in 1 hour , sheath must not be removed until BP stabilizes per Dr. Grace Tavera
IS placed at bedside. Patient currently on phone.
Order received from Dr. Maddie Whitmore to DC R Femoral sheath. . Pt informed of sheath removal process. Atropine and NS bolus pulled from med room. Francisco Renteria RN and This RN at bedside. Distal pulses palpable. Blood return noted from catheter and line flushes without difficulty. Suture cut. Pt instructed to take deep breath. Line pulled. No resistance. Blood return noted at site. Sheath pulled at 2226. Immediate manual pressure held for 20 minutes. Hemostasis achieved at 2246. No hematoma, ecchymosis or oozing at site. Clear Tegaderm dressing applied. Pt tolerated well. Atropine and NS Bolus was not used so it was returned to the med room.
Patient instructed and educated on Net 263. Patient able to do 2500 ml. Vital capacity. Patient's goal is 3000ml.  Electronically signed by Florencia Domingo RCP on 6/7/2023 at 11:42 AM
Patient seen and examined by Dr. Justo Nelson and I.    Cardiology consulted for chest pain that has been ongoing for a couple of months. Patient follows with Dr. Diane Brown. History of CABG and multiple PCI. Troponin negative, BNP within normal limits for age    Had recent stress test in April showing inferior-lateral infarct with mild jessie-infarct ischemia. Educated patient on need for left heart catheterization. Procedure was explained in detail as well as risks and benefits. Patient voiced understanding and was agreeable to undergoing procedure. Consent was obtained. Plan for left heart catheterization today.     Full note to follow    Alka Kemp PA-C 06/06/23 2:03 PM
This nurse spoke to Ashanti from ICU about ACT and sheath pull on this patient. Ashanti aware that ACT needs to be checked at 2000. On coming nurseMike RN also updated on patient. This nurse updated patients wife about ICU nurse coming to check ACT and assess for sheath pull.
heard.    No friction rub. No gallop. Comments: right groinsite dressing is clean dry and intact, site is soft without hematoma or bleeding noted. Patient does have bruising noted but is stable  Pulmonary:      Effort: Pulmonary effort is normal.      Breath sounds: Normal breath sounds. No rales. Chest:      Chest wall: No tenderness. Comments: Sternotomy scar noted  Abdominal:      Palpations: Abdomen is soft. Tenderness: There is no abdominal tenderness. Musculoskeletal:      Right lower leg: No edema. Left lower leg: No edema. Skin:     General: Skin is warm and dry. Capillary Refill: Capillary refill takes less than 2 seconds. Findings: No rash. Comments: Skin turgor brisk   Neurological:      Mental Status: He is alert and oriented to person, place, and time. Mental status is at baseline. Psychiatric:         Behavior: Behavior is cooperative. Thought Content:  Thought content normal.         Judgment: Judgment normal.        Medications:    carvedilol  12.5 mg Oral BID WC    famotidine  20 mg Oral BID    gabapentin  100 mg Oral TID    losartan  12.5 mg Oral QAM    pantoprazole  40 mg Oral BID AC    sodium chloride flush  5-40 mL IntraVENous 2 times per day    enoxaparin  30 mg SubCUTAneous BID    rOPINIRole  2 mg Oral Nightly    amLODIPine  5 mg Oral Daily    sodium chloride flush  5-40 mL IntraVENous 2 times per day    aspirin  81 mg Oral Daily    ticagrelor  90 mg Oral BID    atorvastatin  80 mg Oral Nightly      sodium chloride      sodium chloride       nitroGLYCERIN, sodium chloride flush, sodium chloride, ondansetron **OR** ondansetron, polyethylene glycol, nicotine polacrilex, iopamidol, sodium chloride flush, sodium chloride, acetaminophen    Lab Data:  CBC:   Recent Labs     06/06/23  0954 06/07/23  0026   WBC 6.1 6.1   HGB 14.6 13.9   HCT 43.4 42.3   MCV 87.9 89.4   * 134*     BMP:   Recent Labs     06/06/23  0954 06/07/23  0026   *

## 2023-06-07 NOTE — DISCHARGE INSTRUCTIONS
Advised patient on limitations.  -No lifting over 20 pounds for 7-10 days.  -Avoid driving for 35-73 hours  -No water submersion (bath, hot tub, swimming), but okay to shower. Some bruising is common and should not be alarming. Advised patient to return to emergency department ASAP if they notice bleeding bright red blood. Potential life threatening condition. Informed to contact the office if they notice new fever, excessive warmth, redness, swelling, palpable mass, or pus draining from site.

## 2023-06-07 NOTE — CARE COORDINATION
.CM met with pt for d/c planning. Introduced self and updated white board. Spouse at bedside. Pt lives with spouse and is independent with ADL's. Pt's wife provides his transportation. He has a PCP, has insurance, and is able to afford his medication. Pt has a walker and a cane that he does not use. [facility] offered and pt declined. Pt denies any d/c needs at this time. D/c plan is home with spouse, no needs. Notify CM if any d/c needs arise. TE       06/07/23 2426   Service Assessment   Patient Orientation Alert and Oriented   Cognition Alert   History Provided By Patient   Primary Caregiver Self   Support Systems Spouse/Significant Other   Patient's Healthcare Decision Maker is: Legal Next of Kin  (SELF)   PCP Verified by CM Yes   Prior Functional Level Independent in ADLs/IADLs   Current Functional Level Independent in ADLs/IADLs   Can patient return to prior living arrangement Yes   Ability to make needs known: Good   Family able to assist with home care needs: Yes   Would you like for me to discuss the discharge plan with any other family members/significant others, and if so, who?  No   Financial Resources Medicare   Community Resources None   Social/Functional History   Lives With [facility] Mireille Gonzales, luther;Cane

## 2023-06-09 LAB
EKG ATRIAL RATE: 71 BPM
EKG DIAGNOSIS: NORMAL
EKG P AXIS: 34 DEGREES
EKG P-R INTERVAL: 314 MS
EKG Q-T INTERVAL: 408 MS
EKG QRS DURATION: 108 MS
EKG QTC CALCULATION (BAZETT): 443 MS
EKG R AXIS: 142 DEGREES
EKG T AXIS: 102 DEGREES
EKG VENTRICULAR RATE: 71 BPM

## 2023-06-20 DIAGNOSIS — Z95.1 S/P CABG X 3: ICD-10-CM

## 2023-06-20 DIAGNOSIS — E78.2 MIXED HYPERLIPIDEMIA: Primary | ICD-10-CM

## 2023-06-21 ENCOUNTER — OFFICE VISIT (OUTPATIENT)
Dept: CARDIOLOGY CLINIC | Age: 83
End: 2023-06-21
Payer: MEDICARE

## 2023-06-21 VITALS
WEIGHT: 230.8 LBS | BODY MASS INDEX: 31.26 KG/M2 | HEART RATE: 81 BPM | SYSTOLIC BLOOD PRESSURE: 130 MMHG | DIASTOLIC BLOOD PRESSURE: 72 MMHG | HEIGHT: 72 IN

## 2023-06-21 DIAGNOSIS — I10 PRIMARY HYPERTENSION: ICD-10-CM

## 2023-06-21 DIAGNOSIS — Z95.1 S/P CABG X 3: ICD-10-CM

## 2023-06-21 DIAGNOSIS — Z98.62 S/P ANGIOPLASTY: ICD-10-CM

## 2023-06-21 DIAGNOSIS — I65.21 STENOSIS OF RIGHT CAROTID ARTERY: ICD-10-CM

## 2023-06-21 DIAGNOSIS — E78.2 MIXED HYPERLIPIDEMIA: ICD-10-CM

## 2023-06-21 DIAGNOSIS — I47.1 PAROXYSMAL SUPRAVENTRICULAR TACHYCARDIA (HCC): ICD-10-CM

## 2023-06-21 DIAGNOSIS — I21.4 NON-ST ELEVATION MYOCARDIAL INFARCTION (NSTEMI) (HCC): ICD-10-CM

## 2023-06-21 DIAGNOSIS — I25.10 CORONARY ARTERY DISEASE INVOLVING NATIVE CORONARY ARTERY OF NATIVE HEART WITHOUT ANGINA PECTORIS: Primary | ICD-10-CM

## 2023-06-21 PROCEDURE — 99213 OFFICE O/P EST LOW 20 MIN: CPT | Performed by: INTERNAL MEDICINE

## 2023-06-21 PROCEDURE — 1123F ACP DISCUSS/DSCN MKR DOCD: CPT | Performed by: INTERNAL MEDICINE

## 2023-06-21 PROCEDURE — 93000 ELECTROCARDIOGRAM COMPLETE: CPT | Performed by: INTERNAL MEDICINE

## 2023-06-21 PROCEDURE — 3075F SYST BP GE 130 - 139MM HG: CPT | Performed by: INTERNAL MEDICINE

## 2023-06-21 PROCEDURE — 3078F DIAST BP <80 MM HG: CPT | Performed by: INTERNAL MEDICINE

## 2023-06-21 NOTE — PATIENT INSTRUCTIONS
ARRHYTHMIAS: H/O SVT. TESTS ORDERED:none this visit. Patient does not want to go to the rehab. PREVIOUSLY ORDERED TESTS REVIEWED & DISCUSSED WITH THE PATIENT:     I personally reviewed & interpreted, all previously ordered tests as copied above. Latest Labs are pulled in to the note with dates. Labs, specially in Reference to Lipid profile, Cardiac testing in the form of Echo ( dated: ), stress tests ( dated: ) & other relevant cardiac testing reviewed with patient & recommendations made based on assessment of the results. Discussed role of Cardiac risk factors & effects + treatment of co morbidities with patient & advised accordingly. MEDICATIONS: List of medications patient is currently taking is reviewed in detail with the patient & family member present. Discussed any side effects or problems taking the medication. Recommend Continue present management & medications as listed. AFFIRMATION: I spent at least 20 minutes of time reviewing patient's history, previous & current medical problems & all Labs + testing. This includes chart prep even prior to the vosit. Various goals are discussed and multiple questions answered. Relevant concelling performed. Office follow up in six months.

## 2023-06-21 NOTE — PROGRESS NOTES
effusion. DYSLIPIDEMIA: Patient's profile is at / near Goal.yes,                                 HDL is low                                Tolerating current medical regimen wellyes, takes Lipitor & Praluent. See most recent Lab values in Labs section above. CAROTID ARTERY DISEASE: yes,                No symptoms described pertaining to Carotid artery disease               Up to date on non invasive testing .yes,                Patient is on Guidelines recommended therapy. yes,       ARRHYTHMIAS: H/O SVT. TESTS ORDERED:none this visit. Patient does not want to go to the rehab. PREVIOUSLY ORDERED TESTS REVIEWED & DISCUSSED WITH THE PATIENT:     I personally reviewed & interpreted, all previously ordered tests as copied above. Latest Labs are pulled in to the note with dates. Labs, specially in Reference to Lipid profile, Cardiac testing in the form of Echo ( dated: ), stress tests ( dated: ) & other relevant cardiac testing reviewed with patient & recommendations made based on assessment of the results. Discussed role of Cardiac risk factors & effects + treatment of co morbidities with patient & advised accordingly. MEDICATIONS: List of medications patient is currently taking is reviewed in detail with the patient & family member present. Discussed any side effects or problems taking the medication. Recommend Continue present management & medications as listed. AFFIRMATION: I spent at least 20 minutes of time reviewing patient's history, previous & current medical problems & all Labs + testing. This includes chart prep even prior to the vosit. Various goals are discussed and multiple questions answered. Relevant concelling performed. Office follow up in six months.

## 2023-06-29 RX ORDER — AMLODIPINE BESYLATE 5 MG/1
TABLET ORAL
Qty: 30 TABLET | Refills: 3 | OUTPATIENT
Start: 2023-06-29

## 2023-08-12 ENCOUNTER — APPOINTMENT (OUTPATIENT)
Dept: CT IMAGING | Age: 83
End: 2023-08-12
Payer: MEDICARE

## 2023-08-12 ENCOUNTER — HOSPITAL ENCOUNTER (EMERGENCY)
Age: 83
Discharge: HOME | End: 2023-08-12
Payer: MEDICARE

## 2023-08-12 VITALS
TEMPERATURE: 97.9 F | HEART RATE: 78 BPM | HEIGHT: 72 IN | DIASTOLIC BLOOD PRESSURE: 64 MMHG | WEIGHT: 225 LBS | SYSTOLIC BLOOD PRESSURE: 136 MMHG | RESPIRATION RATE: 19 BRPM | BODY MASS INDEX: 30.48 KG/M2 | OXYGEN SATURATION: 96 %

## 2023-08-12 DIAGNOSIS — M54.2 NECK PAIN: Primary | ICD-10-CM

## 2023-08-12 PROCEDURE — 6370000000 HC RX 637 (ALT 250 FOR IP): Performed by: NURSE PRACTITIONER

## 2023-08-12 PROCEDURE — 72125 CT NECK SPINE W/O DYE: CPT

## 2023-08-12 PROCEDURE — 99284 EMERGENCY DEPT VISIT MOD MDM: CPT

## 2023-08-12 RX ORDER — HYDROCODONE BITARTRATE AND ACETAMINOPHEN 5; 325 MG/1; MG/1
1 TABLET ORAL ONCE
Status: COMPLETED | OUTPATIENT
Start: 2023-08-12 | End: 2023-08-12

## 2023-08-12 RX ORDER — CYCLOBENZAPRINE HCL 10 MG
10 TABLET ORAL NIGHTLY PRN
Qty: 10 TABLET | Refills: 0 | Status: SHIPPED | OUTPATIENT
Start: 2023-08-12 | End: 2023-08-22

## 2023-08-12 RX ORDER — TRAMADOL HYDROCHLORIDE 50 MG/1
50 TABLET ORAL EVERY 6 HOURS PRN
Qty: 20 TABLET | Refills: 0 | Status: SHIPPED | OUTPATIENT
Start: 2023-08-12 | End: 2023-08-17

## 2023-08-12 RX ADMIN — HYDROCODONE BITARTRATE AND ACETAMINOPHEN 1 TABLET: 5; 325 TABLET ORAL at 17:40

## 2023-08-12 ASSESSMENT — PAIN SCALES - GENERAL
PAINLEVEL_OUTOF10: 10
PAINLEVEL_OUTOF10: 10

## 2023-08-12 ASSESSMENT — PAIN - FUNCTIONAL ASSESSMENT: PAIN_FUNCTIONAL_ASSESSMENT: 0-10

## 2023-08-12 ASSESSMENT — PAIN DESCRIPTION - LOCATION: LOCATION: NECK

## 2023-08-12 NOTE — DISCHARGE INSTRUCTIONS
Activity as tolerated, follow-up with primary care physician in 3 to 7 days. Return to ER for worsening symptoms or any other concerns.

## 2023-08-12 NOTE — ED PROVIDER NOTES
**ADVANCED PRACTICE PROVIDER, I HAVE EVALUATED THIS PATIENT**        [facility]      Pt Name: Margarita Coles  CVP:[id number]  [address] [facility] 1940  Date of evaluation: 8/12/2023  Provider: ERENDIRA Hernandez - CNP      Chief Complaint:    Chief Complaint   Patient presents with    Neck Pain         Nursing Notes, Past Medical Hx, Past Surgical Hx, Social Hx, Allergies, and Family Hx were all reviewed and agreed with or any disagreements were addressed in the HPI.    HPI: (Location, Duration, Timing, Severity, Quality, Assoc Sx, Context, Modifying factors)    Chief Complaint of neck pain    This is a  81 y.o. male who presents  with neck pain. Patient reported constant left-sided neck pain for 2 weeks. Patient stated he woke up 1 morning with the left neck pain. Patient stated turning head towards left side exacerbate to the pain. Patient denied recently injury. No numbness tingling sensation on the left. Denied of chest pain. No shortness of breath. PastMedical/Surgical History:      Diagnosis Date    Abnormal angiogram 10/04/2016    carotid - mod stenosis on right    Arthritis     Left shoulder, left foot    Bulging of lumbar intervertebral disc     x3    CAD (coronary artery disease)     Dr. Neela Gee about 4 or 5 heart stents- never got any papers on that\"    Diabetes mellitus ([address])     Type II - Follows with PCP\"just started me on Metformin 5 months ago\" per pt on 12/3/2020    Foot drop, left foot     hx drop foot left - \"from my sciatic nerve but I exercised it and now dont really bother me no more\"    Full dentures     upper and lower    H/O chest pain 10/2016    Last chest pain: 12/2019    H/O echocardiogram 10/23/2019    EF 45-50%, Mild MR. Hypokinesis of the Basal infero-septal segment.     H/O percutaneous left heart catheterization 06/06/2023    severe LAD and diagonal and LCX occlusion, 2 grafts patent, will be discharged home with some pain medicine and a muscle relaxant. Activity as tolerated. Follow-up with primary care physician in 3 to 7 days. Return to ER for worsening symptoms or any other concerns. PROCEDURES:   Procedures    None    Patient was given:  Medications   HYDROcodone-acetaminophen (NORCO) 5-325 MG per tablet 1 tablet (has no administration in time range)       The patient tolerated their visit well. I evaluated the patient. The physician was available for consultation as needed. The patient and / or the family were informed of the results of any tests, a time was given to answer questions, a plan was proposed and they agreed with plan. CLINICAL IMPRESSION:  1. Neck pain        DISPOSITION Decision To Discharge 08/12/2023 05:12:44 PM      PATIENT REFERRED TO:  Neyda Akins MD  [address] [place] [id number]  [phone]            DISCHARGE MEDICATIONS:  New Prescriptions    CYCLOBENZAPRINE (FLEXERIL) 10 MG TABLET    Take 1 tablet by mouth nightly as needed for Muscle spasms    TRAMADOL (ULTRAM) 50 MG TABLET    Take 1 tablet by mouth every 6 hours as needed for Pain for up to 5 days. Intended supply: 5 days.  Take lowest dose possible to manage pain Max Daily Amount: 200 mg       DISCONTINUED MEDICATIONS:  Discontinued Medications    No medications on file              (Please note the MDM and HPI sections of this note were completed with a voice recognition program.  Efforts were made to edit the dictations but occasionally words are mis-transcribed.)    Electronically signed, ERENDIRA Romero CNP,          ERENDIRA Romero CNP  08/12/23 0190

## 2023-08-12 NOTE — ED NOTES
Pt d/c reviewed and all questions answered, printed prescriptions provided by CNP, pt d/c home with wife tiffany.      Dante Gonzales RN  08/12/23 6783

## 2023-08-28 RX ORDER — ALIROCUMAB 75 MG/ML
75 INJECTION, SOLUTION SUBCUTANEOUS
Qty: 6 ADJUSTABLE DOSE PRE-FILLED PEN SYRINGE | Refills: 3 | Status: SHIPPED | OUTPATIENT
Start: 2023-08-28

## 2023-12-28 ENCOUNTER — OFFICE VISIT (OUTPATIENT)
Dept: CARDIOLOGY CLINIC | Age: 83
End: 2023-12-28
Payer: MEDICARE

## 2023-12-28 VITALS
SYSTOLIC BLOOD PRESSURE: 132 MMHG | HEART RATE: 72 BPM | BODY MASS INDEX: 29.42 KG/M2 | HEIGHT: 72 IN | DIASTOLIC BLOOD PRESSURE: 70 MMHG | WEIGHT: 217.2 LBS

## 2023-12-28 DIAGNOSIS — E78.2 MIXED HYPERLIPIDEMIA: ICD-10-CM

## 2023-12-28 DIAGNOSIS — I47.10 PAROXYSMAL SUPRAVENTRICULAR TACHYCARDIA: ICD-10-CM

## 2023-12-28 DIAGNOSIS — I25.10 CORONARY ARTERY DISEASE INVOLVING NATIVE CORONARY ARTERY OF NATIVE HEART WITHOUT ANGINA PECTORIS: Primary | ICD-10-CM

## 2023-12-28 DIAGNOSIS — I10 PRIMARY HYPERTENSION: ICD-10-CM

## 2023-12-28 DIAGNOSIS — Z98.62 S/P ANGIOPLASTY: ICD-10-CM

## 2023-12-28 DIAGNOSIS — I21.4 NON-ST ELEVATION MYOCARDIAL INFARCTION (NSTEMI) (HCC): ICD-10-CM

## 2023-12-28 DIAGNOSIS — Z95.1 S/P CABG X 3: ICD-10-CM

## 2023-12-28 DIAGNOSIS — R93.89 ABNORMAL CAROTID ULTRASOUND: ICD-10-CM

## 2023-12-28 PROCEDURE — 1123F ACP DISCUSS/DSCN MKR DOCD: CPT | Performed by: INTERNAL MEDICINE

## 2023-12-28 PROCEDURE — 3075F SYST BP GE 130 - 139MM HG: CPT | Performed by: INTERNAL MEDICINE

## 2023-12-28 PROCEDURE — 3078F DIAST BP <80 MM HG: CPT | Performed by: INTERNAL MEDICINE

## 2023-12-28 PROCEDURE — 99213 OFFICE O/P EST LOW 20 MIN: CPT | Performed by: INTERNAL MEDICINE

## 2023-12-28 NOTE — PROGRESS NOTES
respiratory distress. No wheezes or rales.       MEDICAL DECISION MAKING & DATA REVIEW:    Lab Review   Lab Results   Component Value Date/Time    TROPONINT <0.010 06/06/2023 10:10 PM    TROPONINT <0.010 06/06/2023 02:02 PM     Lab Results   Component Value Date/Time    PROBNP 320.6 06/06/2023 09:54 AM    PROBNP 756.8 04/08/2021 09:52 AM     Lab Results   Component Value Date    INR 0.98 06/01/2018    INR 0.96 12/12/2016     Lab Results   Component Value Date    LABA1C 6.5 (H) 12/11/2020     Lab Results   Component Value Date    WBC 6.1 06/07/2023    WBC 6.1 06/06/2023    HGB 13.9 06/07/2023    HGB 14.6 06/06/2023    HCT 42.3 06/07/2023    HCT 43.4 06/06/2023    MCV 89.4 06/07/2023    MCV 87.9 06/06/2023     (L) 06/07/2023     (L) 06/06/2023     Lab Results   Component Value Date    CHOL 120 02/12/2016    CHOL 113 11/20/2015    TRIG 86 05/15/2017    TRIG 71 02/12/2016    HDL 55 05/15/2017    HDL 51 02/12/2016    LDLCALC 57 05/15/2017    LDLDIRECT 67 02/12/2016    LDLDIRECT 59 11/20/2015     Lab Results   Component Value Date    ALT 12 06/06/2023    ALT 10 04/08/2021    AST 20 06/06/2023    AST 24 04/08/2021     BMP:    Lab Results   Component Value Date/Time     06/07/2023 12:26 AM     06/06/2023 09:54 AM    K 3.8 06/07/2023 12:26 AM    K 4.5 06/06/2023 09:54 AM     06/07/2023 12:26 AM    CL 99 06/06/2023 09:54 AM    CO2 20 06/07/2023 12:26 AM    CO2 23 06/06/2023 09:54 AM    BUN 21 06/07/2023 12:26 AM    BUN 22 06/06/2023 09:54 AM    CREATININE 1.1 06/07/2023 12:26 AM    CREATININE 1.2 06/06/2023 09:54 AM     CMP:   Lab Results   Component Value Date/Time     06/07/2023 12:26 AM     06/06/2023 09:54 AM    K 3.8 06/07/2023 12:26 AM    K 4.5 06/06/2023 09:54 AM     06/07/2023 12:26 AM    CL 99 06/06/2023 09:54 AM    CO2 20 06/07/2023 12:26 AM    CO2 23 06/06/2023 09:54 AM    BUN 21 06/07/2023 12:26 AM    BUN 22 06/06/2023 09:54 AM    CREATININE 1.1 06/07/2023 12:26 AM

## 2023-12-28 NOTE — PATIENT INSTRUCTIONS
CORONARY ARTERY DISEASE:Yes. Had Recurrence of symptoms, went to the hospital had SVBG  PCI. Patient is feeling better now. Patient's IMI finding on Cardiolite is old. clinically stable. Patient is on optimal medical regimen ( see medication list above )  -  Patient is currently  mildly symptomatic from CAD. - changes in  treatment:   no, on ASA& Coreg. Low dose Brilinta           - Testing ordered:  no  [facility] classification: 1  Cardiolite 4/8/2021    abnormal stress test, mildy depressed LVEF, increased EDV, Myocardial    perfusion scan shows modrate size, severe intensity, non reversible    perfusion defect in inferior wall. 6/6/2023 CATH / PCI   severe LAD and diagonal and LCX occlusion, 2 grafts patent, SVG   to diagonal occlusion of 80% stenosis noted, s/p PCI with CRISTOBAL     HTN: Not well controlled on current medical regimen, see list above. - changes in  treatment: Yes, on Cozaar & Coreg. CARDIOMYOPATHY: None known   CONGESTIVE HEART FAILURE: NO KNOWN HISTORY.        VHD: No significant VHD noted  ECHO 4/8/2021   Left ventricular systolic function is abnormal.   Ejection fraction is visually estimated at 40-45%. The inferoseptal wall segments appear hypokinetic. Grade I diastolic dysfunction. Aortic valve leaflets are somewhat thickened. Mitral annular calcification is present. No evidence of any pericardial effusion. DYSLIPIDEMIA: Patient's profile is at / near Goal.yes,                                 HDL is low                                Tolerating current medical regimen wellyes, takes Lipitor & Praluent. See most recent Lab values in Labs section above. CAROTID ARTERY DISEASE: yes,                No symptoms described pertaining to Carotid artery disease               Up to date on non invasive testing .yes,                Patient is on Guidelines recommended therapy. yes,

## 2024-06-05 ENCOUNTER — TELEPHONE (OUTPATIENT)
Dept: CARDIOLOGY CLINIC | Age: 84
End: 2024-06-05

## 2024-06-05 NOTE — TELEPHONE ENCOUNTER
Cardiologist: Dr. Quiñonez  Surgeon: Dr. Chandler  Surgery: Minimally Invasive L2-5 Lami- medial facetectomies & foremin. & left L4-5 transforaminal lumbar interbody fusion  Surgery/Procedure should be done in the hospital setting    _____ yes    _____  no    Anesthesia: General  Date: 6/17/2024  Fax# [phone]  # [phone]    Last OV 12/28/2023 w/Khang    CORONARY ARTERY DISEASE:Yes. Had Recurrence of symptoms, went to the hospital had SVBG  PCI. Patient is feeling better now.  Patient's IMI finding on Cardiolite is old.   clinically stable. Patient is on optimal medical regimen ( see medication list above )  -  Patient is currently  mildly symptomatic from CAD.   HTN: Not well controlled on current medical regimen, see list above.              - changes in  treatment: Yes, on Cozaar & Coreg.      CARDIOMYOPATHY: None known   CONGESTIVE HEART FAILURE: NO KNOWN HISTORY.        VHD: No significant VHD noted  DYSLIPIDEMIA: Patient's profile is at / near Goal.yes,                                 HDL is low                                Tolerating current medical regimen wellyes, takes Lipitor & Praluent.                                     Last EKG- 6/21/2023      NM- 4/7/2023   Abnormal study.   Inferolateral wall MI with small amount of jessie infarct Ischemic zone.   No significant reversible ischemia noted.   Mild global Hypokinesis noted with low normal LV function. LVEF is 48 %.      Echo- 6/7/2023   Left ventricular systolic function is low normal.   Ejection fraction is visually estimated at 50%.   Inferolateral wall segment appears hypokinetic.   Mild left ventricular hypertrophy.   Grade I diastolic dysfunction.   No significant valvular disease noted.   No evidence of any pericardial effusion.    Cath- 6/6/2023   severe LAD and diagonal and LCX occlusion, 2 grafts patent, SVG   to diagonal occlusion of 80% stenosis noted, s/p PCI with CRISTOBAL      CABG- 2006      Brilinta    Aspirin

## 2024-07-02 ENCOUNTER — OFFICE VISIT (OUTPATIENT)
Dept: CARDIOLOGY CLINIC | Age: 84
End: 2024-07-02
Payer: MEDICARE

## 2024-07-02 VITALS
HEIGHT: 72 IN | DIASTOLIC BLOOD PRESSURE: 68 MMHG | BODY MASS INDEX: 31.56 KG/M2 | SYSTOLIC BLOOD PRESSURE: 136 MMHG | WEIGHT: 233 LBS | HEART RATE: 84 BPM

## 2024-07-02 DIAGNOSIS — E78.2 MIXED HYPERLIPIDEMIA: ICD-10-CM

## 2024-07-02 DIAGNOSIS — I77.9 BILATERAL CAROTID ARTERY DISEASE, UNSPECIFIED TYPE (HCC): ICD-10-CM

## 2024-07-02 DIAGNOSIS — I25.10 CORONARY ARTERY DISEASE INVOLVING NATIVE CORONARY ARTERY OF NATIVE HEART WITHOUT ANGINA PECTORIS: Primary | ICD-10-CM

## 2024-07-02 DIAGNOSIS — I10 PRIMARY HYPERTENSION: ICD-10-CM

## 2024-07-02 DIAGNOSIS — Z95.1 S/P CABG X 3: ICD-10-CM

## 2024-07-02 DIAGNOSIS — I21.4 NON-ST ELEVATION MYOCARDIAL INFARCTION (NSTEMI) (HCC): ICD-10-CM

## 2024-07-02 DIAGNOSIS — I47.10 PAROXYSMAL SUPRAVENTRICULAR TACHYCARDIA (HCC): ICD-10-CM

## 2024-07-02 DIAGNOSIS — Z98.62 S/P ANGIOPLASTY: ICD-10-CM

## 2024-07-02 PROCEDURE — G8427 DOCREV CUR MEDS BY ELIG CLIN: HCPCS | Performed by: INTERNAL MEDICINE

## 2024-07-02 PROCEDURE — 3075F SYST BP GE 130 - 139MM HG: CPT | Performed by: INTERNAL MEDICINE

## 2024-07-02 PROCEDURE — 3078F DIAST BP <80 MM HG: CPT | Performed by: INTERNAL MEDICINE

## 2024-07-02 PROCEDURE — G8417 CALC BMI ABV UP PARAM F/U: HCPCS | Performed by: INTERNAL MEDICINE

## 2024-07-02 PROCEDURE — 99213 OFFICE O/P EST LOW 20 MIN: CPT | Performed by: INTERNAL MEDICINE

## 2024-07-02 PROCEDURE — 1036F TOBACCO NON-USER: CPT | Performed by: INTERNAL MEDICINE

## 2024-07-02 PROCEDURE — 1123F ACP DISCUSS/DSCN MKR DOCD: CPT | Performed by: INTERNAL MEDICINE

## 2024-07-02 RX ORDER — NITROGLYCERIN 0.4 MG/1
0.4 TABLET SUBLINGUAL EVERY 5 MIN PRN
Qty: 25 TABLET | Refills: 3 | Status: SHIPPED | OUTPATIENT
Start: 2024-07-02

## 2024-07-02 NOTE — PROGRESS NOTES
OFFICE PROGRESS NOTES      Mane is a 84 y.o. male who has    CHIEF COMPLAINT AS FOLLOWS:  CHEST PAIN: Patient denies any C/O chest pains at this time.                               SOB: Has SOB with exertion but no change over previous noted.                 LEG EDEMA: No leg edema   PALPITATIONS: C/O brief episodes of palpitations, with chest pains.   DIZZINESS: No C/O Dizziness   SYNCOPE: None   OTHER/ ADDITIONAL COMPLAINTS:                                     HPI: Patient is here for F/U on his CAD,  Arrhythmia, HTN & Dyslipidemia problems.   CAD: Patient has known CAD. Had angioplasty / CABG, both in the past.  Arrhythmia: Patient has known  Supraventricular arrhythmia in the past.  HTN: Patient has known essential HTN. Has been treated with guideline recommended medical / physical/ diet therapy as stated below.  Dyslipidemia: Patient has known mixed dyslipidemia. Has been treated with guideline recommended medical / physical/ diet therapy as stated below.                Current Outpatient Medications   Medication Sig Dispense Refill    alirocumab (PRALUENT) 75 MG/ML SOAJ injection pen Inject 1 mL into the skin every 14 days 6 Adjustable Dose Pre-filled Pen Syringe 3    ticagrelor (BRILINTA) 90 MG TABS tablet Take 1 tablet by mouth 2 times daily 180 tablet 3    amLODIPine (NORVASC) 5 MG tablet Take 1 tablet by mouth daily 30 tablet 3    rOPINIRole (REQUIP) 1 MG tablet Take 2 tablets by mouth nightly      nitroGLYCERIN (NITROSTAT) 0.4 MG SL tablet Place 1 tablet under the tongue every 5 minutes as needed for Chest pain 25 tablet 1    carvedilol (COREG) 6.25 MG tablet Take 2 tablets by mouth 2 times daily (with meals) 60 tablet 5    aspirin EC 81 MG EC tablet Take 1 tablet by mouth daily 90 tablet 1    pantoprazole (PROTONIX) 40 MG tablet Take 1 tablet by mouth 2 times daily (before meals) 30 tablet 1    metFORMIN (GLUCOPHAGE) 500 MG tablet Take 1 tablet by mouth daily (with breakfast) Indications: Resume

## 2024-07-02 NOTE — PATIENT INSTRUCTIONS
CORONARY ARTERY DISEASE:Yes. Had Recurrence of symptoms, went to the hospital had SVBG  PCI. Patient is feeling better now.  Patient's IMI finding on Cardiolite is old.   clinically stable. Patient is on optimal medical regimen ( see medication list above )  -  Patient is currently  mildly symptomatic from CAD.            - changes in  treatment:   no, on ASA& Coreg. Low dose Brilinta           - Testing ordered:  no  [facility] classification: 1  Cardiolite 4/8/2021    abnormal stress test, mildy depressed LVEF, increased EDV, Myocardial    perfusion scan shows modrate size, severe intensity, non reversible    perfusion defect in inferior wall.      6/6/2023 CATH / PCI   severe LAD and diagonal and LCX occlusion, 2 grafts patent, SVG   to diagonal occlusion of 80% stenosis noted, s/p PCI with CRISTOBAL     HTN: Not well controlled on current medical regimen, see list above.              - changes in  treatment: Yes, on Cozaar & Coreg.      CARDIOMYOPATHY: None known   CONGESTIVE HEART FAILURE: NO KNOWN HISTORY.        VHD: No significant VHD noted  ECHO 6/2024   Left ventricular systolic function is low normal.   Ejection fraction is visually estimated at 50%.   Inferolateral wall segment appears hypokinetic.   Mild left ventricular hypertrophy.   Grade I diastolic dysfunction.   No significant valvular disease noted.   No evidence of any pericardial effusion.     DYSLIPIDEMIA: Patient's profile is at / near Goal.yes,                                 HDL is low                                Tolerating current medical regimen wellyes, takes Lipitor & Praluent.                                                               See most recent Lab values in Labs section above.  CAROTID ARTERY DISEASE: yes,                No symptoms described pertaining to Carotid artery disease               Up to date on non invasive testing .yes,                Patient is on Guidelines recommended therapy.yes,       ARRHYTHMIAS: H/O SVT.

## 2024-09-20 ENCOUNTER — TELEPHONE (OUTPATIENT)
Dept: CARDIOLOGY CLINIC | Age: 84
End: 2024-09-20

## 2024-09-20 NOTE — TELEPHONE ENCOUNTER
Pt wants new script for Praluent send to [facility]    90 day supply   No able to click the medication for refill

## 2024-09-23 RX ORDER — ALIROCUMAB 75 MG/ML
75 INJECTION, SOLUTION SUBCUTANEOUS
Qty: 6 ADJUSTABLE DOSE PRE-FILLED PEN SYRINGE | Refills: 3 | Status: SHIPPED | OUTPATIENT
Start: 2024-09-23

## 2025-01-21 ENCOUNTER — OFFICE VISIT (OUTPATIENT)
Dept: CARDIOLOGY CLINIC | Age: 85
End: 2025-01-21
Payer: MEDICARE

## 2025-01-21 VITALS
BODY MASS INDEX: 32.89 KG/M2 | HEART RATE: 73 BPM | SYSTOLIC BLOOD PRESSURE: 126 MMHG | WEIGHT: 242.8 LBS | HEIGHT: 72 IN | DIASTOLIC BLOOD PRESSURE: 66 MMHG

## 2025-01-21 DIAGNOSIS — I25.10 CORONARY ARTERY DISEASE INVOLVING NATIVE CORONARY ARTERY OF NATIVE HEART WITHOUT ANGINA PECTORIS: Primary | ICD-10-CM

## 2025-01-21 DIAGNOSIS — E78.2 MIXED HYPERLIPIDEMIA: ICD-10-CM

## 2025-01-21 DIAGNOSIS — I10 PRIMARY HYPERTENSION: ICD-10-CM

## 2025-01-21 DIAGNOSIS — I77.9 BILATERAL CAROTID ARTERY DISEASE, UNSPECIFIED TYPE (HCC): ICD-10-CM

## 2025-01-21 DIAGNOSIS — Z95.1 S/P CABG X 3: ICD-10-CM

## 2025-01-21 DIAGNOSIS — I65.21 STENOSIS OF RIGHT CAROTID ARTERY: ICD-10-CM

## 2025-01-21 DIAGNOSIS — I47.10 PAROXYSMAL SUPRAVENTRICULAR TACHYCARDIA (HCC): ICD-10-CM

## 2025-01-21 DIAGNOSIS — I21.4 NON-ST ELEVATION MYOCARDIAL INFARCTION (NSTEMI) (HCC): ICD-10-CM

## 2025-01-21 DIAGNOSIS — R06.02 SHORTNESS OF BREATH: ICD-10-CM

## 2025-01-21 DIAGNOSIS — Z98.62 S/P ANGIOPLASTY: ICD-10-CM

## 2025-01-21 PROCEDURE — 1159F MED LIST DOCD IN RCRD: CPT | Performed by: INTERNAL MEDICINE

## 2025-01-21 PROCEDURE — G8417 CALC BMI ABV UP PARAM F/U: HCPCS | Performed by: INTERNAL MEDICINE

## 2025-01-21 PROCEDURE — 93000 ELECTROCARDIOGRAM COMPLETE: CPT | Performed by: INTERNAL MEDICINE

## 2025-01-21 PROCEDURE — 1123F ACP DISCUSS/DSCN MKR DOCD: CPT | Performed by: INTERNAL MEDICINE

## 2025-01-21 PROCEDURE — 3078F DIAST BP <80 MM HG: CPT | Performed by: INTERNAL MEDICINE

## 2025-01-21 PROCEDURE — G8427 DOCREV CUR MEDS BY ELIG CLIN: HCPCS | Performed by: INTERNAL MEDICINE

## 2025-01-21 PROCEDURE — 99214 OFFICE O/P EST MOD 30 MIN: CPT | Performed by: INTERNAL MEDICINE

## 2025-01-21 PROCEDURE — 3074F SYST BP LT 130 MM HG: CPT | Performed by: INTERNAL MEDICINE

## 2025-01-21 PROCEDURE — 1036F TOBACCO NON-USER: CPT | Performed by: INTERNAL MEDICINE

## 2025-01-21 NOTE — PROGRESS NOTES
OFFICE PROGRESS NOTES      Mane is a 85 y.o. male who has    CHIEF COMPLAINT AS FOLLOWS:  CHEST PAIN:  Patient denies any C/O chest pains at this time.                               SOB: Has SOB with exertion says slightly worse than before..                 LEG EDEMA: No leg edema   PALPITATIONS: C/O brief episodes of palpitations, with chest pains.   DIZZINESS: No C/O Dizziness   SYNCOPE: None   OTHER/ ADDITIONAL COMPLAINTS:                                     HPI: Patient is here for F/U on his CAD, Arrhythmia, HTN & Dyslipidemia problems.   CAD: Patient has known CAD. Had angioplasty / CABG, both in the past.  Arrhythmia: Patient has known  Supraventricular arrhythmia in the past.  HTN: Patient has known essential HTN. Has been treated with guideline recommended medical / physical/ diet therapy as stated below.  Dyslipidemia: Patient has known mixed dyslipidemia. Has been treated with guideline recommended medical / physical/ diet therapy as stated below.                Current Outpatient Medications   Medication Sig Dispense Refill    alirocumab (PRALUENT) 75 MG/ML SOAJ injection pen Inject 1 mL into the skin every 14 days 6 Adjustable Dose Pre-filled Pen Syringe 3    ticagrelor (BRILINTA) 60 MG TABS tablet Take 1 tablet by mouth 2 times daily 60 tablet 5    nitroGLYCERIN (NITROSTAT) 0.4 MG SL tablet Place 1 tablet under the tongue every 5 minutes as needed for Chest pain 25 tablet 3    amLODIPine (NORVASC) 5 MG tablet Take 1 tablet by mouth daily 30 tablet 3    rOPINIRole (REQUIP) 1 MG tablet Take 2 tablets by mouth nightly      nitroGLYCERIN (NITROSTAT) 0.4 MG SL tablet Place 1 tablet under the tongue every 5 minutes as needed for Chest pain 25 tablet 1    carvedilol (COREG) 6.25 MG tablet Take 2 tablets by mouth 2 times daily (with meals) 60 tablet 5    aspirin EC 81 MG EC tablet Take 1 tablet by mouth daily 90 tablet 1    pantoprazole (PROTONIX) 40 MG tablet Take 1 tablet by mouth 2 times daily (before

## 2025-01-21 NOTE — PATIENT INSTRUCTIONS
Please be informed that if you contact our office outside of normal business hours the physician on call cannot help with any scheduling or rescheduling issues, procedure instruction questions or any type of medication issue.    We advise you for any urgent/emergency that you go to the nearest emergency room!    PLEASE CALL OUR OFFICE DURING NORMAL BUSINESS HOURS    Monday - Friday   8 am to 5 pm    [place]: [phone]    [place]: [phone]    [place]:  [phone]    **It is YOUR responsibilty to bring medication bottles and/or updated medication list to EACH APPOINTMENT. This will allow us to better serve you and all your healthcare needs**    Thank you for allowing us to care for you today!   We want to ensure we can follow your treatment plan and we strive to give you the best outcomes and experience possible.   If you ever have a life threatening emergency and call 911 - for an ambulance (EMS)   Our providers can only care for you at:   [facility] or [facility].   Even if you have someone take you or you drive yourself we can only care for you in a [facility] facility. Our providers are not setup at the other healthcare locations!   CORONARY ARTERY DISEASE:Yes. Had Recurrence of symptoms 6/2023, went to the hospital had SVBG  PCI.   Patient's IMI finding on Cardiolite is old.   clinically stable. Patient is on optimal medical regimen ( see medication list above )  -  Patient is currently  mildly symptomatic from CAD.            - changes in  treatment:   no, on ASA& Coreg. Low dose Brilinta           - Testing ordered:  no  [place]ong[place]ian classification: 1  Cardiolite 4/8/2021    abnormal stress test, mildy depressed LVEF, increased EDV, Myocardial    perfusion scan shows modrate size, severe intensity, non reversible    perfusion defect in inferior wall.      6/6/2023 CATH / PCI   severe LAD and diagonal and LCX occlusion, 2 grafts patent, SVG   to diagonal occlusion of 80%

## 2025-02-13 ENCOUNTER — TELEPHONE (OUTPATIENT)
Dept: CARDIOLOGY CLINIC | Age: 85
End: 2025-02-13

## 2025-02-13 NOTE — TELEPHONE ENCOUNTER
Notified and understood       Echo (TTE) complete     Left Ventricle: Low normal left ventricular systolic function with a visually estimated EF of 50 - 55%. Left ventricle size is normal. Mildly increased wall thickness. Normal wall motion. Grade I diastolic dysfunction with normal LAP.    Mitral Valve: Annular calcification. Calcified leaflets. Mild regurgitation. Mild stenosis noted. MV mean gradient is 3 mmHg.    Tricuspid Valve: Mild regurgitation. Mild Pulmonary Hypertension with a  RVSP of 39 mmHg.    Left Atrium: Left atrium is moderately dilated.    Pericardium: No pericardial effusion.    Image quality is good.

## 2025-07-15 NOTE — PROGRESS NOTES
Patient Name: Arthur T Midkiff  : 1940  MRN# [id number]    REASON FOR VISIT: Results of Testing  Patient Active Problem List    Diagnosis Date Noted    Paroxysmal supraventricular tachycardia 04/10/2021    Arthritis of knee, left 12/10/2020    Total knee replacement status, left 12/10/2020    History of left shoulder replacement 2020    Stenosis of right carotid artery 2016    Bilateral carotid artery disease 10/20/2016    Angina at rest     Abnormal carotid ultrasound     Unstable angina (HCC) 2015    Abnormal nuclear stress test 2015    Chest pain 2015    CAD (coronary artery disease)     SOB (shortness of breath)     Hyperlipemia     S/P CABG x 3     HTN (hypertension)     S/P angioplasty     MI (myocardial infarction) ([facility])     H/O cardiovascular stress test 2013     LABS:  Lab Results   Component Value Date    CHOL 120 2016    TRIG 86 05/15/2017    HDL 55 05/15/2017    LDLDIRECT 67 2016     Hemoglobin A1C   Date Value Ref Range Status   2020 6.5 (H) 4.2 - 6.3 % Final

## 2025-08-20 ENCOUNTER — OFFICE VISIT (OUTPATIENT)
Dept: CARDIOLOGY CLINIC | Age: 85
End: 2025-08-20
Payer: MEDICARE

## 2025-08-20 VITALS
HEART RATE: 80 BPM | HEIGHT: 72 IN | WEIGHT: 244.8 LBS | DIASTOLIC BLOOD PRESSURE: 66 MMHG | SYSTOLIC BLOOD PRESSURE: 122 MMHG | BODY MASS INDEX: 33.16 KG/M2

## 2025-08-20 DIAGNOSIS — E78.2 MIXED HYPERLIPIDEMIA: ICD-10-CM

## 2025-08-20 DIAGNOSIS — Z98.62 S/P ANGIOPLASTY: ICD-10-CM

## 2025-08-20 DIAGNOSIS — I65.21 STENOSIS OF RIGHT CAROTID ARTERY: ICD-10-CM

## 2025-08-20 DIAGNOSIS — I25.10 CORONARY ARTERY DISEASE INVOLVING NATIVE CORONARY ARTERY OF NATIVE HEART WITHOUT ANGINA PECTORIS: Primary | ICD-10-CM

## 2025-08-20 DIAGNOSIS — I47.10 PAROXYSMAL SUPRAVENTRICULAR TACHYCARDIA: ICD-10-CM

## 2025-08-20 DIAGNOSIS — Z95.1 S/P CABG X 3: ICD-10-CM

## 2025-08-20 DIAGNOSIS — I10 PRIMARY HYPERTENSION: ICD-10-CM

## 2025-08-20 PROCEDURE — 3078F DIAST BP <80 MM HG: CPT | Performed by: INTERNAL MEDICINE

## 2025-08-20 PROCEDURE — 1036F TOBACCO NON-USER: CPT | Performed by: INTERNAL MEDICINE

## 2025-08-20 PROCEDURE — G8417 CALC BMI ABV UP PARAM F/U: HCPCS | Performed by: INTERNAL MEDICINE

## 2025-08-20 PROCEDURE — 1159F MED LIST DOCD IN RCRD: CPT | Performed by: INTERNAL MEDICINE

## 2025-08-20 PROCEDURE — 3074F SYST BP LT 130 MM HG: CPT | Performed by: INTERNAL MEDICINE

## 2025-08-20 PROCEDURE — 99214 OFFICE O/P EST MOD 30 MIN: CPT | Performed by: INTERNAL MEDICINE

## 2025-08-20 PROCEDURE — G8427 DOCREV CUR MEDS BY ELIG CLIN: HCPCS | Performed by: INTERNAL MEDICINE

## 2025-08-20 PROCEDURE — 1123F ACP DISCUSS/DSCN MKR DOCD: CPT | Performed by: INTERNAL MEDICINE

## 2025-08-20 RX ORDER — ALIROCUMAB 75 MG/ML
75 INJECTION, SOLUTION SUBCUTANEOUS
Qty: 6 ADJUSTABLE DOSE PRE-FILLED PEN SYRINGE | Refills: 3 | Status: SHIPPED | OUTPATIENT
Start: 2025-08-20 | End: 2025-08-20

## 2025-08-20 RX ORDER — EVOLOCUMAB 140 MG/ML
140 INJECTION, SOLUTION SUBCUTANEOUS
Qty: 2 ML | Refills: 5 | Status: SHIPPED | OUTPATIENT
Start: 2025-08-20

## 2025-08-20 RX ORDER — ALBUTEROL SULFATE 90 UG/1
INHALANT RESPIRATORY (INHALATION)
COMMUNITY

## (undated) DEVICE — CHLORAPREP 26ML ORANGE

## (undated) DEVICE — Z DISCONTINUED USE 2744636  DRESSING AQUACEL 14 IN ALG W3.5XL14IN POLYUR FLM CVR W/ HYDRCOLL

## (undated) DEVICE — MAT FLOOR ULTRA ABS 28X48IN

## (undated) DEVICE — DUAL CUT SAGITTAL BLADE

## (undated) DEVICE — Device: Brand: ADVANCE®

## (undated) DEVICE — COVER,TABLE,44X90,STERILE: Brand: MEDLINE

## (undated) DEVICE — LINER SUCT CANSTR 1500CC SEMI RIG W/ POR HYDROPHOBIC SHUT

## (undated) DEVICE — STERILE POLYISOPRENE POWDER-FREE SURGICAL GLOVES: Brand: PROTEXIS

## (undated) DEVICE — RESTRAINT POS UNIV HD NK ALIGN DISP FOR SHLDR PROC

## (undated) DEVICE — TUBING, SUCTION, 9/32" X 10', STRAIGHT: Brand: MEDLINE

## (undated) DEVICE — TRAY EPI 25GA L3.5IN CONTAIN BPA DEHP PVC PENCAN

## (undated) DEVICE — 1010 S-DRAPE TOWEL DRAPE 10/BX: Brand: STERI-DRAPE™

## (undated) DEVICE — SUTURE FIBERWIRE 2 L97CM NONABSORBABLE BLU L36.6MM 1/2 CIR AR7206

## (undated) DEVICE — GLOVE ORANGE PI 8   MSG9080

## (undated) DEVICE — KIT EVAC 0.13IN RECT TB DIA10FR 400CC PVC 3 SPR Y CONN DRN

## (undated) DEVICE — Device

## (undated) DEVICE — STRIP,CLOSURE,WOUND,MEDI-STRIP,1/2X4: Brand: MEDLINE

## (undated) DEVICE — TOWEL,OR,DSP,ST,BLUE,STD,6/PK,12PK/CS: Brand: MEDLINE

## (undated) DEVICE — BANDAGE,ELASTIC,ESMARK,STERILE,6"X9',LF: Brand: MEDLINE

## (undated) DEVICE — BRUSH CLN DIA5MM NYL SGL END CBL ASST FLEX DSTL TIP POLYPR

## (undated) DEVICE — 3M™ IOBAN™ 2 ANTIMICROBIAL INCISE DRAPE 6650EZ: Brand: IOBAN™ 2

## (undated) DEVICE — SYRINGE IRRIG 60ML SFT PLIABLE BLB EZ TO GRP 1 HND USE W/

## (undated) DEVICE — TUBING, SUCTION, 3/16" X 6', STRAIGHT: Brand: MEDLINE

## (undated) DEVICE — MASTISOL ADHESIVE LIQ 2/3ML

## (undated) DEVICE — DRAPE SHEET ULTRAGARD: Brand: MEDLINE

## (undated) DEVICE — 3M™ STERI-DRAPE™ INCISE DRAPE 1050 (60CM X 45CM): Brand: STERI-DRAPE™

## (undated) DEVICE — YANKAUER,FLEXIBLE HANDLE,REGLR CAPACITY: Brand: MEDLINE INDUSTRIES, INC.

## (undated) DEVICE — 3M™ STERI-DRAPE™ INSTRUMENT POUCH 1018: Brand: STERI-DRAPE™

## (undated) DEVICE — 3M™ STERI-DRAPE™ U-DRAPE 1015: Brand: STERI-DRAPE™

## (undated) DEVICE — BOWL BNE CEM MIX AND SPAT DISP

## (undated) DEVICE — CONVERTORS STOCKINETTE: Brand: CONVERTORS

## (undated) DEVICE — T4 HOOD

## (undated) DEVICE — 4-PORT MANIFOLD: Brand: NEPTUNE 2

## (undated) DEVICE — 2108 SERIES SAGITTAL BLADE (19.5 X 1.27 X 90.0MM)

## (undated) DEVICE — PADDING CAST W6INXL4YD COT COHESIVE HND TEARABLE SPEC 100

## (undated) DEVICE — LINER,SEMI-RIGID,3000CC,50EA/CS: Brand: MEDLINE

## (undated) DEVICE — SOLUTION IV IRRIG WATER 1000ML POUR BRL 2F7114

## (undated) DEVICE — HEWSON SUTURE RETRIEVER: Brand: HEWSON SUTURE RETRIEVER

## (undated) DEVICE — INTENDED FOR TISSUE SEPARATION, AND OTHER PROCEDURES THAT REQUIRE A SHARP SURGICAL BLADE TO PUNCTURE OR CUT.: Brand: BARD-PARKER ® STAINLESS STEEL BLADES

## (undated) DEVICE — APPLICATOR MEDICATED 26 CC SOLUTION HI LT ORNG CHLORAPREP

## (undated) DEVICE — SUTURE ABSORBABLE BRAIDED 2-0 CT-1 27 IN UD VICRYL J259H

## (undated) DEVICE — BLADE CLIPPER GEN PURP NS

## (undated) DEVICE — JELLY LUBRICATING 3 GM BACTERIOSTATIC

## (undated) DEVICE — NEEDLE SPNL 18GA L6IN PNK LNG LEN DISP

## (undated) DEVICE — ELECTRODE ES AD CRDLSS PT RET REM POLYHESIVE

## (undated) DEVICE — SUTURE VCRL SZ 0 L27IN ABSRB UD L36MM CT-1 1/2 CIR J260H

## (undated) DEVICE — SURGICAL PROCEDURE PACK TOT KNEE LF

## (undated) DEVICE — TUBING, SUCTION, 3/16" X 10', STRAIGHT: Brand: MEDLINE

## (undated) DEVICE — GOWN,ECLIPSE,POLYRNF,BRTHSLV,L,30/CS: Brand: MEDLINE

## (undated) DEVICE — DRESSING TRNSPAR W5XL4.5IN FLM SHT SEMIPERMEABLE WIND

## (undated) DEVICE — HOOD, PEEL-AWAY: Brand: FLYTE

## (undated) DEVICE — PENCIL ES CRD L10FT HND SWCHING ROCK SWCH W/ EDGE COAT BLDE

## (undated) DEVICE — IMMOBILIZER SHLDR SWTH UNIV DEV BLK P.A.D. III

## (undated) DEVICE — GLOVE ORANGE PI 7   MSG9070

## (undated) DEVICE — CATHETER FOL 2 W 18 FR 5 CC URETH COUNCL TIP SIL LUBRI-SIL

## (undated) DEVICE — DUAL SPIKE Y-CONNECTOR SET, PACKAGED: Brand: PULSAVAC®

## (undated) DEVICE — MARKER SURG SKIN UTIL REGULAR/FINE 2 TIP W/ RUL AND 9 LBL

## (undated) DEVICE — SYSTEM SKIN CLSR 22CM DERMBND PRINEO

## (undated) DEVICE — GLOVE SURG SZ 8 CRM LTX FREE POLYISOPRENE POLYMER BEAD ANTI

## (undated) DEVICE — SYRINGE MED 30ML STD CLR PLAS LUERLOCK TIP N CTRL DISP

## (undated) DEVICE — SOLUTION IV IRRIG POUR BRL 0.9% SODIUM CHL 2F7124

## (undated) DEVICE — FAN SPRAY KIT: Brand: PULSAVAC®

## (undated) DEVICE — TAPE,CLOTH/SILK,CURAD,3"X10YD,LF,40/CS: Brand: CURAD

## (undated) DEVICE — STRYKER PERFORMANCE SERIES SAGITTAL BLADE: Brand: STRYKER PERFORMANCE SERIES

## (undated) DEVICE — BLADE SAW W12.5XL70MM THK0.8MM CUT THK1.12MM S STL RECIP

## (undated) DEVICE — DRAPE,U/ SHT,SPLIT,PLAS,STERIL: Brand: MEDLINE

## (undated) DEVICE — SOLUTION IV 1000ML 0.9% SOD CHL FOR IRRIG PLAS CONT

## (undated) DEVICE — Z DISCONTINUED (USE MFG CAT MVABO)  TUBING GAS SAMPLING STD 6.5 FT FEMALE CONN SMRT CAPNOLINE

## (undated) DEVICE — BIT DRL L110MM DIA2MM QUIK CONN W/O STP N RADLUC REUSE

## (undated) DEVICE — GUIDEPIN ORTH L300MM DIA1.5MM GLENOSPHERE FOR DELT XTEND

## (undated) DEVICE — GLOVE SURG SZ 65 CRM LTX FREE POLYISOPRENE POLYMER BEAD ANTI

## (undated) DEVICE — 3M™ MICROFOAM™ SURGICAL TAPE 4 ROLLS/CARTON 6 CARTONS/CASE 1528-3: Brand: 3M™ MICROFOAM™

## (undated) DEVICE — PAD CLD R HIP REG HOSE NONSTERILE

## (undated) DEVICE — Z INACTIVE USE 2660664 SOLUTION IRRIG 3000ML 0.9% SOD CHL USP UROMATIC PLAS CONT

## (undated) DEVICE — GLOVE SURG SZ 7 CRM LTX FREE POLYISOPRENE POLYMER BEAD ANTI

## (undated) DEVICE — STRIP SKIN CLSR W0.25XL4IN WHT SPUNBOUND FBR NYL HI ADH

## (undated) DEVICE — CLASSIC CLD THER SYS

## (undated) DEVICE — GOWN,ECLIPSE,POLYRNF,BRTHSLV,XL,30/CS: Brand: MEDLINE

## (undated) DEVICE — HERCULES 3 STAGE BALLOON ESOPHAGEAL: Brand: HERCULES

## (undated) DEVICE — ZIMMER® STERILE DISPOSABLE TOURNIQUET CUFF WITH PLC, DUAL PORT, SINGLE BLADDER, 24 IN. (61 CM)

## (undated) DEVICE — PROTECTOR EYE PT SELF ADH NS OPT GRD LF

## (undated) DEVICE — ALCOHOL RUBBING ISO 16OZ 70%

## (undated) DEVICE — BANDAGE,SELF ADHRNT,COFLEX,4"X5YD,STRL: Brand: COLABEL

## (undated) DEVICE — GOWN,SIRUS,POLYRNF,BRTHSLV,XLN/XL,20/CS: Brand: MEDLINE

## (undated) DEVICE — COVER,MAYO STAND,STERILE: Brand: MEDLINE

## (undated) DEVICE — 3M™ STERI-STRIP™ COMPOUND BENZOIN TINCTURE 40 BAGS/CARTON 4 CARTONS/CASE C1544: Brand: 3M™ STERI-STRIP™

## (undated) DEVICE — COUNTER NDL 30 COUNT FOAM STRP SGL MAG

## (undated) DEVICE — GUIDEPIN ORTH L190MM DIA2.5MM METAGLENE CTRL FOR DELT XTEND

## (undated) DEVICE — DILATION SYRINGE: Brand: COOK

## (undated) DEVICE — SPONGE LAP W18XL18IN WHT COT 4 PLY FLD STRUNG RADPQ DISP ST

## (undated) DEVICE — DRESSING,GAUZE,XEROFORM,CURAD,1"X8",ST: Brand: CURAD